# Patient Record
Sex: MALE | Race: WHITE | Employment: OTHER | URBAN - METROPOLITAN AREA
[De-identification: names, ages, dates, MRNs, and addresses within clinical notes are randomized per-mention and may not be internally consistent; named-entity substitution may affect disease eponyms.]

---

## 2017-01-04 ENCOUNTER — TELEPHONE (OUTPATIENT)
Dept: DERMATOLOGY | Age: 67
End: 2017-01-04

## 2017-02-01 ENCOUNTER — OFFICE VISIT (OUTPATIENT)
Dept: DERMATOLOGY | Age: 67
End: 2017-02-01

## 2017-02-01 DIAGNOSIS — L66.1 LICHEN PLANOPILARIS: Primary | ICD-10-CM

## 2017-02-01 PROCEDURE — 99212 OFFICE O/P EST SF 10 MIN: CPT | Performed by: DERMATOLOGY

## 2017-02-01 RX ORDER — FLUOCINONIDE TOPICAL SOLUTION USP, 0.05% 0.5 MG/ML
SOLUTION TOPICAL
Qty: 60 ML | Refills: 1 | Status: SHIPPED | OUTPATIENT
Start: 2017-02-01 | End: 2017-08-07 | Stop reason: SDUPTHER

## 2017-03-08 ENCOUNTER — EMPLOYEE WELLNESS (OUTPATIENT)
Dept: OTHER | Age: 67
End: 2017-03-08

## 2017-07-03 RX ORDER — TADALAFIL 5 MG
TABLET ORAL
Qty: 8 TABLET | Refills: 2 | Status: SHIPPED | OUTPATIENT
Start: 2017-07-03 | End: 2018-10-10 | Stop reason: SDUPTHER

## 2017-08-07 ENCOUNTER — OFFICE VISIT (OUTPATIENT)
Dept: DERMATOLOGY | Age: 67
End: 2017-08-07

## 2017-08-07 DIAGNOSIS — L66.1 LICHEN PLANOPILARIS: Primary | ICD-10-CM

## 2017-08-07 PROCEDURE — 99213 OFFICE O/P EST LOW 20 MIN: CPT | Performed by: DERMATOLOGY

## 2017-08-07 RX ORDER — FLUOCINONIDE TOPICAL SOLUTION USP, 0.05% 0.5 MG/ML
SOLUTION TOPICAL
Qty: 60 ML | Refills: 1 | Status: SHIPPED | OUTPATIENT
Start: 2017-08-07 | End: 2018-03-07

## 2018-02-07 ENCOUNTER — OFFICE VISIT (OUTPATIENT)
Dept: DERMATOLOGY | Age: 68
End: 2018-02-07

## 2018-02-07 DIAGNOSIS — L82.1 SK (SEBORRHEIC KERATOSIS): ICD-10-CM

## 2018-02-07 DIAGNOSIS — L66.1 LICHEN PLANOPILARIS: Primary | ICD-10-CM

## 2018-02-07 PROCEDURE — 99213 OFFICE O/P EST LOW 20 MIN: CPT | Performed by: DERMATOLOGY

## 2018-02-07 RX ORDER — BETAMETHASONE DIPROPIONATE 0.5 MG/ML
LOTION, AUGMENTED TOPICAL
Qty: 60 ML | Refills: 3 | Status: SHIPPED | OUTPATIENT
Start: 2018-02-07 | End: 2018-11-28 | Stop reason: ALTCHOICE

## 2018-03-07 ENCOUNTER — OFFICE VISIT (OUTPATIENT)
Dept: FAMILY MEDICINE CLINIC | Age: 68
End: 2018-03-07

## 2018-03-07 VITALS
HEIGHT: 68 IN | BODY MASS INDEX: 26.98 KG/M2 | SYSTOLIC BLOOD PRESSURE: 106 MMHG | WEIGHT: 178 LBS | RESPIRATION RATE: 14 BRPM | HEART RATE: 50 BPM | DIASTOLIC BLOOD PRESSURE: 70 MMHG | OXYGEN SATURATION: 96 %

## 2018-03-07 DIAGNOSIS — Z11.59 NEED FOR HEPATITIS C SCREENING TEST: ICD-10-CM

## 2018-03-07 DIAGNOSIS — I25.110 CORONARY ARTERY DISEASE INVOLVING NATIVE CORONARY ARTERY OF NATIVE HEART WITH UNSTABLE ANGINA PECTORIS (HCC): Primary | ICD-10-CM

## 2018-03-07 DIAGNOSIS — Z12.5 PROSTATE CANCER SCREENING: ICD-10-CM

## 2018-03-07 PROCEDURE — 99214 OFFICE O/P EST MOD 30 MIN: CPT | Performed by: FAMILY MEDICINE

## 2018-03-07 ASSESSMENT — PATIENT HEALTH QUESTIONNAIRE - PHQ9
1. LITTLE INTEREST OR PLEASURE IN DOING THINGS: 0
SUM OF ALL RESPONSES TO PHQ QUESTIONS 1-9: 0
SUM OF ALL RESPONSES TO PHQ9 QUESTIONS 1 & 2: 0
2. FEELING DOWN, DEPRESSED OR HOPELESS: 0

## 2018-03-07 ASSESSMENT — ENCOUNTER SYMPTOMS: SHORTNESS OF BREATH: 0

## 2018-03-07 NOTE — PROGRESS NOTES
No current facility-administered medications for this visit. Facility-Administered Medications Ordered in Other Visits   Medication Dose Route Frequency Provider Last Rate Last Dose    lidocaine 1 % (PF) injection 0.1-0.5 mL  0.1-0.5 mL Intradermal Once Fady Schaeffer MD           There is no immunization history for the selected administration types on file for this patient. No Known Allergies    Orders Only on 03/08/2017   Component Date Value Ref Range Status    Glucose 03/08/2017 97  70 - 99 mg/dL Final    Cholesterol, Total 03/08/2017 155  0 - 199 mg/dL Final    Triglycerides 03/08/2017 94  0 - 150 mg/dL Final    HDL 03/08/2017 42  40 - 60 mg/dL Final    LDL Calculated 03/08/2017 94  <100 mg/dL Final       Review of Systems   Constitutional: Negative for fatigue. Respiratory: Negative for shortness of breath. Cardiovascular: Negative for chest pain and palpitations. /70 (Site: Left Arm, Position: Sitting, Cuff Size: Medium Adult)   Pulse 50   Resp 14   Ht 5' 8\" (1.727 m)   Wt 178 lb (80.7 kg)   SpO2 96%   BMI 27.06 kg/m²     Physical Exam   Constitutional: He is oriented to person, place, and time. He appears well-developed and well-nourished. HENT:   Head: Normocephalic and atraumatic. Eyes: EOM are normal. Pupils are equal, round, and reactive to light. Neck: Normal range of motion. Cardiovascular: Normal rate, regular rhythm and normal heart sounds. Pulmonary/Chest: Effort normal and breath sounds normal.   Abdominal: Bowel sounds are normal. There is no tenderness. Neurological: He is alert and oriented to person, place, and time. Psychiatric: He has a normal mood and affect. His behavior is normal. Judgment and thought content normal.       Plan  1.  Coronary artery disease involving native coronary artery of native heart with unstable angina pectoris (HCC)  TSH without Reflex    T4, Free    T3, Free    Lipid Panel    CBC Auto Differential Comprehensive Metabolic Panel    Vitamin D 25 Hydroxy    Magnesium   2. Prostate cancer screening  Psa screening   3. Need for hepatitis C screening test  Hepatitis Panel, Acute       Return in about 1 month (around 4/7/2018) for Physical Exam.    Prior to Visit Medications    Medication Sig Taking? Authorizing Provider   betamethasone, augmented, (DIPROLENE) 0.05 % lotion Apply to the the sides and back of the scalp daily for 2 weeks and then 2 times weekly thereafter. Yes Lizzette Becerra MD   CIALIS 5 MG tablet TAKE 1 TABLET BY MOUTH AS NEEDED FOR ERECTILE DYSFUNCTION Yes Shakila Iqbal MD   carvedilol (COREG) 3.125 MG tablet Take 1 tablet by mouth 2 times daily Yes Venkata Stroud MD   aspirin 325 MG tablet Take 325 mg by mouth daily as needed.  Yes Historical Provider, MD   atorvastatin (LIPITOR) 40 MG tablet Take 1 tablet by mouth nightly  Alexys Casas MD

## 2018-03-08 ENCOUNTER — EMPLOYEE WELLNESS (OUTPATIENT)
Dept: OTHER | Age: 68
End: 2018-03-08

## 2018-03-08 LAB
CHOLESTEROL, TOTAL: 188 MG/DL (ref 0–199)
GLUCOSE BLD-MCNC: 89 MG/DL (ref 70–99)
HDLC SERPL-MCNC: 48 MG/DL (ref 40–60)
LDL CHOLESTEROL CALCULATED: 124 MG/DL
TRIGL SERPL-MCNC: 78 MG/DL (ref 0–150)

## 2018-03-19 ENCOUNTER — TELEPHONE (OUTPATIENT)
Dept: FAMILY MEDICINE CLINIC | Age: 68
End: 2018-03-19

## 2018-03-20 VITALS — BODY MASS INDEX: 27.52 KG/M2 | WEIGHT: 181 LBS

## 2018-04-02 VITALS — WEIGHT: 179 LBS | BODY MASS INDEX: 27.22 KG/M2

## 2018-04-09 DIAGNOSIS — I25.110 CORONARY ARTERY DISEASE INVOLVING NATIVE CORONARY ARTERY OF NATIVE HEART WITH UNSTABLE ANGINA PECTORIS (HCC): ICD-10-CM

## 2018-04-09 DIAGNOSIS — Z11.59 NEED FOR HEPATITIS C SCREENING TEST: ICD-10-CM

## 2018-04-09 DIAGNOSIS — Z12.5 PROSTATE CANCER SCREENING: ICD-10-CM

## 2018-04-09 LAB
A/G RATIO: 1.7 (ref 1.1–2.2)
ALBUMIN SERPL-MCNC: 4.2 G/DL (ref 3.4–5)
ALP BLD-CCNC: 62 U/L (ref 40–129)
ALT SERPL-CCNC: 17 U/L (ref 10–40)
ANION GAP SERPL CALCULATED.3IONS-SCNC: 14 MMOL/L (ref 3–16)
AST SERPL-CCNC: 18 U/L (ref 15–37)
BASOPHILS ABSOLUTE: 0.1 K/UL (ref 0–0.2)
BASOPHILS RELATIVE PERCENT: 1.2 %
BILIRUB SERPL-MCNC: 0.6 MG/DL (ref 0–1)
BUN BLDV-MCNC: 15 MG/DL (ref 7–20)
CALCIUM SERPL-MCNC: 9 MG/DL (ref 8.3–10.6)
CHLORIDE BLD-SCNC: 104 MMOL/L (ref 99–110)
CO2: 25 MMOL/L (ref 21–32)
CREAT SERPL-MCNC: 0.8 MG/DL (ref 0.8–1.3)
EOSINOPHILS ABSOLUTE: 0.1 K/UL (ref 0–0.6)
EOSINOPHILS RELATIVE PERCENT: 1.6 %
GFR AFRICAN AMERICAN: >60
GFR NON-AFRICAN AMERICAN: >60
GLOBULIN: 2.5 G/DL
GLUCOSE BLD-MCNC: 89 MG/DL (ref 70–99)
HAV IGM SER IA-ACNC: NORMAL
HCT VFR BLD CALC: 43.9 % (ref 40.5–52.5)
HEMOGLOBIN: 14.6 G/DL (ref 13.5–17.5)
HEPATITIS B CORE IGM ANTIBODY: NORMAL
HEPATITIS B SURFACE ANTIGEN INTERPRETATION: NORMAL
HEPATITIS C ANTIBODY INTERPRETATION: NORMAL
LYMPHOCYTES ABSOLUTE: 1.3 K/UL (ref 1–5.1)
LYMPHOCYTES RELATIVE PERCENT: 16.9 %
MAGNESIUM: 2 MG/DL (ref 1.8–2.4)
MCH RBC QN AUTO: 29.2 PG (ref 26–34)
MCHC RBC AUTO-ENTMCNC: 33.3 G/DL (ref 31–36)
MCV RBC AUTO: 87.7 FL (ref 80–100)
MONOCYTES ABSOLUTE: 0.7 K/UL (ref 0–1.3)
MONOCYTES RELATIVE PERCENT: 8.9 %
NEUTROPHILS ABSOLUTE: 5.6 K/UL (ref 1.7–7.7)
NEUTROPHILS RELATIVE PERCENT: 71.4 %
PDW BLD-RTO: 13.8 % (ref 12.4–15.4)
PLATELET # BLD: 211 K/UL (ref 135–450)
PMV BLD AUTO: 9.5 FL (ref 5–10.5)
POTASSIUM SERPL-SCNC: 4.4 MMOL/L (ref 3.5–5.1)
PROSTATE SPECIFIC ANTIGEN: 5.18 NG/ML (ref 0–4)
RBC # BLD: 5.01 M/UL (ref 4.2–5.9)
SODIUM BLD-SCNC: 143 MMOL/L (ref 136–145)
T3 FREE: 3.2 PG/ML (ref 2.3–4.2)
T4 FREE: 1.1 NG/DL (ref 0.9–1.8)
TOTAL PROTEIN: 6.7 G/DL (ref 6.4–8.2)
TSH SERPL DL<=0.05 MIU/L-ACNC: 1.58 UIU/ML (ref 0.27–4.2)
VITAMIN D 25-HYDROXY: 31.6 NG/ML
WBC # BLD: 7.8 K/UL (ref 4–11)

## 2018-04-11 ENCOUNTER — OFFICE VISIT (OUTPATIENT)
Dept: FAMILY MEDICINE CLINIC | Age: 68
End: 2018-04-11

## 2018-04-11 VITALS
HEART RATE: 65 BPM | DIASTOLIC BLOOD PRESSURE: 70 MMHG | HEIGHT: 68 IN | BODY MASS INDEX: 26.67 KG/M2 | SYSTOLIC BLOOD PRESSURE: 126 MMHG | OXYGEN SATURATION: 94 % | WEIGHT: 176 LBS | RESPIRATION RATE: 14 BRPM

## 2018-04-11 DIAGNOSIS — Z00.00 ANNUAL PHYSICAL EXAM: Primary | ICD-10-CM

## 2018-04-11 DIAGNOSIS — R97.20 ELEVATED PSA: ICD-10-CM

## 2018-04-11 PROCEDURE — 99397 PER PM REEVAL EST PAT 65+ YR: CPT | Performed by: FAMILY MEDICINE

## 2018-04-11 ASSESSMENT — ENCOUNTER SYMPTOMS
RHINORRHEA: 1
EYE PAIN: 0
COUGH: 0
SHORTNESS OF BREATH: 0
ABDOMINAL PAIN: 0
DIARRHEA: 0
BACK PAIN: 0
VOMITING: 0
EYE DISCHARGE: 0
APNEA: 0
SORE THROAT: 0
EYE ITCHING: 0
CONSTIPATION: 0
COLOR CHANGE: 0

## 2018-05-16 ENCOUNTER — OFFICE VISIT (OUTPATIENT)
Dept: DERMATOLOGY | Age: 68
End: 2018-05-16

## 2018-05-16 DIAGNOSIS — L57.0 AK (ACTINIC KERATOSIS): ICD-10-CM

## 2018-05-16 DIAGNOSIS — L66.1 LICHEN PLANOPILARIS: Primary | ICD-10-CM

## 2018-05-16 DIAGNOSIS — L82.1 SK (SEBORRHEIC KERATOSIS): ICD-10-CM

## 2018-05-16 PROCEDURE — 99213 OFFICE O/P EST LOW 20 MIN: CPT | Performed by: DERMATOLOGY

## 2018-05-16 PROCEDURE — 17003 DESTRUCT PREMALG LES 2-14: CPT | Performed by: DERMATOLOGY

## 2018-05-16 PROCEDURE — 17000 DESTRUCT PREMALG LESION: CPT | Performed by: DERMATOLOGY

## 2018-07-02 ENCOUNTER — OFFICE VISIT (OUTPATIENT)
Dept: FAMILY MEDICINE CLINIC | Age: 68
End: 2018-07-02

## 2018-07-02 VITALS
HEART RATE: 58 BPM | SYSTOLIC BLOOD PRESSURE: 114 MMHG | BODY MASS INDEX: 27.67 KG/M2 | DIASTOLIC BLOOD PRESSURE: 69 MMHG | TEMPERATURE: 98.6 F | WEIGHT: 182 LBS | RESPIRATION RATE: 16 BRPM

## 2018-07-02 DIAGNOSIS — H61.22 LEFT EAR IMPACTED CERUMEN: Primary | ICD-10-CM

## 2018-07-02 PROCEDURE — 69209 REMOVE IMPACTED EAR WAX UNI: CPT | Performed by: NURSE PRACTITIONER

## 2018-07-02 PROCEDURE — 99214 OFFICE O/P EST MOD 30 MIN: CPT | Performed by: NURSE PRACTITIONER

## 2018-07-02 ASSESSMENT — ENCOUNTER SYMPTOMS
STRIDOR: 0
BLURRED VISION: 0
SINUS PAIN: 0
RHINORRHEA: 1
VOMITING: 0
RESPIRATORY NEGATIVE: 1
DOUBLE VISION: 0
SORE THROAT: 0

## 2018-07-02 NOTE — PROGRESS NOTES
Subjective:      Chief Complaint   Patient presents with   Yogesh Bills - trouble hearing x 1 yr - on & off - tried to clean it out       Patient ID: Cheng May is a 74 y.o. male who presents for Earwax removal. He is a patient Dr. Deb Pinon. And has been having trouble hearing out of his left ear on and off for over a year. Recently it has become worse. States his wife is a nurse and she and patient have been trying to clean it with various solutions without benefit. Otalgia    There is pain in the left ear. This is a chronic problem. The current episode started more than 1 month ago. The problem occurs constantly. The problem has been gradually worsening. There has been no fever. The pain is at a severity of 5/10. The pain is moderate. Associated symptoms include headaches, hearing loss and rhinorrhea. Pertinent negatives include no ear discharge, neck pain, sore throat or vomiting. Treatments tried: over-the-counter ear wax removal. The treatment provided mild relief. Family History   Problem Relation Age of Onset    Heart Disease Father     Stroke Mother        Social History     Social History    Marital status:      Spouse name: N/A    Number of children: N/A    Years of education: N/A     Occupational History    Not on file. Social History Main Topics    Smoking status: Never Smoker    Smokeless tobacco: Never Used    Alcohol use 0.6 oz/week     1 Cans of beer per week      Comment: occassionally    Drug use: No    Sexual activity: Not Currently     Other Topics Concern    Not on file     Social History Narrative    No narrative on file       Current Outpatient Prescriptions on File Prior to Visit   Medication Sig Dispense Refill    betamethasone, augmented, (DIPROLENE) 0.05 % lotion Apply to the the sides and back of the scalp daily for 2 weeks and then 2 times weekly thereafter.  60 mL 3    CIALIS 5 MG tablet TAKE 1 TABLET BY MOUTH AS NEEDED FOR ERECTILE DYSFUNCTION 8

## 2018-10-10 NOTE — TELEPHONE ENCOUNTER
VSP - you have not seen patient since 12/30/16 (see note below). He was supposed to follow up in 1 year. Do you want to refill this medication? I have pended it if you decide to do so. Assessment:  61 y.o. patient with:  1. Coronary artery disease              ~S/P CABG x 5 Dec 2015              ~normal LVEF after bypass              ~Discusssed stopping Coreg 3 years post CABG   2. Hypotension              BP: (120)/(60)               ~unable to tolerate ACE inhinbitor  3.  Erectile dysfunction

## 2018-10-11 RX ORDER — TADALAFIL 5 MG/1
TABLET ORAL
Qty: 8 TABLET | Refills: 0 | Status: SHIPPED | OUTPATIENT
Start: 2018-10-11 | End: 2018-10-12 | Stop reason: SDUPTHER

## 2018-10-12 ENCOUNTER — TELEPHONE (OUTPATIENT)
Dept: CARDIOLOGY CLINIC | Age: 68
End: 2018-10-12

## 2018-10-12 DIAGNOSIS — N52.9 ERECTILE DYSFUNCTION, UNSPECIFIED ERECTILE DYSFUNCTION TYPE: Primary | ICD-10-CM

## 2018-10-12 RX ORDER — TADALAFIL 5 MG/1
TABLET ORAL
Qty: 8 TABLET | Refills: 0 | Status: ON HOLD | OUTPATIENT
Start: 2018-10-12 | End: 2019-03-30 | Stop reason: ALTCHOICE

## 2018-10-12 RX ORDER — TADALAFIL 5 MG/1
TABLET ORAL
Qty: 8 TABLET | Refills: 0 | Status: SHIPPED | OUTPATIENT
Start: 2018-10-12 | End: 2018-10-12 | Stop reason: SDUPTHER

## 2018-10-12 NOTE — TELEPHONE ENCOUNTER
Pt would like the Cialis Rx to go to [facility] outpatient pharmacy instead of [place]. Please resend.

## 2018-10-22 ENCOUNTER — HOSPITAL ENCOUNTER (OUTPATIENT)
Age: 68
Discharge: HOME | End: 2018-10-22
Payer: COMMERCIAL

## 2018-10-22 PROCEDURE — 84153 ASSAY OF PSA TOTAL: CPT

## 2018-10-22 PROCEDURE — 36415 COLL VENOUS BLD VENIPUNCTURE: CPT

## 2018-10-23 LAB — PROSTATE SPECIFIC ANTIGEN: 5.95 NG/ML (ref 0–4)

## 2018-11-04 ENCOUNTER — TELEPHONE (OUTPATIENT)
Dept: CARDIOLOGY CLINIC | Age: 68
End: 2018-11-04

## 2018-11-26 ENCOUNTER — OFFICE VISIT (OUTPATIENT)
Dept: CARDIOLOGY CLINIC | Age: 68
End: 2018-11-26
Payer: COMMERCIAL

## 2018-11-26 VITALS
OXYGEN SATURATION: 99 % | WEIGHT: 190.8 LBS | SYSTOLIC BLOOD PRESSURE: 120 MMHG | BODY MASS INDEX: 28.92 KG/M2 | HEART RATE: 45 BPM | HEIGHT: 68 IN | DIASTOLIC BLOOD PRESSURE: 82 MMHG

## 2018-11-26 DIAGNOSIS — I25.10 CORONARY ARTERY DISEASE INVOLVING NATIVE CORONARY ARTERY OF NATIVE HEART WITHOUT ANGINA PECTORIS: Primary | ICD-10-CM

## 2018-11-26 DIAGNOSIS — E78.2 MIXED HYPERLIPIDEMIA: ICD-10-CM

## 2018-11-26 DIAGNOSIS — I25.110 CORONARY ARTERY DISEASE INVOLVING NATIVE CORONARY ARTERY OF NATIVE HEART WITH UNSTABLE ANGINA PECTORIS (HCC): ICD-10-CM

## 2018-11-26 PROCEDURE — 99214 OFFICE O/P EST MOD 30 MIN: CPT | Performed by: INTERNAL MEDICINE

## 2018-11-26 RX ORDER — ATORVASTATIN CALCIUM 10 MG/1
10 TABLET, FILM COATED ORAL NIGHTLY
Qty: 90 TABLET | Refills: 3 | Status: SHIPPED | OUTPATIENT
Start: 2018-11-26 | End: 2019-02-12 | Stop reason: SDUPTHER

## 2018-11-26 NOTE — LETTER
[address] [place] [address] Cardiology - [address] [place] [address] Rebecca Villegas [place] [address]  Phone: [phone]  Fax: [phone]    Drew Cedillo MD        December 2, 2018     Maynor Solis DO  [address] [address]    Patient: Sharifa Oviedo  MR Number: [id number]  YOB: 1950  Date of Visit: 11/26/2018    Dear Dr. Maynor Solis: Thank you for allowing me to participate in the care of Sharifa Oviedo. Below are the relevant portions of my assessment and plan of care. History of present illness on initial date of evaluation:              Sharifa Oviedo is a 79 y.o. patient who presents who presents for follow-up. He reports he has been feeling well since his last visit. He decreased his Coreg to once daily roughly 1 year ago. He stopped his Lipitor due to the possible side effects. He has not experienced any of these side effects, though is fearful of them. He has been taking CoQ10. He reports that he recently found that his PSA is elevated. He has been following up appropriately for this. Claudia Sanchez remains active, exercising regularly. He has been attempting to lose weight by monitoring his diet. Assessment:  79 y.o. patient with:  1. Coronary artery disease              ~S/P CABG x 5 Dec 2015              ~normal LVEF after bypass              ~Discusssed stopping Coreg 3 years post CABG   2. Hypotension              BP: (120)/(82)               ~unable to tolerate ACE inhibitor  3. Hyperlipidemia              ~Stopped Lipitor due to fear of side effects. 4. Erectile dysfunction     The 10-year ASCVD risk score (Jim Calixto, et al., 2013) is: 12.1%    Values used to calculate the score:      Age: 79 years      Sex: Male      Is Non- : No      Diabetic: No      Tobacco smoker: No      Systolic Blood Pressure: 302 mmHg      Is BP treated: No      HDL Cholesterol: 48 mg/dL      Total Cholesterol: 188 mg/dL      Plan:  1. Decrease Aspirin 81 mg daily.

## 2018-11-28 ENCOUNTER — OFFICE VISIT (OUTPATIENT)
Dept: FAMILY MEDICINE CLINIC | Age: 68
End: 2018-11-28
Payer: COMMERCIAL

## 2018-11-28 VITALS
OXYGEN SATURATION: 99 % | RESPIRATION RATE: 16 BRPM | SYSTOLIC BLOOD PRESSURE: 118 MMHG | DIASTOLIC BLOOD PRESSURE: 70 MMHG | WEIGHT: 187 LBS | BODY MASS INDEX: 28.43 KG/M2 | TEMPERATURE: 98 F | HEART RATE: 80 BPM

## 2018-11-28 DIAGNOSIS — M54.31 SCIATICA, RIGHT SIDE: ICD-10-CM

## 2018-11-28 DIAGNOSIS — Z01.818 PRE-OP EXAM: Primary | ICD-10-CM

## 2018-11-28 PROCEDURE — 93000 ELECTROCARDIOGRAM COMPLETE: CPT | Performed by: FAMILY MEDICINE

## 2018-11-28 PROCEDURE — 99244 OFF/OP CNSLTJ NEW/EST MOD 40: CPT | Performed by: FAMILY MEDICINE

## 2018-11-28 RX ORDER — TIZANIDINE 4 MG/1
4 TABLET ORAL EVERY 8 HOURS PRN
Qty: 30 TABLET | Refills: 0 | Status: SHIPPED | OUTPATIENT
Start: 2018-11-28 | End: 2018-12-08

## 2018-11-28 ASSESSMENT — PATIENT HEALTH QUESTIONNAIRE - PHQ9
2. FEELING DOWN, DEPRESSED OR HOPELESS: 0
SUM OF ALL RESPONSES TO PHQ QUESTIONS 1-9: 0
SUM OF ALL RESPONSES TO PHQ QUESTIONS 1-9: 0
1. LITTLE INTEREST OR PLEASURE IN DOING THINGS: 0
SUM OF ALL RESPONSES TO PHQ9 QUESTIONS 1 & 2: 0

## 2018-11-28 NOTE — PROGRESS NOTES
Preoperative Consultation      Michelle Rosales  YOB: 1950    Date of Service:  11/28/2018    Vitals:    11/28/18 0936   BP: 118/70   Site: Right Upper Arm   Position: Sitting   Pulse: 80   Resp: 16   Temp: 98 °F (36.7 °C)   TempSrc: Oral   SpO2: 99%   Weight: 187 lb (84.8 kg)      Wt Readings from Last 2 Encounters:   11/28/18 187 lb (84.8 kg)   11/26/18 190 lb 12.8 oz (86.5 kg)     BP Readings from Last 3 Encounters:   11/28/18 118/70   11/26/18 120/82   07/02/18 114/69        Chief Complaint   Patient presents with    Pre-op Exam     biopsy of prostate, 12/5/18, Dr. Joseline Valle[facility]     No Known Allergies  Outpatient Prescriptions Marked as Taking for the 11/28/18 encounter (Office Visit) with Sky Ulloa, DO   Medication Sig Dispense Refill    aspirin 81 MG tablet Take 1 tablet by mouth daily 90 tablet 3    atorvastatin (LIPITOR) 10 MG tablet Take 1 tablet by mouth nightly 90 tablet 3    tadalafil (CIALIS) 5 MG tablet TAKE 1 TABLET BY MOUTH AS NEEDED FOR ERECTILE DYSFUNCTION 8 tablet 0       This patient presents to the office today for a preoperative consultation at the request of surgeon, Dr. Joseline Valle, who plans on performing [facility]. The current problem began 7 years ago, and symptoms have been worsening with time. Conservative therapy: Flomax but didn't like SE's. Pt has also been having sciatica in right left since yesterday, has periodic episodes which resolve in a few days for the last 40-50 years.     Planned anesthesia: General   Known anesthesia problems: None   Bleeding risk: No recent or remote history of abnormal bleeding  Personal or FH of DVT/PE: No    Patient objection to receiving blood products: No    Patient Active Problem List   Diagnosis    Chest pain on exertion    Exertional chest pain    ACS (acute coronary syndrome) (HCC)    Abnormal stress test    Coronary artery disease involving native coronary artery of

## 2019-01-10 NOTE — PROGRESS NOTES
Name_______________________________________Printed:____________________  Date and time of surgery__1/28/19  0730______________________Arrival Time:_0600  main_______________   1. Do not eat or drink anything after 12 midnight (or____hours) prior to surgery. This includes no water, chewing gum or mints. Endoscopy patients follow your doctors bowel prep instructions,which may include taking part of prep after midnight. 2. Take the following pills with a small sip of water on the morning of surgery___none__________________________________________________   3. Aspirin, Ibuprofen, Advil, Naproxen, Vitamin E and other Anti-inflammatory products should be stopped for 5 days before surgery or as directed by your physician. 4. Check with your Doctor regarding stopping Plavix, Coumadin,Eliquis, Lovenox,Effient,Pradaxa,Xarelto, Fragmin or other blood thinners and follow their instructions. 5. Do not smoke, and do not drink any alcoholic beverages 24 hours prior to surgery. This includes NA Beer. Refrain from the usage of any recreational drugs. 6. You may brush your teeth and gargle the morning of surgery. DO NOT SWALLOW WATER   7. You MUST make arrangements for a responsible adult to stay on site while you are here and take you home after your surgery. You will not be allowed to leave alone or drive yourself home. It is strongly suggested someone stay with you the first 24 hrs. Your surgery will be cancelled if you do not have a ride home. 8. A parent/legal guardian must accompany a child scheduled for surgery and plan to stay at the hospital until the child is discharged. Please do not bring other children with you. 9. Please wear simple, loose fitting clothing to the hospital.  Rayshawn Carton not bring valuables (money, credit cards, checkbooks, etc.) Do not wear any makeup (including no eye makeup) or nail polish on your fingers or toes. 10. DO NOT wear any jewelry or piercings on day of surgery.   All body piercing jewelry must be removed. 11. If you have ___dentures, they will be removed before going to the OR; we will provide you a container. If you wear ___contact lenses or ___glasses, they will be removed; please bring a case for them. 12. Please see your family doctor/pediatrician for a history & physical and/or concerning medications. Bring any test results/reports from your physician's office. PCP__________________Phone___________H&P Appt. Date_to make appt_______             13 If you  have a Living Will and Durable Power of  for Healthcare, please bring in a copy. 15. Notify your Surgeon if you develop any illness between now and surgery  time, cough, cold, fever, sore throat, nausea, vomiting, etc.  Please notify your surgeon if you experience dizziness, shortness of breath or blurred vision between now & the time of your surgery             15. DO NOT shave your operative site 96 hours prior to surgery. For face & neck surgery, men may use an electric razor 48 hours prior to surgery. 16. Shower the night before surgery with _x__Antibacterial soap ___Hibiclens             17. To provide excellent care visitors will be limited to one in the room at any given time. 18.  Please bring picture ID and insurance card. 19.  Visit our web site for additional information:  [email or web address]              20.During flu season no children under the age of 15 are permitted in the hospital for the safety of all patients. 21. If you take a long acting insulin in the evening only  take half of your usual  dose the night  before your procedure              22. If you use a c-pap please bring DOS if staying overnight,             23.For your convenience [facility] has a pharmacy on site to fill your prescriptions.              24. If you use oxygen and have a portable tank please bring it  with you the DOS

## 2019-01-15 ENCOUNTER — HOSPITAL ENCOUNTER (OUTPATIENT)
Dept: GENERAL RADIOLOGY | Age: 69
Discharge: HOME | End: 2019-01-15
Payer: COMMERCIAL

## 2019-01-15 ENCOUNTER — HOSPITAL ENCOUNTER (OUTPATIENT)
Age: 69
Discharge: HOME | End: 2019-01-15
Payer: COMMERCIAL

## 2019-01-15 DIAGNOSIS — Z01.818 PREOP TESTING: ICD-10-CM

## 2019-01-15 LAB
A/G RATIO: 1.4 (ref 1.1–2.2)
ABO/RH: NORMAL
ALBUMIN SERPL-MCNC: 4.4 G/DL (ref 3.4–5)
ALP BLD-CCNC: 66 U/L (ref 40–129)
ALT SERPL-CCNC: 19 U/L (ref 10–40)
ANION GAP SERPL CALCULATED.3IONS-SCNC: 14 MMOL/L (ref 3–16)
ANTIBODY SCREEN: NORMAL
APTT: 30 SEC (ref 26–36)
AST SERPL-CCNC: 25 U/L (ref 15–37)
BASOPHILS ABSOLUTE: 0.1 K/UL (ref 0–0.2)
BASOPHILS RELATIVE PERCENT: 1.3 %
BILIRUB SERPL-MCNC: 0.8 MG/DL (ref 0–1)
BUN BLDV-MCNC: 13 MG/DL (ref 7–20)
CALCIUM SERPL-MCNC: 10.1 MG/DL (ref 8.3–10.6)
CHLORIDE BLD-SCNC: 102 MMOL/L (ref 99–110)
CO2: 25 MMOL/L (ref 21–32)
CREAT SERPL-MCNC: 1 MG/DL (ref 0.8–1.3)
EOSINOPHILS ABSOLUTE: 0.1 K/UL (ref 0–0.6)
EOSINOPHILS RELATIVE PERCENT: 2 %
GFR AFRICAN AMERICAN: >60
GFR NON-AFRICAN AMERICAN: >60
GLOBULIN: 3.1 G/DL
GLUCOSE BLD-MCNC: 92 MG/DL (ref 70–99)
HCT VFR BLD CALC: 43.6 % (ref 40.5–52.5)
HEMOGLOBIN: 14.6 G/DL (ref 13.5–17.5)
INR BLD: 1.11 (ref 0.86–1.14)
LYMPHOCYTES ABSOLUTE: 1.5 K/UL (ref 1–5.1)
LYMPHOCYTES RELATIVE PERCENT: 23.1 %
MCH RBC QN AUTO: 29.1 PG (ref 26–34)
MCHC RBC AUTO-ENTMCNC: 33.6 G/DL (ref 31–36)
MCV RBC AUTO: 86.6 FL (ref 80–100)
MONOCYTES ABSOLUTE: 0.6 K/UL (ref 0–1.3)
MONOCYTES RELATIVE PERCENT: 8.9 %
NEUTROPHILS ABSOLUTE: 4.2 K/UL (ref 1.7–7.7)
NEUTROPHILS RELATIVE PERCENT: 64.7 %
PDW BLD-RTO: 13.3 % (ref 12.4–15.4)
PLATELET # BLD: 232 K/UL (ref 135–450)
PMV BLD AUTO: 9 FL (ref 5–10.5)
POTASSIUM SERPL-SCNC: 5.1 MMOL/L (ref 3.5–5.1)
PROTHROMBIN TIME: 12.6 SEC (ref 9.8–13)
RBC # BLD: 5.04 M/UL (ref 4.2–5.9)
SODIUM BLD-SCNC: 141 MMOL/L (ref 136–145)
TOTAL PROTEIN: 7.5 G/DL (ref 6.4–8.2)
WBC # BLD: 6.4 K/UL (ref 4–11)

## 2019-01-15 PROCEDURE — 85730 THROMBOPLASTIN TIME PARTIAL: CPT

## 2019-01-15 PROCEDURE — 86900 BLOOD TYPING SEROLOGIC ABO: CPT

## 2019-01-15 PROCEDURE — 85610 PROTHROMBIN TIME: CPT

## 2019-01-15 PROCEDURE — 71046 X-RAY EXAM CHEST 2 VIEWS: CPT

## 2019-01-15 PROCEDURE — 36415 COLL VENOUS BLD VENIPUNCTURE: CPT

## 2019-01-15 PROCEDURE — 80053 COMPREHEN METABOLIC PANEL: CPT

## 2019-01-15 PROCEDURE — 86850 RBC ANTIBODY SCREEN: CPT

## 2019-01-15 PROCEDURE — 85025 COMPLETE CBC W/AUTO DIFF WBC: CPT

## 2019-01-15 PROCEDURE — 86901 BLOOD TYPING SEROLOGIC RH(D): CPT

## 2019-01-21 ENCOUNTER — OFFICE VISIT (OUTPATIENT)
Dept: FAMILY MEDICINE CLINIC | Age: 69
End: 2019-01-21
Payer: COMMERCIAL

## 2019-01-21 VITALS
HEART RATE: 61 BPM | SYSTOLIC BLOOD PRESSURE: 130 MMHG | RESPIRATION RATE: 16 BRPM | BODY MASS INDEX: 28.46 KG/M2 | TEMPERATURE: 99.1 F | DIASTOLIC BLOOD PRESSURE: 80 MMHG | OXYGEN SATURATION: 98 % | WEIGHT: 187.2 LBS

## 2019-01-21 DIAGNOSIS — C61 PROSTATE CANCER (HCC): Primary | ICD-10-CM

## 2019-01-21 DIAGNOSIS — Z01.818 PREOP EXAMINATION: ICD-10-CM

## 2019-01-21 PROCEDURE — 99243 OFF/OP CNSLTJ NEW/EST LOW 30: CPT | Performed by: NURSE PRACTITIONER

## 2019-01-21 ASSESSMENT — PATIENT HEALTH QUESTIONNAIRE - PHQ9
SUM OF ALL RESPONSES TO PHQ9 QUESTIONS 1 & 2: 0
1. LITTLE INTEREST OR PLEASURE IN DOING THINGS: 0
SUM OF ALL RESPONSES TO PHQ QUESTIONS 1-9: 0
SUM OF ALL RESPONSES TO PHQ QUESTIONS 1-9: 0
2. FEELING DOWN, DEPRESSED OR HOPELESS: 0

## 2019-01-21 ASSESSMENT — ENCOUNTER SYMPTOMS
CONSTIPATION: 0
EYE ITCHING: 0
EYE REDNESS: 0
CHEST TIGHTNESS: 0
WHEEZING: 0
ABDOMINAL PAIN: 0
TROUBLE SWALLOWING: 0
SINUS PRESSURE: 0
DIARRHEA: 0
SHORTNESS OF BREATH: 0
SORE THROAT: 0
COUGH: 1
NAUSEA: 0
COLOR CHANGE: 0

## 2019-01-27 ENCOUNTER — ANESTHESIA EVENT (OUTPATIENT)
Dept: OPERATING ROOM | Age: 69
DRG: 707 | End: 2019-01-27
Payer: COMMERCIAL

## 2019-01-28 ENCOUNTER — ANESTHESIA (OUTPATIENT)
Dept: OPERATING ROOM | Age: 69
DRG: 707 | End: 2019-01-28
Payer: COMMERCIAL

## 2019-01-28 ENCOUNTER — HOSPITAL ENCOUNTER (INPATIENT)
Age: 69
LOS: 1 days | Discharge: HOME | DRG: 707 | End: 2019-01-29
Attending: UROLOGY | Admitting: UROLOGY
Payer: COMMERCIAL

## 2019-01-28 VITALS
SYSTOLIC BLOOD PRESSURE: 156 MMHG | OXYGEN SATURATION: 96 % | TEMPERATURE: 97.2 F | DIASTOLIC BLOOD PRESSURE: 79 MMHG | RESPIRATION RATE: 2 BRPM

## 2019-01-28 DIAGNOSIS — C61 PROSTATE CANCER (HCC): ICD-10-CM

## 2019-01-28 DIAGNOSIS — Z01.818 PREOP TESTING: Primary | ICD-10-CM

## 2019-01-28 LAB
ABO/RH: NORMAL
ANTIBODY SCREEN: NORMAL

## 2019-01-28 PROCEDURE — G0378 HOSPITAL OBSERVATION PER HR: HCPCS

## 2019-01-28 PROCEDURE — 1200000000 HC SEMI PRIVATE

## 2019-01-28 PROCEDURE — 6360000002 HC RX W HCPCS: Performed by: NURSE ANESTHETIST, CERTIFIED REGISTERED

## 2019-01-28 PROCEDURE — 2580000003 HC RX 258: Performed by: NURSE ANESTHETIST, CERTIFIED REGISTERED

## 2019-01-28 PROCEDURE — 6360000002 HC RX W HCPCS: Performed by: UROLOGY

## 2019-01-28 PROCEDURE — 2580000003 HC RX 258: Performed by: UROLOGY

## 2019-01-28 PROCEDURE — 3700000000 HC ANESTHESIA ATTENDED CARE: Performed by: UROLOGY

## 2019-01-28 PROCEDURE — 6370000000 HC RX 637 (ALT 250 FOR IP): Performed by: UROLOGY

## 2019-01-28 PROCEDURE — 94760 N-INVAS EAR/PLS OXIMETRY 1: CPT

## 2019-01-28 PROCEDURE — S2900 ROBOTIC SURGICAL SYSTEM: HCPCS | Performed by: UROLOGY

## 2019-01-28 PROCEDURE — 8E0W4CZ ROBOTIC ASSISTED PROCEDURE OF TRUNK REGION, PERCUTANEOUS ENDOSCOPIC APPROACH: ICD-10-PCS | Performed by: UROLOGY

## 2019-01-28 PROCEDURE — 0VT04ZZ RESECTION OF PROSTATE, PERCUTANEOUS ENDOSCOPIC APPROACH: ICD-10-PCS | Performed by: UROLOGY

## 2019-01-28 PROCEDURE — 3600000019 HC SURGERY ROBOT ADDTL 15MIN: Performed by: UROLOGY

## 2019-01-28 PROCEDURE — 2500000003 HC RX 250 WO HCPCS: Performed by: UROLOGY

## 2019-01-28 PROCEDURE — 6360000002 HC RX W HCPCS: Performed by: ANESTHESIOLOGY

## 2019-01-28 PROCEDURE — 7100000001 HC PACU RECOVERY - ADDTL 15 MIN: Performed by: UROLOGY

## 2019-01-28 PROCEDURE — 7100000000 HC PACU RECOVERY - FIRST 15 MIN: Performed by: UROLOGY

## 2019-01-28 PROCEDURE — 88309 TISSUE EXAM BY PATHOLOGIST: CPT

## 2019-01-28 PROCEDURE — 2500000003 HC RX 250 WO HCPCS: Performed by: NURSE ANESTHETIST, CERTIFIED REGISTERED

## 2019-01-28 PROCEDURE — 86901 BLOOD TYPING SEROLOGIC RH(D): CPT

## 2019-01-28 PROCEDURE — 86900 BLOOD TYPING SEROLOGIC ABO: CPT

## 2019-01-28 PROCEDURE — 3700000001 HC ADD 15 MINUTES (ANESTHESIA): Performed by: UROLOGY

## 2019-01-28 PROCEDURE — 3600000009 HC SURGERY ROBOT BASE: Performed by: UROLOGY

## 2019-01-28 PROCEDURE — 07BC4ZZ EXCISION OF PELVIS LYMPHATIC, PERCUTANEOUS ENDOSCOPIC APPROACH: ICD-10-PCS | Performed by: UROLOGY

## 2019-01-28 PROCEDURE — 2709999900 HC NON-CHARGEABLE SUPPLY: Performed by: UROLOGY

## 2019-01-28 PROCEDURE — 94664 DEMO&/EVAL PT USE INHALER: CPT

## 2019-01-28 PROCEDURE — 6370000000 HC RX 637 (ALT 250 FOR IP)

## 2019-01-28 PROCEDURE — 88305 TISSUE EXAM BY PATHOLOGIST: CPT

## 2019-01-28 PROCEDURE — 86850 RBC ANTIBODY SCREEN: CPT

## 2019-01-28 PROCEDURE — 2700000000 HC OXYGEN THERAPY PER DAY

## 2019-01-28 RX ORDER — MAGNESIUM HYDROXIDE 1200 MG/15ML
LIQUID ORAL CONTINUOUS PRN
Status: COMPLETED | OUTPATIENT
Start: 2019-01-28 | End: 2019-01-28

## 2019-01-28 RX ORDER — ATROPA BELLADONNA AND OPIUM 16.2; 3 MG/1; MG/1
30 SUPPOSITORY RECTAL EVERY 8 HOURS PRN
Status: DISCONTINUED | OUTPATIENT
Start: 2019-01-28 | End: 2019-01-29 | Stop reason: HOSPADM

## 2019-01-28 RX ORDER — NEOSTIGMINE METHYLSULFATE 5 MG/5 ML
SYRINGE (ML) INTRAVENOUS PRN
Status: DISCONTINUED | OUTPATIENT
Start: 2019-01-28 | End: 2019-01-28 | Stop reason: SDUPTHER

## 2019-01-28 RX ORDER — HYDROMORPHONE HCL 110MG/55ML
0.5 PATIENT CONTROLLED ANALGESIA SYRINGE INTRAVENOUS EVERY 5 MIN PRN
Status: DISCONTINUED | OUTPATIENT
Start: 2019-01-28 | End: 2019-01-28 | Stop reason: HOSPADM

## 2019-01-28 RX ORDER — BISACODYL 10 MG
10 SUPPOSITORY, RECTAL RECTAL DAILY PRN
Status: DISCONTINUED | OUTPATIENT
Start: 2019-01-28 | End: 2019-01-29 | Stop reason: HOSPADM

## 2019-01-28 RX ORDER — FENTANYL CITRATE 50 UG/ML
25 INJECTION, SOLUTION INTRAMUSCULAR; INTRAVENOUS EVERY 5 MIN PRN
Status: DISCONTINUED | OUTPATIENT
Start: 2019-01-28 | End: 2019-01-28 | Stop reason: HOSPADM

## 2019-01-28 RX ORDER — CEFAZOLIN SODIUM 2 G/100ML
2 INJECTION, SOLUTION INTRAVENOUS EVERY 8 HOURS
Status: COMPLETED | OUTPATIENT
Start: 2019-01-28 | End: 2019-01-28

## 2019-01-28 RX ORDER — SODIUM CHLORIDE 0.9 % (FLUSH) 0.9 %
10 SYRINGE (ML) INJECTION PRN
Status: DISCONTINUED | OUTPATIENT
Start: 2019-01-28 | End: 2019-01-29 | Stop reason: HOSPADM

## 2019-01-28 RX ORDER — ACETAMINOPHEN 325 MG/1
650 TABLET ORAL EVERY 4 HOURS PRN
Status: DISCONTINUED | OUTPATIENT
Start: 2019-01-28 | End: 2019-01-29 | Stop reason: HOSPADM

## 2019-01-28 RX ORDER — MORPHINE SULFATE 2 MG/ML
2 INJECTION, SOLUTION INTRAMUSCULAR; INTRAVENOUS
Status: DISCONTINUED | OUTPATIENT
Start: 2019-01-28 | End: 2019-01-29 | Stop reason: HOSPADM

## 2019-01-28 RX ORDER — SODIUM CHLORIDE, SODIUM LACTATE, POTASSIUM CHLORIDE, CALCIUM CHLORIDE 600; 310; 30; 20 MG/100ML; MG/100ML; MG/100ML; MG/100ML
INJECTION, SOLUTION INTRAVENOUS CONTINUOUS
Status: DISCONTINUED | OUTPATIENT
Start: 2019-01-28 | End: 2019-01-28

## 2019-01-28 RX ORDER — ONDANSETRON 2 MG/ML
4 INJECTION INTRAMUSCULAR; INTRAVENOUS
Status: DISCONTINUED | OUTPATIENT
Start: 2019-01-28 | End: 2019-01-28 | Stop reason: HOSPADM

## 2019-01-28 RX ORDER — LIDOCAINE HYDROCHLORIDE 10 MG/ML
1 INJECTION, SOLUTION EPIDURAL; INFILTRATION; INTRACAUDAL; PERINEURAL
Status: ACTIVE | OUTPATIENT
Start: 2019-01-28 | End: 2019-01-28

## 2019-01-28 RX ORDER — GINSENG 100 MG
CAPSULE ORAL 2 TIMES DAILY
Status: DISCONTINUED | OUTPATIENT
Start: 2019-01-28 | End: 2019-01-29 | Stop reason: HOSPADM

## 2019-01-28 RX ORDER — SENNA AND DOCUSATE SODIUM 50; 8.6 MG/1; MG/1
1 TABLET, FILM COATED ORAL 2 TIMES DAILY
Status: DISCONTINUED | OUTPATIENT
Start: 2019-01-28 | End: 2019-01-29 | Stop reason: HOSPADM

## 2019-01-28 RX ORDER — ONDANSETRON 2 MG/ML
INJECTION INTRAMUSCULAR; INTRAVENOUS PRN
Status: DISCONTINUED | OUTPATIENT
Start: 2019-01-28 | End: 2019-01-28 | Stop reason: SDUPTHER

## 2019-01-28 RX ORDER — DEXAMETHASONE SODIUM PHOSPHATE 4 MG/ML
INJECTION, SOLUTION INTRA-ARTICULAR; INTRALESIONAL; INTRAMUSCULAR; INTRAVENOUS; SOFT TISSUE PRN
Status: DISCONTINUED | OUTPATIENT
Start: 2019-01-28 | End: 2019-01-28 | Stop reason: SDUPTHER

## 2019-01-28 RX ORDER — AMOXICILLIN 250 MG
1 CAPSULE ORAL 2 TIMES DAILY
Qty: 50 TABLET | Refills: 0 | Status: SHIPPED | OUTPATIENT
Start: 2019-01-28 | End: 2019-06-13

## 2019-01-28 RX ORDER — SODIUM CHLORIDE 9 MG/ML
INJECTION, SOLUTION INTRAVENOUS CONTINUOUS
Status: DISCONTINUED | OUTPATIENT
Start: 2019-01-28 | End: 2019-01-29 | Stop reason: HOSPADM

## 2019-01-28 RX ORDER — LIDOCAINE HYDROCHLORIDE 20 MG/ML
INJECTION, SOLUTION INFILTRATION; PERINEURAL PRN
Status: DISCONTINUED | OUTPATIENT
Start: 2019-01-28 | End: 2019-01-28 | Stop reason: SDUPTHER

## 2019-01-28 RX ORDER — ONDANSETRON 2 MG/ML
4 INJECTION INTRAMUSCULAR; INTRAVENOUS EVERY 6 HOURS PRN
Status: DISCONTINUED | OUTPATIENT
Start: 2019-01-28 | End: 2019-01-29 | Stop reason: HOSPADM

## 2019-01-28 RX ORDER — ROCURONIUM BROMIDE 10 MG/ML
INJECTION, SOLUTION INTRAVENOUS PRN
Status: DISCONTINUED | OUTPATIENT
Start: 2019-01-28 | End: 2019-01-28 | Stop reason: SDUPTHER

## 2019-01-28 RX ORDER — LABETALOL HYDROCHLORIDE 5 MG/ML
5 INJECTION, SOLUTION INTRAVENOUS EVERY 10 MIN PRN
Status: DISCONTINUED | OUTPATIENT
Start: 2019-01-28 | End: 2019-01-28 | Stop reason: HOSPADM

## 2019-01-28 RX ORDER — HYDROCODONE BITARTRATE AND ACETAMINOPHEN 5; 325 MG/1; MG/1
1 TABLET ORAL EVERY 6 HOURS PRN
Qty: 15 TABLET | Refills: 0 | Status: SHIPPED | OUTPATIENT
Start: 2019-01-28 | End: 2019-02-02

## 2019-01-28 RX ORDER — SODIUM CHLORIDE 0.9 % (FLUSH) 0.9 %
10 SYRINGE (ML) INJECTION EVERY 12 HOURS SCHEDULED
Status: DISCONTINUED | OUTPATIENT
Start: 2019-01-28 | End: 2019-01-29 | Stop reason: HOSPADM

## 2019-01-28 RX ORDER — HYDROCODONE BITARTRATE AND ACETAMINOPHEN 5; 325 MG/1; MG/1
2 TABLET ORAL EVERY 4 HOURS PRN
Status: DISCONTINUED | OUTPATIENT
Start: 2019-01-28 | End: 2019-01-29 | Stop reason: HOSPADM

## 2019-01-28 RX ORDER — OXYCODONE HYDROCHLORIDE AND ACETAMINOPHEN 5; 325 MG/1; MG/1
1 TABLET ORAL
Status: DISCONTINUED | OUTPATIENT
Start: 2019-01-28 | End: 2019-01-28 | Stop reason: HOSPADM

## 2019-01-28 RX ORDER — MORPHINE SULFATE 4 MG/ML
4 INJECTION, SOLUTION INTRAMUSCULAR; INTRAVENOUS
Status: DISCONTINUED | OUTPATIENT
Start: 2019-01-28 | End: 2019-01-29 | Stop reason: HOSPADM

## 2019-01-28 RX ORDER — HYDRALAZINE HYDROCHLORIDE 20 MG/ML
5 INJECTION INTRAMUSCULAR; INTRAVENOUS EVERY 10 MIN PRN
Status: DISCONTINUED | OUTPATIENT
Start: 2019-01-28 | End: 2019-01-28 | Stop reason: HOSPADM

## 2019-01-28 RX ORDER — EPHEDRINE SULFATE 50 MG/ML
INJECTION INTRAVENOUS PRN
Status: DISCONTINUED | OUTPATIENT
Start: 2019-01-28 | End: 2019-01-28 | Stop reason: SDUPTHER

## 2019-01-28 RX ORDER — SODIUM CHLORIDE, SODIUM LACTATE, POTASSIUM CHLORIDE, CALCIUM CHLORIDE 600; 310; 30; 20 MG/100ML; MG/100ML; MG/100ML; MG/100ML
INJECTION, SOLUTION INTRAVENOUS CONTINUOUS
Status: DISCONTINUED | OUTPATIENT
Start: 2019-01-28 | End: 2019-01-29 | Stop reason: HOSPADM

## 2019-01-28 RX ORDER — HYDROCODONE BITARTRATE AND ACETAMINOPHEN 5; 325 MG/1; MG/1
1 TABLET ORAL EVERY 4 HOURS PRN
Status: DISCONTINUED | OUTPATIENT
Start: 2019-01-28 | End: 2019-01-29 | Stop reason: HOSPADM

## 2019-01-28 RX ORDER — LIDOCAINE HYDROCHLORIDE 10 MG/ML
0.5 INJECTION, SOLUTION EPIDURAL; INFILTRATION; INTRACAUDAL; PERINEURAL ONCE
Status: DISCONTINUED | OUTPATIENT
Start: 2019-01-28 | End: 2019-01-28 | Stop reason: HOSPADM

## 2019-01-28 RX ORDER — SODIUM CHLORIDE, SODIUM LACTATE, POTASSIUM CHLORIDE, CALCIUM CHLORIDE 600; 310; 30; 20 MG/100ML; MG/100ML; MG/100ML; MG/100ML
INJECTION, SOLUTION INTRAVENOUS CONTINUOUS PRN
Status: DISCONTINUED | OUTPATIENT
Start: 2019-01-28 | End: 2019-01-28 | Stop reason: SDUPTHER

## 2019-01-28 RX ORDER — GLYCOPYRROLATE 0.2 MG/ML
INJECTION INTRAMUSCULAR; INTRAVENOUS PRN
Status: DISCONTINUED | OUTPATIENT
Start: 2019-01-28 | End: 2019-01-28 | Stop reason: SDUPTHER

## 2019-01-28 RX ORDER — BUPIVACAINE HYDROCHLORIDE AND EPINEPHRINE 5; 5 MG/ML; UG/ML
INJECTION, SOLUTION EPIDURAL; INTRACAUDAL; PERINEURAL
Status: COMPLETED | OUTPATIENT
Start: 2019-01-28 | End: 2019-01-28

## 2019-01-28 RX ORDER — PROPOFOL 10 MG/ML
INJECTION, EMULSION INTRAVENOUS PRN
Status: DISCONTINUED | OUTPATIENT
Start: 2019-01-28 | End: 2019-01-28 | Stop reason: SDUPTHER

## 2019-01-28 RX ORDER — FENTANYL CITRATE 50 UG/ML
INJECTION, SOLUTION INTRAMUSCULAR; INTRAVENOUS PRN
Status: DISCONTINUED | OUTPATIENT
Start: 2019-01-28 | End: 2019-01-28 | Stop reason: SDUPTHER

## 2019-01-28 RX ORDER — CEFAZOLIN SODIUM 2 G/100ML
2 INJECTION, SOLUTION INTRAVENOUS
Status: COMPLETED | OUTPATIENT
Start: 2019-01-28 | End: 2019-01-28

## 2019-01-28 RX ORDER — MAGNESIUM HYDROXIDE 1200 MG/15ML
LIQUID ORAL
Status: COMPLETED | OUTPATIENT
Start: 2019-01-28 | End: 2019-01-28

## 2019-01-28 RX ADMIN — FENTANYL CITRATE 50 MCG: 50 INJECTION, SOLUTION INTRAMUSCULAR; INTRAVENOUS at 10:50

## 2019-01-28 RX ADMIN — Medication 10 ML: at 20:29

## 2019-01-28 RX ADMIN — BACITRACIN: 500 OINTMENT TOPICAL at 20:27

## 2019-01-28 RX ADMIN — FENTANYL CITRATE 50 MCG: 50 INJECTION, SOLUTION INTRAMUSCULAR; INTRAVENOUS at 07:59

## 2019-01-28 RX ADMIN — ROCURONIUM BROMIDE 25 MG: 10 INJECTION, SOLUTION INTRAVENOUS at 08:10

## 2019-01-28 RX ADMIN — GLYCOPYRROLATE 0.5 MG: 0.2 INJECTION INTRAMUSCULAR; INTRAVENOUS at 10:44

## 2019-01-28 RX ADMIN — EPHEDRINE SULFATE 10 MG: 50 INJECTION INTRAVENOUS at 10:36

## 2019-01-28 RX ADMIN — LIDOCAINE HYDROCHLORIDE 80 MG: 20 INJECTION, SOLUTION INFILTRATION; PERINEURAL at 07:36

## 2019-01-28 RX ADMIN — SODIUM CHLORIDE, POTASSIUM CHLORIDE, SODIUM LACTATE AND CALCIUM CHLORIDE: 600; 310; 30; 20 INJECTION, SOLUTION INTRAVENOUS at 09:45

## 2019-01-28 RX ADMIN — FENTANYL CITRATE 50 MCG: 50 INJECTION INTRAMUSCULAR; INTRAVENOUS at 12:26

## 2019-01-28 RX ADMIN — HYDROCODONE BITARTRATE AND ACETAMINOPHEN 1 TABLET: 5; 325 TABLET ORAL at 15:55

## 2019-01-28 RX ADMIN — SENNOSIDES AND DOCUSATE SODIUM 1 TABLET: 8.6; 5 TABLET ORAL at 15:52

## 2019-01-28 RX ADMIN — SODIUM CHLORIDE: 9 INJECTION, SOLUTION INTRAVENOUS at 22:38

## 2019-01-28 RX ADMIN — SODIUM CHLORIDE: 9 INJECTION, SOLUTION INTRAVENOUS at 15:49

## 2019-01-28 RX ADMIN — FENTANYL CITRATE 50 MCG: 50 INJECTION INTRAMUSCULAR; INTRAVENOUS at 11:55

## 2019-01-28 RX ADMIN — ONDANSETRON 4 MG: 2 INJECTION INTRAMUSCULAR; INTRAVENOUS at 07:36

## 2019-01-28 RX ADMIN — DEXAMETHASONE SODIUM PHOSPHATE 8 MG: 4 INJECTION, SOLUTION INTRAMUSCULAR; INTRAVENOUS at 07:36

## 2019-01-28 RX ADMIN — CEFAZOLIN SODIUM 2 G: 2 INJECTION, SOLUTION INTRAVENOUS at 07:27

## 2019-01-28 RX ADMIN — ROCURONIUM BROMIDE 20 MG: 10 INJECTION, SOLUTION INTRAVENOUS at 09:42

## 2019-01-28 RX ADMIN — PROPOFOL 180 MG: 10 INJECTION, EMULSION INTRAVENOUS at 07:36

## 2019-01-28 RX ADMIN — SENNOSIDES AND DOCUSATE SODIUM 1 TABLET: 8.6; 5 TABLET ORAL at 20:26

## 2019-01-28 RX ADMIN — SODIUM CHLORIDE, POTASSIUM CHLORIDE, SODIUM LACTATE AND CALCIUM CHLORIDE: 600; 310; 30; 20 INJECTION, SOLUTION INTRAVENOUS at 07:31

## 2019-01-28 RX ADMIN — Medication 3 MG: at 10:44

## 2019-01-28 RX ADMIN — FENTANYL CITRATE 50 MCG: 50 INJECTION, SOLUTION INTRAMUSCULAR; INTRAVENOUS at 07:36

## 2019-01-28 RX ADMIN — ROCURONIUM BROMIDE 10 MG: 10 INJECTION, SOLUTION INTRAVENOUS at 10:22

## 2019-01-28 RX ADMIN — CEFAZOLIN SODIUM 2 G: 2 INJECTION, SOLUTION INTRAVENOUS at 15:49

## 2019-01-28 RX ADMIN — ROCURONIUM BROMIDE 50 MG: 10 INJECTION, SOLUTION INTRAVENOUS at 07:36

## 2019-01-28 RX ADMIN — CEFAZOLIN SODIUM 2 G: 2 INJECTION, SOLUTION INTRAVENOUS at 22:36

## 2019-01-28 ASSESSMENT — PULMONARY FUNCTION TESTS
PIF_VALUE: 16
PIF_VALUE: 34
PIF_VALUE: 15
PIF_VALUE: 31
PIF_VALUE: 32
PIF_VALUE: 33
PIF_VALUE: 26
PIF_VALUE: 1
PIF_VALUE: 31
PIF_VALUE: 31
PIF_VALUE: 22
PIF_VALUE: 1
PIF_VALUE: 31
PIF_VALUE: 6
PIF_VALUE: 32
PIF_VALUE: 22
PIF_VALUE: 4
PIF_VALUE: 32
PIF_VALUE: 20
PIF_VALUE: 24
PIF_VALUE: 31
PIF_VALUE: 4
PIF_VALUE: 35
PIF_VALUE: 32
PIF_VALUE: 21
PIF_VALUE: 31
PIF_VALUE: 30
PIF_VALUE: 4
PIF_VALUE: 14
PIF_VALUE: 31
PIF_VALUE: 0
PIF_VALUE: 32
PIF_VALUE: 34
PIF_VALUE: 17
PIF_VALUE: 30
PIF_VALUE: 31
PIF_VALUE: 31
PIF_VALUE: 30
PIF_VALUE: 32
PIF_VALUE: 22
PIF_VALUE: 31
PIF_VALUE: 32
PIF_VALUE: 31
PIF_VALUE: 11
PIF_VALUE: 15
PIF_VALUE: 31
PIF_VALUE: 33
PIF_VALUE: 32
PIF_VALUE: 30
PIF_VALUE: 32
PIF_VALUE: 33
PIF_VALUE: 2
PIF_VALUE: 33
PIF_VALUE: 15
PIF_VALUE: 30
PIF_VALUE: 33
PIF_VALUE: 30
PIF_VALUE: 21
PIF_VALUE: 36
PIF_VALUE: 32
PIF_VALUE: 31
PIF_VALUE: 17
PIF_VALUE: 32
PIF_VALUE: 17
PIF_VALUE: 27
PIF_VALUE: 37
PIF_VALUE: 32
PIF_VALUE: 32
PIF_VALUE: 31
PIF_VALUE: 32
PIF_VALUE: 16
PIF_VALUE: 32
PIF_VALUE: 32
PIF_VALUE: 31
PIF_VALUE: 11
PIF_VALUE: 16
PIF_VALUE: 33
PIF_VALUE: 31
PIF_VALUE: 16
PIF_VALUE: 22
PIF_VALUE: 32
PIF_VALUE: 28
PIF_VALUE: 31
PIF_VALUE: 35
PIF_VALUE: 32
PIF_VALUE: 14
PIF_VALUE: 32
PIF_VALUE: 15
PIF_VALUE: 17
PIF_VALUE: 1
PIF_VALUE: 31
PIF_VALUE: 32
PIF_VALUE: 29
PIF_VALUE: 4
PIF_VALUE: 1
PIF_VALUE: 1
PIF_VALUE: 32
PIF_VALUE: 1
PIF_VALUE: 32
PIF_VALUE: 21
PIF_VALUE: 17
PIF_VALUE: 32
PIF_VALUE: 34
PIF_VALUE: 28
PIF_VALUE: 31
PIF_VALUE: 31
PIF_VALUE: 34
PIF_VALUE: 32
PIF_VALUE: 17
PIF_VALUE: 31
PIF_VALUE: 16
PIF_VALUE: 26
PIF_VALUE: 31
PIF_VALUE: 17
PIF_VALUE: 24
PIF_VALUE: 31
PIF_VALUE: 31
PIF_VALUE: 29
PIF_VALUE: 17
PIF_VALUE: 34
PIF_VALUE: 31
PIF_VALUE: 17
PIF_VALUE: 32
PIF_VALUE: 31
PIF_VALUE: 16
PIF_VALUE: 23
PIF_VALUE: 18
PIF_VALUE: 17
PIF_VALUE: 31
PIF_VALUE: 15
PIF_VALUE: 32
PIF_VALUE: 32
PIF_VALUE: 31
PIF_VALUE: 31
PIF_VALUE: 33
PIF_VALUE: 31
PIF_VALUE: 31
PIF_VALUE: 32
PIF_VALUE: 22
PIF_VALUE: 1
PIF_VALUE: 8
PIF_VALUE: 24
PIF_VALUE: 3
PIF_VALUE: 13
PIF_VALUE: 4
PIF_VALUE: 28
PIF_VALUE: 33
PIF_VALUE: 32
PIF_VALUE: 32
PIF_VALUE: 14
PIF_VALUE: 6
PIF_VALUE: 4
PIF_VALUE: 32
PIF_VALUE: 17
PIF_VALUE: 31
PIF_VALUE: 34
PIF_VALUE: 32
PIF_VALUE: 34
PIF_VALUE: 31
PIF_VALUE: 32
PIF_VALUE: 33
PIF_VALUE: 31
PIF_VALUE: 24
PIF_VALUE: 21
PIF_VALUE: 33
PIF_VALUE: 31
PIF_VALUE: 34
PIF_VALUE: 32
PIF_VALUE: 31
PIF_VALUE: 32
PIF_VALUE: 36
PIF_VALUE: 32
PIF_VALUE: 31
PIF_VALUE: 15
PIF_VALUE: 31
PIF_VALUE: 14
PIF_VALUE: 17
PIF_VALUE: 32
PIF_VALUE: 32
PIF_VALUE: 22
PIF_VALUE: 32
PIF_VALUE: 31
PIF_VALUE: 32
PIF_VALUE: 32
PIF_VALUE: 33
PIF_VALUE: 32
PIF_VALUE: 22
PIF_VALUE: 31
PIF_VALUE: 31
PIF_VALUE: 9
PIF_VALUE: 7
PIF_VALUE: 24
PIF_VALUE: 34
PIF_VALUE: 31
PIF_VALUE: 21
PIF_VALUE: 31
PIF_VALUE: 22
PIF_VALUE: 31
PIF_VALUE: 32
PIF_VALUE: 35

## 2019-01-28 ASSESSMENT — PAIN DESCRIPTION - FREQUENCY: FREQUENCY: INTERMITTENT

## 2019-01-28 ASSESSMENT — ENCOUNTER SYMPTOMS: SHORTNESS OF BREATH: 0

## 2019-01-28 ASSESSMENT — PAIN DESCRIPTION - PAIN TYPE
TYPE: SURGICAL PAIN

## 2019-01-28 ASSESSMENT — PAIN DESCRIPTION - LOCATION
LOCATION: ABDOMEN

## 2019-01-28 ASSESSMENT — PAIN DESCRIPTION - ORIENTATION
ORIENTATION: RIGHT
ORIENTATION: RIGHT

## 2019-01-28 ASSESSMENT — PAIN DESCRIPTION - ONSET: ONSET: ON-GOING

## 2019-01-28 ASSESSMENT — PAIN - FUNCTIONAL ASSESSMENT: PAIN_FUNCTIONAL_ASSESSMENT: 0-10

## 2019-01-28 ASSESSMENT — PAIN SCALES - GENERAL
PAINLEVEL_OUTOF10: 8
PAINLEVEL_OUTOF10: 4
PAINLEVEL_OUTOF10: 7

## 2019-01-28 ASSESSMENT — PAIN DESCRIPTION - DESCRIPTORS: DESCRIPTORS: SORE

## 2019-01-28 NOTE — DISCHARGE SUMMARY
Urology Discharge Summary  [facility]    Provider: Nick Hernandez MD Patient ID:  Admission Date: 2019 Name: Suhail Otto Date: 2019 MRN: [id number]   Patient Location: [facility]-[id number] : 1950  Attending: No att. providers found Date of Service: 2019  PCP: Erna Buenrostro,      Discharge date: 2019    Discharge Diagnoses:   Prostate Cancer pt2N0    Reason for Hospitalization: Dread Robles is a 79 y.o. male who was admitted post surgery. Operations/Procedures Performed:   1. Robotic assisted laparoscopic prostatectomy with bilateral lymph node dissection    Discharge Condition:  good    Hospital Course: The patient was admitted on 2019 and underwent the above mentioned procedure(s). He tolerated the procedure well with no apparent complications. Please see full operative report for further details regarding the operation. Postoperatively the patient remained in stable condition. Pain was controlled post-operatively with oral and IV medication. Diet was advanced and this was tolerated well. At the time of discharge, the patient was tolerating oral food and hydration, was ambulating without difficulty, and pain was controlled on oral medications. The patient was determined to be suitable for discharge and the patient felt comfortable with that decision. Patient was discharged in good condition. Consults: none     Significant Diagnostic findings: labs, pathology    Discharge Exam:  Blood pressure (!) 145/56, pulse 66, temperature 98.2 °F (36.8 °C), temperature source Oral, resp. rate 16, height 5' 8\" (1.727 m), weight 184 lb 8 oz (83.7 kg), SpO2 97 %.   Gen - NAD, AOx3  CV - RRR  Resp - CTAB  Abd - soft, NT/ND, inc c/d/i  Ext - warm, well-perfused    Disposition: Discharged home in good condition    Discharge Medications:  Discharge Medication List as of 2019  5:46 PM      START taking these medications    Details   HYDROcodone-acetaminophen ([address]) 5-325 MG per tablet Take 1 tablet by mouth every 6 hours as needed for Pain for up to 5 days. Intended supply: 3 days. Take lowest dose possible to manage pain., Disp-15 tablet, R-0Print      senna-docusate (PERICOLACE) 8.6-50 MG per tablet Take 1 tablet by mouth 2 times daily, Disp-50 tablet, R-0Print         CONTINUE these medications which have NOT CHANGED    Details   aspirin 81 MG tablet Take 1 tablet by mouth daily, Disp-90 tablet, R-3Adjust Sig      atorvastatin (LIPITOR) 10 MG tablet Take 1 tablet by mouth nightly, Disp-90 tablet, R-3Normal      tadalafil (CIALIS) 5 MG tablet TAKE 1 TABLET BY MOUTH AS NEEDED FOR ERECTILE DYSFUNCTION, Disp-8 tablet, R-0Normal         STOP taking these medications       Tamsulosin HCl (FLOMAX PO) Comments:   Reason for Stopping: Follow up Appointment:  Please call [phone] to schedule a follow up appointment with Dr. Yfn Ramos in 1 weeks.     Tacho Serrato MD  2/5/2019

## 2019-01-28 NOTE — OP NOTE
Urology Operative Report  [facility]    Provider: Yovani Navarro MD Patient ID:  Admission Date: 2019 Name: Noman Stroud Date: 2019  MRN: [id number]   Patient Location: OR/NONE : 1950  Attending: Yovani Navarro MD Date of Service: 2019  PCP: Jacquelyn Mckeon DO     Date of Operation: 2019     Preoperative Diagnosis: Prostate Cancer    Postoperative Diagnosis: same    Procedure:    1. Robotic assisted laparoscopic radical prostatectomy  2. Right side pelvic lymphadenectomy  3. Left side pelvic lymphadenectomy  4. Layered closure of the anterior abdominal wall    Surgeon:   Yovani Navarro MD    Anesthesia: General endotracheal anesthesia    Indications: Luna Ramirez is a 79 y.o. male who presents for the above named surgery. Informed consent was obtained and the risks, benefits, and details of the procedure were explained to the patient who elected to proceed. Details of Procedure:  After informed consent was obtained, making the patient aware of the risks, benefits and alternatives to the procedure, he was taken back to the surgical suite and positioned in a supine position on the surgical table. SCDs were placed on the lower extremities. General anesthesia was induced, and he was transferred to a dorsal lithotomy position. All pressure points were carefully padded. His genitalia and abdomen were prepped and draped in the usual sterile fashion. A timeout procedure was performed, properly identifying the patient, procedure, and operative site. A álvarez catheter was placed and the bladder was drained. A supraumbilical incision was made, and a Veress needle was used to introduce pneumoperitoneum requiring one attempt(s). A camera port was then introduced into the abdomen, and the intra-abdominal contents were inspected for any sign of injury. There was none.  The laparoscopic ports were placed in the usual fashion, two 8 mm ports in the left lower prostate and removed by the bedside assistant. A dorsal vein stitch of 2-0 vicryl was used to tie off the vascular complex. At this point, attention was turned to the bladder neck. The prostato-vesicle junction was identified and the Gibson balloon was deflated. The bladder neck was opened using the monopolar scissors. Care was taken to preserve the bladder neck. The urethral catheter was pulled back and the prostate elevated under tension using the fourth arm. The posterior bladder neck was then divided until the bilateral seminal vesicles and bilateral vas deferens were identified in the midline. They were delivered into the anterior surgical field. A nerve-sparing procedure was attempted on the RIGHT side. The nervous tissues were carefully dissected off the lateral aspect of the prostate. No electrical energy was used, and minimal traction was applied. The lateral pelvic vascular pedicles were taken down using a combination of bipolar electrocautery with hem-o-lock clips, completing the posterior-lateral dissections. The dorsal vein complex was transected followed by transection of the urethral-prostatic junction using sharp dissection with the monopolar scissors. An adequate stump of urethra was left in place. The prostate was completely free and was placed into a specimen bag. Hemostasis was ensured. A Silas stitch was then placed using a 3-0 stratafix stitch to reapproximate the posterior bladder fascia with the posterior periurethral fascia. The urethrovesical anastomosis was accomplished using two interlocked 3-0 stratafix stitches in a running fashion starting at the 6 o'clock position and running circumferentially to the 12 o'clock position. These were tied in the midline. After completion of the anastomosis, a new 18-[place] Gibson catheter was placed into the bladder and the balloon was inflated with 10mL of sterile water. The bladder was irrigated with 150 mL of normal saline.  There was no evidence of any leakage. The catheter was allowed to remain in the bladder. Hemostasis was again ensured. The midline incision was extended, allowing delivery of the specimen bags through the midline. The midline incision was closed with 0-ethibond interrupted stitches for the fascial level followed by 3-0 Vicryl deep dermal stitches. All incisions were closed with 4-0 monofilament and dermabond. The new 18-[place] Gibson catheter had a statLock positioned. At the end of the procedure all needle, lap, instrument and sponge counts were correct. The patient tolerated the procedure well, was extubated without issue in the operating room, and was transported to the PACU in stable condition. Findings: There did appear to be an adequate surgical margin. There was a watertight urethral vesicle anastomosis. Estimated Blood Loss: Approximately 50mL                  Drains:   18 Fr urethral Gibson catheter to gravity drainage          Specimens:   1. Prostate, bilateral seminal vesicles, and anterior prostate fat. 2. Right side pelvic lymph nodes. 3. Left side pelvic lymph nodes. Complications: none apparent           Disposition:  PACU - hemodynamically stable.             Lili Morales MD  1/28/2019

## 2019-01-28 NOTE — H&P
The history and physical was reviewed. The patient was seen and examined in pre-op and his right side was marked with his participation. He had a chance to ask questions which were answered. There has been no interval change. Plan to continue to the OR for RARLP.     Jorge Brown  1/28/2019

## 2019-01-28 NOTE — PROGRESS NOTES
Pt awake and oriented, abd incisions CD&Ix6, - ice pack applied, álvarez in place red in color- lightning up, medicated with fentanyl for pain, vss, NSR on tele.

## 2019-01-28 NOTE — PROGRESS NOTES
Pt awakening, abd incisions CD&I- ice pack applied, vss, O2 saturations stable on 3 LNC, álvarez in place, NSR on tele- will monitor

## 2019-01-29 VITALS
TEMPERATURE: 98.2 F | BODY MASS INDEX: 27.96 KG/M2 | WEIGHT: 184.5 LBS | OXYGEN SATURATION: 97 % | DIASTOLIC BLOOD PRESSURE: 56 MMHG | SYSTOLIC BLOOD PRESSURE: 145 MMHG | RESPIRATION RATE: 16 BRPM | HEART RATE: 66 BPM | HEIGHT: 68 IN

## 2019-01-29 LAB
ANION GAP SERPL CALCULATED.3IONS-SCNC: 9 MMOL/L (ref 3–16)
BUN BLDV-MCNC: 12 MG/DL (ref 7–20)
CALCIUM SERPL-MCNC: 9 MG/DL (ref 8.3–10.6)
CHLORIDE BLD-SCNC: 107 MMOL/L (ref 99–110)
CO2: 26 MMOL/L (ref 21–32)
CREAT SERPL-MCNC: 0.9 MG/DL (ref 0.8–1.3)
GFR AFRICAN AMERICAN: >60
GFR NON-AFRICAN AMERICAN: >60
GLUCOSE BLD-MCNC: 110 MG/DL (ref 70–99)
HCT VFR BLD CALC: 37.6 % (ref 40.5–52.5)
HEMOGLOBIN: 12.4 G/DL (ref 13.5–17.5)
POTASSIUM SERPL-SCNC: 4.8 MMOL/L (ref 3.5–5.1)
SODIUM BLD-SCNC: 142 MMOL/L (ref 136–145)

## 2019-01-29 PROCEDURE — 36415 COLL VENOUS BLD VENIPUNCTURE: CPT

## 2019-01-29 PROCEDURE — 80048 BASIC METABOLIC PNL TOTAL CA: CPT

## 2019-01-29 PROCEDURE — 85018 HEMOGLOBIN: CPT

## 2019-01-29 PROCEDURE — 6370000000 HC RX 637 (ALT 250 FOR IP): Performed by: UROLOGY

## 2019-01-29 PROCEDURE — 85014 HEMATOCRIT: CPT

## 2019-01-29 PROCEDURE — G0378 HOSPITAL OBSERVATION PER HR: HCPCS

## 2019-01-29 PROCEDURE — 2580000003 HC RX 258: Performed by: UROLOGY

## 2019-01-29 RX ADMIN — BACITRACIN: 500 OINTMENT TOPICAL at 08:39

## 2019-01-29 RX ADMIN — SENNOSIDES AND DOCUSATE SODIUM 1 TABLET: 8.6; 5 TABLET ORAL at 08:39

## 2019-01-29 RX ADMIN — SODIUM CHLORIDE: 9 INJECTION, SOLUTION INTRAVENOUS at 07:28

## 2019-01-29 RX ADMIN — BISACODYL 10 MG: 10 SUPPOSITORY RECTAL at 11:49

## 2019-01-29 NOTE — PLAN OF CARE
Problem: Pain:  Goal: Control of acute pain  Control of acute pain   Outcome: Ongoing  Patient able to communicate pain level by using pain scale 0-10 to nurse and understands to use call light when needing pain medication      Problem: Skin Integrity:  Goal: Demonstration of wound healing without infection will improve  Demonstration of wound healing without infection will improve   Outcome: Ongoing  No s/s of infection at surgical sites. Edges well approximated. Will continue to monitor. Problem: Falls - Risk of:  Goal: Will remain free from falls  Will remain free from falls   Outcome: Ongoing  Free of falls. Call light in reach. Bed in lowest position. Bed alarm on. Personal items in reach.

## 2019-01-29 NOTE — PROGRESS NOTES
Morning assessment complete; pt rates pain 0/10    POC discussed - pt encouraged to ambulate as often as tolerated; The care plan and education has been reviewed and mutually agreed upon with the patient.      Call light and all needs in reach; pt denies needs at this time

## 2019-01-29 NOTE — PROGRESS NOTES
gu pod 1    vss afeb  abd distended  Labs nl  Good output    Path localized and nodes neg    impr  Ileus    recc    Ambulate  ivf  Cont post op care

## 2019-01-29 NOTE — PLAN OF CARE
Problem: Pain:  Goal: Control of acute pain  Control of acute pain   Outcome: Ongoing  Pt using norco for pain    Problem: Skin Integrity:  Goal: Demonstration of wound healing without infection will improve  Demonstration of wound healing without infection will improve   Outcome: Ongoing      Problem: Falls - Risk of:  Goal: Will remain free from falls  Will remain free from falls   Outcome: Ongoing  Pt remains free from falls. Fall precautions in place--bed in lowest position, call light within reach. Pt aware to call for assistance before getting up.

## 2019-01-30 ENCOUNTER — CARE COORDINATION (OUTPATIENT)
Dept: CASE MANAGEMENT | Age: 69
End: 2019-01-30

## 2019-01-30 DIAGNOSIS — C61 PROSTATE CANCER (HCC): Primary | ICD-10-CM

## 2019-02-12 DIAGNOSIS — I25.110 CORONARY ARTERY DISEASE INVOLVING NATIVE CORONARY ARTERY OF NATIVE HEART WITH UNSTABLE ANGINA PECTORIS (HCC): ICD-10-CM

## 2019-02-12 RX ORDER — ATORVASTATIN CALCIUM 10 MG/1
10 TABLET, FILM COATED ORAL NIGHTLY
Qty: 90 TABLET | Refills: 3 | Status: SHIPPED | OUTPATIENT
Start: 2019-02-12 | End: 2021-02-03 | Stop reason: ALTCHOICE

## 2019-02-13 ENCOUNTER — CARE COORDINATION (OUTPATIENT)
Dept: CASE MANAGEMENT | Age: 69
End: 2019-02-13

## 2019-03-28 ENCOUNTER — OFFICE VISIT (OUTPATIENT)
Dept: FAMILY MEDICINE CLINIC | Age: 69
End: 2019-03-28
Payer: COMMERCIAL

## 2019-03-28 ENCOUNTER — HOSPITAL ENCOUNTER (OUTPATIENT)
Dept: GENERAL RADIOLOGY | Age: 69
Discharge: HOME | End: 2019-03-28
Payer: COMMERCIAL

## 2019-03-28 VITALS
HEART RATE: 61 BPM | OXYGEN SATURATION: 98 % | HEIGHT: 68 IN | DIASTOLIC BLOOD PRESSURE: 60 MMHG | WEIGHT: 180 LBS | BODY MASS INDEX: 27.28 KG/M2 | SYSTOLIC BLOOD PRESSURE: 104 MMHG | RESPIRATION RATE: 14 BRPM

## 2019-03-28 DIAGNOSIS — M25.552 LEFT HIP PAIN: ICD-10-CM

## 2019-03-28 DIAGNOSIS — M79.652 ACUTE PAIN OF LEFT THIGH: ICD-10-CM

## 2019-03-28 DIAGNOSIS — M25.552 LEFT HIP PAIN: Primary | ICD-10-CM

## 2019-03-28 PROCEDURE — 96372 THER/PROPH/DIAG INJ SC/IM: CPT | Performed by: FAMILY MEDICINE

## 2019-03-28 PROCEDURE — 99213 OFFICE O/P EST LOW 20 MIN: CPT | Performed by: FAMILY MEDICINE

## 2019-03-28 PROCEDURE — 73502 X-RAY EXAM HIP UNI 2-3 VIEWS: CPT

## 2019-03-28 RX ORDER — KETOROLAC TROMETHAMINE 30 MG/ML
60 INJECTION, SOLUTION INTRAMUSCULAR; INTRAVENOUS ONCE
Status: COMPLETED | OUTPATIENT
Start: 2019-03-28 | End: 2019-03-28

## 2019-03-28 RX ORDER — MELOXICAM 15 MG/1
15 TABLET ORAL DAILY
Qty: 30 TABLET | Refills: 0 | Status: ON HOLD | OUTPATIENT
Start: 2019-03-28 | End: 2019-04-06 | Stop reason: HOSPADM

## 2019-03-28 RX ORDER — TIZANIDINE 4 MG/1
4 TABLET ORAL 3 TIMES DAILY
Qty: 30 TABLET | Refills: 1 | Status: SHIPPED | OUTPATIENT
Start: 2019-03-28 | End: 2019-04-07

## 2019-03-28 RX ADMIN — KETOROLAC TROMETHAMINE 60 MG: 30 INJECTION, SOLUTION INTRAMUSCULAR; INTRAVENOUS at 11:58

## 2019-03-28 ASSESSMENT — PATIENT HEALTH QUESTIONNAIRE - PHQ9
2. FEELING DOWN, DEPRESSED OR HOPELESS: 0
SUM OF ALL RESPONSES TO PHQ QUESTIONS 1-9: 0
SUM OF ALL RESPONSES TO PHQ9 QUESTIONS 1 & 2: 0
SUM OF ALL RESPONSES TO PHQ QUESTIONS 1-9: 0
1. LITTLE INTEREST OR PLEASURE IN DOING THINGS: 0

## 2019-03-29 ENCOUNTER — TELEPHONE (OUTPATIENT)
Dept: FAMILY MEDICINE CLINIC | Age: 69
End: 2019-03-29

## 2019-03-29 ENCOUNTER — ANCILLARY ORDERS (OUTPATIENT)
Dept: FAMILY MEDICINE CLINIC | Age: 69
End: 2019-03-29

## 2019-03-29 ENCOUNTER — HOSPITAL ENCOUNTER (OUTPATIENT)
Dept: ULTRASOUND IMAGING | Age: 69
Discharge: HOME | End: 2019-03-29
Payer: COMMERCIAL

## 2019-03-29 DIAGNOSIS — N30.01 ACUTE CYSTITIS WITH HEMATURIA: Primary | ICD-10-CM

## 2019-03-29 DIAGNOSIS — M25.552 LEFT HIP PAIN: ICD-10-CM

## 2019-03-29 DIAGNOSIS — M79.652 ACUTE PAIN OF LEFT THIGH: ICD-10-CM

## 2019-03-29 DIAGNOSIS — N30.01 ACUTE CYSTITIS WITH HEMATURIA: ICD-10-CM

## 2019-03-29 DIAGNOSIS — M79.605 LEG PAIN, ANTERIOR, LEFT: ICD-10-CM

## 2019-03-29 DIAGNOSIS — M79.605 LEG PAIN, ANTERIOR, LEFT: Primary | ICD-10-CM

## 2019-03-29 LAB
BILIRUBIN, POC: NORMAL
BLOOD URINE, POC: NORMAL
CLARITY, POC: NORMAL
COLOR, POC: NORMAL
GLUCOSE URINE, POC: NORMAL
KETONES, POC: NORMAL
LEUKOCYTE EST, POC: NORMAL
NITRITE, POC: NORMAL
PH, POC: 5.5
PROTEIN, POC: NORMAL
SPECIFIC GRAVITY, POC: 1.02
UROBILINOGEN, POC: NORMAL

## 2019-03-29 PROCEDURE — 81002 URINALYSIS NONAUTO W/O SCOPE: CPT | Performed by: FAMILY MEDICINE

## 2019-03-29 PROCEDURE — 93971 EXTREMITY STUDY: CPT

## 2019-03-29 RX ORDER — NITROFURANTOIN 25; 75 MG/1; MG/1
100 CAPSULE ORAL 2 TIMES DAILY
Qty: 14 CAPSULE | Refills: 0 | Status: ON HOLD | OUTPATIENT
Start: 2019-03-29 | End: 2019-03-30

## 2019-03-29 NOTE — TELEPHONE ENCOUNTER
Pt wife called regarding labs that were supposed to be drawn (CBC) pt also has dark urine and wanted to know if we could check his urine. Pt has US today and was wanting to stop by and do these after the [address]. Pt is also wanting to know if he can get something to help him sleep. Pt uses [facility].

## 2019-03-30 ENCOUNTER — NURSE TRIAGE (OUTPATIENT)
Dept: OTHER | Facility: CLINIC | Age: 69
End: 2019-03-30

## 2019-03-30 ENCOUNTER — APPOINTMENT (OUTPATIENT)
Dept: CT IMAGING | Age: 69
DRG: 871 | End: 2019-03-30
Payer: COMMERCIAL

## 2019-03-30 ENCOUNTER — HOSPITAL ENCOUNTER (INPATIENT)
Age: 69
LOS: 8 days | Discharge: HOME HEALTH | DRG: 871 | End: 2019-04-07
Attending: EMERGENCY MEDICINE | Admitting: INTERNAL MEDICINE
Payer: COMMERCIAL

## 2019-03-30 DIAGNOSIS — K68.12 PSOAS ABSCESS (HCC): Primary | ICD-10-CM

## 2019-03-30 LAB
A/G RATIO: 0.5 (ref 1.1–2.2)
ALBUMIN SERPL-MCNC: 2.3 G/DL (ref 3.4–5)
ALP BLD-CCNC: 249 U/L (ref 40–129)
ALT SERPL-CCNC: 63 U/L (ref 10–40)
ANION GAP SERPL CALCULATED.3IONS-SCNC: 13 MMOL/L (ref 3–16)
AST SERPL-CCNC: 83 U/L (ref 15–37)
BACTERIA: ABNORMAL /HPF
BANDED NEUTROPHILS RELATIVE PERCENT: 14 % (ref 0–7)
BANDED NEUTROPHILS RELATIVE PERCENT: 18 % (ref 0–7)
BASOPHILS ABSOLUTE: 0 K/UL (ref 0–0.2)
BASOPHILS ABSOLUTE: 0 K/UL (ref 0–0.2)
BASOPHILS RELATIVE PERCENT: 0 %
BASOPHILS RELATIVE PERCENT: 0 %
BILIRUB SERPL-MCNC: 2.2 MG/DL (ref 0–1)
BILIRUBIN URINE: ABNORMAL
BLOOD, URINE: ABNORMAL
BUN BLDV-MCNC: 27 MG/DL (ref 7–20)
CALCIUM SERPL-MCNC: 8.8 MG/DL (ref 8.3–10.6)
CHLORIDE BLD-SCNC: 94 MMOL/L (ref 99–110)
CLARITY: ABNORMAL
CO2: 21 MMOL/L (ref 21–32)
COLOR: ABNORMAL
CREAT SERPL-MCNC: 1.2 MG/DL (ref 0.8–1.3)
EOSINOPHILS ABSOLUTE: 0 K/UL (ref 0–0.6)
EOSINOPHILS ABSOLUTE: 0.3 K/UL (ref 0–0.6)
EOSINOPHILS RELATIVE PERCENT: 0 %
EOSINOPHILS RELATIVE PERCENT: 1 %
GFR AFRICAN AMERICAN: >60
GFR NON-AFRICAN AMERICAN: >60
GLOBULIN: 4.2 G/DL
GLUCOSE BLD-MCNC: 126 MG/DL (ref 70–99)
GLUCOSE URINE: NEGATIVE MG/DL
HCT VFR BLD CALC: 38.6 % (ref 40.5–52.5)
HCT VFR BLD CALC: 40.9 % (ref 40.5–52.5)
HEMOGLOBIN: 12.6 G/DL (ref 13.5–17.5)
HEMOGLOBIN: 13.4 G/DL (ref 13.5–17.5)
KETONES, URINE: ABNORMAL MG/DL
LACTIC ACID: 1 MMOL/L (ref 0.4–2)
LEUKOCYTE ESTERASE, URINE: ABNORMAL
LYMPHOCYTES ABSOLUTE: 0.9 K/UL (ref 1–5.1)
LYMPHOCYTES ABSOLUTE: 1.5 K/UL (ref 1–5.1)
LYMPHOCYTES RELATIVE PERCENT: 3 %
LYMPHOCYTES RELATIVE PERCENT: 5 %
MCH RBC QN AUTO: 27.9 PG (ref 26–34)
MCH RBC QN AUTO: 28.1 PG (ref 26–34)
MCHC RBC AUTO-ENTMCNC: 32.7 G/DL (ref 31–36)
MCHC RBC AUTO-ENTMCNC: 32.7 G/DL (ref 31–36)
MCV RBC AUTO: 85.3 FL (ref 80–100)
MCV RBC AUTO: 85.8 FL (ref 80–100)
MICROSCOPIC EXAMINATION: YES
MONOCYTES ABSOLUTE: 1.2 K/UL (ref 0–1.3)
MONOCYTES ABSOLUTE: 1.5 K/UL (ref 0–1.3)
MONOCYTES RELATIVE PERCENT: 4 %
MONOCYTES RELATIVE PERCENT: 5 %
MYELOCYTE PERCENT: 2 %
NEUTROPHILS ABSOLUTE: 26.4 K/UL (ref 1.7–7.7)
NEUTROPHILS ABSOLUTE: 27.8 K/UL (ref 1.7–7.7)
NEUTROPHILS RELATIVE PERCENT: 72 %
NEUTROPHILS RELATIVE PERCENT: 76 %
NITRITE, URINE: NEGATIVE
NUCLEATED RED BLOOD CELLS: 1 /100 WBC
PDW BLD-RTO: 13.9 % (ref 12.4–15.4)
PDW BLD-RTO: 14.3 % (ref 12.4–15.4)
PH UA: 6 (ref 5–8)
PLATELET # BLD: 268 K/UL (ref 135–450)
PLATELET # BLD: 302 K/UL (ref 135–450)
PLATELET SLIDE REVIEW: ADEQUATE
PMV BLD AUTO: 8.8 FL (ref 5–10.5)
PMV BLD AUTO: 9.7 FL (ref 5–10.5)
POLYCHROMASIA: ABNORMAL
POTASSIUM REFLEX MAGNESIUM: 3.7 MMOL/L (ref 3.5–5.1)
PROTEIN UA: 100 MG/DL
RBC # BLD: 4.53 M/UL (ref 4.2–5.9)
RBC # BLD: 4.77 M/UL (ref 4.2–5.9)
RBC UA: ABNORMAL /HPF (ref 0–2)
SEDIMENTATION RATE, ERYTHROCYTE: 75 MM/HR (ref 0–20)
SLIDE REVIEW: ABNORMAL
SLIDE REVIEW: ABNORMAL
SODIUM BLD-SCNC: 128 MMOL/L (ref 136–145)
SPECIFIC GRAVITY UA: 1.03 (ref 1–1.03)
TOTAL PROTEIN: 6.5 G/DL (ref 6.4–8.2)
TOXIC GRANULATION: PRESENT
TOXIC GRANULATION: PRESENT
URINE REFLEX TO CULTURE: YES
URINE TYPE: ABNORMAL
UROBILINOGEN, URINE: 4 E.U./DL
WBC # BLD: 29.3 K/UL (ref 4–11)
WBC # BLD: 30.2 K/UL (ref 4–11)
WBC UA: ABNORMAL /HPF (ref 0–5)

## 2019-03-30 PROCEDURE — 6370000000 HC RX 637 (ALT 250 FOR IP): Performed by: EMERGENCY MEDICINE

## 2019-03-30 PROCEDURE — 80053 COMPREHEN METABOLIC PANEL: CPT

## 2019-03-30 PROCEDURE — 2580000003 HC RX 258: Performed by: NURSE PRACTITIONER

## 2019-03-30 PROCEDURE — 85652 RBC SED RATE AUTOMATED: CPT

## 2019-03-30 PROCEDURE — 87040 BLOOD CULTURE FOR BACTERIA: CPT

## 2019-03-30 PROCEDURE — 6370000000 HC RX 637 (ALT 250 FOR IP): Performed by: NURSE PRACTITIONER

## 2019-03-30 PROCEDURE — 83605 ASSAY OF LACTIC ACID: CPT

## 2019-03-30 PROCEDURE — 2500000003 HC RX 250 WO HCPCS: Performed by: SURGERY

## 2019-03-30 PROCEDURE — 85025 COMPLETE CBC W/AUTO DIFF WBC: CPT

## 2019-03-30 PROCEDURE — 2060000000 HC ICU INTERMEDIATE R&B

## 2019-03-30 PROCEDURE — 74177 CT ABD & PELVIS W/CONTRAST: CPT

## 2019-03-30 PROCEDURE — 93005 ELECTROCARDIOGRAM TRACING: CPT | Performed by: NURSE PRACTITIONER

## 2019-03-30 PROCEDURE — 99284 EMERGENCY DEPT VISIT MOD MDM: CPT

## 2019-03-30 PROCEDURE — 87086 URINE CULTURE/COLONY COUNT: CPT

## 2019-03-30 PROCEDURE — 6360000004 HC RX CONTRAST MEDICATION: Performed by: EMERGENCY MEDICINE

## 2019-03-30 PROCEDURE — 81001 URINALYSIS AUTO W/SCOPE: CPT

## 2019-03-30 RX ORDER — MORPHINE SULFATE 2 MG/ML
2 INJECTION, SOLUTION INTRAMUSCULAR; INTRAVENOUS EVERY 4 HOURS PRN
Status: DISCONTINUED | OUTPATIENT
Start: 2019-03-30 | End: 2019-04-07 | Stop reason: HOSPADM

## 2019-03-30 RX ORDER — SODIUM CHLORIDE 0.9 % (FLUSH) 0.9 %
10 SYRINGE (ML) INJECTION PRN
Status: DISCONTINUED | OUTPATIENT
Start: 2019-03-30 | End: 2019-04-07 | Stop reason: HOSPADM

## 2019-03-30 RX ORDER — MAGNESIUM SULFATE 1 G/100ML
1 INJECTION INTRAVENOUS PRN
Status: DISCONTINUED | OUTPATIENT
Start: 2019-03-30 | End: 2019-04-07 | Stop reason: HOSPADM

## 2019-03-30 RX ORDER — SENNA AND DOCUSATE SODIUM 50; 8.6 MG/1; MG/1
1 TABLET, FILM COATED ORAL 2 TIMES DAILY
Status: DISCONTINUED | OUTPATIENT
Start: 2019-03-30 | End: 2019-04-07 | Stop reason: HOSPADM

## 2019-03-30 RX ORDER — SODIUM CHLORIDE 0.9 % (FLUSH) 0.9 %
10 SYRINGE (ML) INJECTION EVERY 12 HOURS SCHEDULED
Status: DISCONTINUED | OUTPATIENT
Start: 2019-03-30 | End: 2019-04-07 | Stop reason: HOSPADM

## 2019-03-30 RX ORDER — ACETAMINOPHEN 500 MG
1000 TABLET ORAL ONCE
Status: COMPLETED | OUTPATIENT
Start: 2019-03-30 | End: 2019-03-30

## 2019-03-30 RX ORDER — SODIUM CHLORIDE 9 MG/ML
INJECTION, SOLUTION INTRAVENOUS CONTINUOUS
Status: DISCONTINUED | OUTPATIENT
Start: 2019-03-30 | End: 2019-04-05

## 2019-03-30 RX ORDER — ONDANSETRON 2 MG/ML
4 INJECTION INTRAMUSCULAR; INTRAVENOUS EVERY 6 HOURS PRN
Status: DISCONTINUED | OUTPATIENT
Start: 2019-03-30 | End: 2019-04-07 | Stop reason: HOSPADM

## 2019-03-30 RX ORDER — POTASSIUM CHLORIDE 7.45 MG/ML
10 INJECTION INTRAVENOUS PRN
Status: DISCONTINUED | OUTPATIENT
Start: 2019-03-30 | End: 2019-04-07 | Stop reason: HOSPADM

## 2019-03-30 RX ORDER — POTASSIUM CHLORIDE 20 MEQ/1
40 TABLET, EXTENDED RELEASE ORAL PRN
Status: DISCONTINUED | OUTPATIENT
Start: 2019-03-30 | End: 2019-04-07 | Stop reason: HOSPADM

## 2019-03-30 RX ORDER — 0.9 % SODIUM CHLORIDE 0.9 %
30 INTRAVENOUS SOLUTION INTRAVENOUS ONCE
Status: COMPLETED | OUTPATIENT
Start: 2019-03-30 | End: 2019-03-30

## 2019-03-30 RX ORDER — OXYCODONE HYDROCHLORIDE AND ACETAMINOPHEN 5; 325 MG/1; MG/1
1 TABLET ORAL EVERY 4 HOURS PRN
Status: DISCONTINUED | OUTPATIENT
Start: 2019-03-30 | End: 2019-04-07 | Stop reason: HOSPADM

## 2019-03-30 RX ORDER — MELOXICAM 7.5 MG/1
15 TABLET ORAL DAILY
Status: CANCELLED | OUTPATIENT
Start: 2019-03-30

## 2019-03-30 RX ORDER — ACETAMINOPHEN 325 MG/1
650 TABLET ORAL EVERY 4 HOURS PRN
Status: DISCONTINUED | OUTPATIENT
Start: 2019-03-30 | End: 2019-04-07 | Stop reason: HOSPADM

## 2019-03-30 RX ORDER — ATORVASTATIN CALCIUM 10 MG/1
10 TABLET, FILM COATED ORAL NIGHTLY
Status: DISCONTINUED | OUTPATIENT
Start: 2019-03-30 | End: 2019-04-07 | Stop reason: HOSPADM

## 2019-03-30 RX ORDER — ASPIRIN 81 MG/1
81 TABLET, CHEWABLE ORAL DAILY
Status: DISCONTINUED | OUTPATIENT
Start: 2019-03-31 | End: 2019-04-07 | Stop reason: HOSPADM

## 2019-03-30 RX ADMIN — SODIUM CHLORIDE 2448 ML: 9 INJECTION, SOLUTION INTRAVENOUS at 20:14

## 2019-03-30 RX ADMIN — ATORVASTATIN CALCIUM 10 MG: 10 TABLET, FILM COATED ORAL at 23:11

## 2019-03-30 RX ADMIN — TAZOBACTAM SODIUM AND PIPERACILLIN SODIUM 3.38 G: 375; 3 INJECTION, SOLUTION INTRAVENOUS at 20:14

## 2019-03-30 RX ADMIN — SODIUM CHLORIDE: 9 INJECTION, SOLUTION INTRAVENOUS at 22:24

## 2019-03-30 RX ADMIN — ACETAMINOPHEN 1000 MG: 500 TABLET ORAL at 20:09

## 2019-03-30 RX ADMIN — IOPAMIDOL 75 ML: 755 INJECTION, SOLUTION INTRAVENOUS at 18:09

## 2019-03-30 ASSESSMENT — PAIN SCALES - GENERAL
PAINLEVEL_OUTOF10: 7
PAINLEVEL_OUTOF10: 7
PAINLEVEL_OUTOF10: 2

## 2019-03-30 ASSESSMENT — PAIN DESCRIPTION - LOCATION: LOCATION: LEG

## 2019-03-30 ASSESSMENT — PAIN DESCRIPTION - ORIENTATION: ORIENTATION: LEFT;ANTERIOR

## 2019-03-30 ASSESSMENT — PAIN DESCRIPTION - ONSET: ONSET: ON-GOING

## 2019-03-30 ASSESSMENT — PAIN DESCRIPTION - FREQUENCY: FREQUENCY: INTERMITTENT

## 2019-03-30 ASSESSMENT — PAIN DESCRIPTION - PAIN TYPE: TYPE: ACUTE PAIN

## 2019-03-30 ASSESSMENT — PAIN DESCRIPTION - DESCRIPTORS: DESCRIPTORS: ACHING;CONSTANT

## 2019-03-30 NOTE — H&P
[facility]    3/30/2019 7:48 PM    PCP:  Gilford Nao, DO (Tel: [phone] )    Chief complaint:  Left hip pain     History of Present Illness:  Simon Crespo is a 79 y.o. male with a history of CAD, HLD and prostate cancer s/p prostatectomy in January 2019 by Dr. Benito Payne; who presented with several days of increasing severe sharp stabbing left hip pain with radiation down to left thigh. He has also been experiencing fevers with a Tmax of 102 F at home. Denies any chills, lightheadedness, dizziness, chest pain, dyspnea, N/V/D, abdominal pain, dysuria, extremity numbness or tingling. He saw his PCP two days ago for his left hip pain and at that time was sent for a XR and lower extremity venous doppler which was negative any acute findings. He was started on Zanaflex and Mobic with no real improvement in his pain and thus came to ED for further evaluation. History obtained from patient and EMR. REVIEW OF SYSTEMS:   Pertinent positive and negatives per HPI, otherwise all systems reviewed and are negative. Past Medical History:   has a past medical history of CAD (coronary artery disease), Mixed hyperlipidemia, and Prostate cancer ([facility] [address] Past Surgical History:   has a past surgical history that includes Dental surgery; Colonoscopy (1-10-11); Cardiac catheterization (12/28/2015); Coronary artery bypass graft (12.30.2015); and Prostatectomy (N/A, 1/28/2019). Medications Prior to Admission:  Prior to Admission medications    Medication Sig Start Date End Date Taking?  Authorizing Provider   nitrofurantoin, macrocrystal-monohydrate, (MACROBID) 100 MG capsule Take 1 capsule by mouth 2 times daily for 7 days 3/29/19 4/5/19 Yes Gilford Nao, DO   meloxicam [facility]) 15 MG tablet Take 1 tablet by mouth daily 3/28/19  Yes Gilford Nao, DO   tiZANidine (ZANAFLEX) 4 MG tablet Take 1 tablet by mouth 3 times daily for 10 days 3/28/19 4/7/19 Yes Allison John Royce Dickey DO   atorvastatin (LIPITOR) 10 MG tablet Take 1 tablet by mouth nightly 2/12/19  Yes Dank Lim MD   senna-docusate (PERICOLACE) 8.6-50 MG per tablet Take 1 tablet by mouth 2 times daily 1/28/19  Yes Rebecca Diamond MD   aspirin 81 MG tablet Take 1 tablet by mouth daily 11/26/18  Yes Dank Lim MD   tadalafil (CIALIS) 5 MG tablet TAKE 1 TABLET BY MOUTH AS NEEDED FOR ERECTILE DYSFUNCTION 10/12/18  Yes Dank Lim MD       Allergies:  No Known Allergies     Social History:  The patient currently lives at home   Tobacco:  reports that he has never smoked. He has never used smokeless tobacco.  ETOH:  reports that he drinks about 0.6 - 1.2 oz of alcohol per week. Family History: Reviewed in detail and negative for DM, Cancer. Positive as follows:  family history includes Heart Disease in his father; Stroke in his mother. PHYSICAL EXAM:  BP (!) 117/52   Pulse 88   Temp 100.4 °F (38 °C) (Oral)   Resp 15   SpO2 98%   General appearance: No apparent distress appears stated age and cooperative. HEENT Normal cephalic, atraumatic without obvious deformity. Pupils equal, round, and reactive to light. Extra ocular muscles intact. Conjunctivae/corneas clear. Neck: Supple, No jugular venous distention/bruits. Trachea midline without thyromegaly or adenopathy with full range of motion. Lungs: Clear to auscultation, bilaterally without Rales/Wheezes/Rhonchi with good respiratory effort. Heart: Regular rate and rhythm with Normal S1/S2 without murmurs, rubs or gallops, point of maximum impulse non-displaced  Abdomen: Soft, non-tender or non-distended without rigidity or guarding and positive bowel sounds all four quadrants. Extremities: No clubbing, cyanosis, or edema bilaterally. Full range of motion without deformity. Skin: Skin color, texture, turgor normal.  No rashes or lesions.   Neurologic: Alert and oriented X 3, neurovascularly intact with sensory/motor intact upper extremities/lower extremities, bilaterally. Cranial nerves: II-XII intact, grossly non-focal.  Mental status: Alert, oriented, thought content appropriate. Capillary Refill: Acceptable  < 3 seconds  Peripheral Pulses: +3 Easily felt, not easily obliterated with pressure    CT abdomen/pelvis:  I have reviewed the CT with the following interpretation:   1. Large lobular fluid collection along the left pelvic sidewall involving  the distal psoas and iliacus muscles measuring approximately 16.2 x 8.1 x 4.1  cm with mild adjacent stranding.  This may represent an abscess or liquefying  hematoma. 2. Status post prostatectomy. EKG: Note done to date. CBC   Recent Labs     03/29/19  1604 03/30/19  1708   WBC 29.3* 30.2*   HGB 12.6* 13.4*   HCT 38.6* 40.9    302      RENAL  Recent Labs     03/30/19  1708   *   K 3.7   CL 94*   CO2 21   BUN 27*   CREATININE 1.2     LFT'S  Recent Labs     03/30/19  1708   AST 83*   ALT 63*   BILITOT 2.2*   ALKPHOS 249*       Assessment/Plan:   · Left psoas abscess seen on CT. General surgery consulted by ED. Will admit and start on IV abx therapy with ID consulted, IR has been consulted for drainage, additional blood culture to be drawn. General surgery will see patient in am. Pain control as ordered. · Sepsis as evident by fever and leukocytosis in setting of above. Will order STAT lactic acid. Ordered weight based IVF, antibiotic therapy and additional blood culture. ID to see patient. Repeat CBC in am.   · History of prostate cancer, s/p prostatectomy 1/28/19 by Dr. Adrian Coates. Will consult urology. · Hyponatremia. IVF hydration and repeat level in am.   · CAD s/p CABG 2015. Denies chest pain. Given sepsis and overall presentation will check EKG, otherwise continue outpatient management. DVT Prophylaxis: Lovenox  Diet: Diet NPO Effective Now Exceptions are: Ice Chips, Sips with Meds  Code Status: Full code  PT/OT Eval Status: Pending    Admit as Inpatient.  I anticipate hospitalization for greater than two midnights based on the above assessment and plan.      NIHARIKA Lal CNP    3/30/2019 7:48 PM

## 2019-03-30 NOTE — ED PROVIDER NOTES
[address] [facility]  [facility]      Pt Name: Conor Hurtado  MRN: [id number]  [place] 1950  Date ofevaluation: 3/30/2019  Provider: Devora Langston MD    CHIEF COMPLAINT       Chief Complaint   Patient presents with    Hip Pain     prostate removed 1/28th, patient reporting everything was great after surgery, then this week he thinks he must have caught the flu where hes had high fevers, and his left hip has been hurting so severely he hasnt been able to walk. HISTORY OF PRESENT ILLNESS   (Location/Symptom, Timing/Onset,Context/Setting, Quality, Duration, Modifying Factors, Severity)  Note limiting factors. Conor Hurtado is a 79 y.o. male who presents to the emergency department with left hip pain. The patient presents with left hip pain and a fever at home. The patient states his fever was 102° at home. He has had pain over his left hip for the last 3 days. He denies any injury to the area. The patient also states that he had the flu around a week ago. She also had a prostatectomy about a month ago. He describes the pain as severe. He denies any numbness or paresthesias. HPI    NursingNotes were reviewed. REVIEW OF SYSTEMS    (2-9 systems for level 4, 10 or more for level 5)     Review of Systems  Negative for GI  musculoskeletal neurological pulmonary and cardiac complaints and all others reviewed and negative  General Appearance:  Alert, cooperative, no distress, appears stated age. Head:  Normocephalic, without obvious abnormality, atraumatic. Eyes:  conjunctiva/corneas clear, EOM's intact. Sclera anicteric. ENT: Mucous membranes moist.   Neck: Supple, symmetrical, trachea midline, no adenopathy. No jugular venous distention. Lungs:   No Respiratory Distress. Chest Wall:     Heart:  Regular rate rhythm with no murmurs rubs or gallops    Abdomen:   Soft and benign    Extremities:  Full range of motion.  except for his left lower hip. He holds his hip in a flexed position for comfort. He had good pulses. Pulses: Good throughout. Skin:  No rashes or lesions to exposed skin. Neurologic: Alert and oriented X 3. Motor grossly normal.  Speech clear. Except as noted above the remainder of the review of systems was reviewed and negative. PAST MEDICAL HISTORY     Past Medical History:   Diagnosis Date    CAD (coronary artery disease)     Mixed hyperlipidemia 11/26/2018    Prostate cancer ([facility] [address] 1/21/2019         SURGICALHISTORY       Past Surgical History:   Procedure Laterality Date    CARDIAC CATHETERIZATION  12/28/2015    Dr Vicente Riddle    COLONOSCOPY  1-10-11    CORONARY ARTERY BYPASS GRAFT  12.30.2015    [facility]    DENTAL SURGERY      PROSTATECTOMY N/A 1/28/2019    ROBOTIC LAPAROSCOPIC RADICAL PROSTATECTOMY WITH BILATERAL LYMPH NODE DISSECTION AND RIGHT SIDE NERVE SPARING performed by Ben Fuentes MD at [address] [facility]       Previous Medications    ASPIRIN 81 MG TABLET    Take 1 tablet by mouth daily    ATORVASTATIN (LIPITOR) 10 MG TABLET    Take 1 tablet by mouth nightly    MELOXICAM (MOBIC) 15 MG TABLET    Take 1 tablet by mouth daily    NITROFURANTOIN, MACROCRYSTAL-MONOHYDRATE, (MACROBID) 100 MG CAPSULE    Take 1 capsule by mouth 2 times daily for 7 days    SENNA-DOCUSATE (PERICOLACE) 8.6-50 MG PER TABLET    Take 1 tablet by mouth 2 times daily    TADALAFIL (CIALIS) 5 MG TABLET    TAKE 1 TABLET BY MOUTH AS NEEDED FOR ERECTILE DYSFUNCTION    TIZANIDINE (ZANAFLEX) 4 MG TABLET    Take 1 tablet by mouth 3 times daily for 10 days       ALLERGIES     Patient has no known allergies.     FAMILY HISTORY       Family History   Problem Relation Age of Onset    Heart Disease Father     Stroke Mother           SOCIAL HISTORY       Social History     Socioeconomic History    Marital status:      Spouse name: None    Number of children: None    Years of education: None    Highest adjacent stranding. This may represent an abscess or liquefying   hematoma. 2. Status post prostatectomy.          IR ABSCESS DRAINAGE PERC    (Results Pending)         ED BEDSIDE ULTRASOUND:   Performed by ED Physician - none    LABS:  Labs Reviewed   CBC WITH AUTO DIFFERENTIAL - Abnormal; Notable for the following components:       Result Value    WBC 30.2 (*)     Hemoglobin 13.4 (*)     Neutrophils # 27.8 (*)     Lymphocytes # 0.9 (*)     Bands Relative 14 (*)     Myelocytes Relative 2 (*)     Toxic Granulation Present (*)     Polychromasia Occasional (*)     All other components within normal limits    Narrative:     Performed at:  [facility] [address], [place], [email or web address]   Phone [phone]   COMPREHENSIVE METABOLIC PANEL W/ REFLEX TO MG FOR LOW K - Abnormal; Notable for the following components:    Sodium 128 (*)     Chloride 94 (*)     Glucose 126 (*)     BUN 27 (*)     Alb 2.3 (*)     Albumin/Globulin Ratio 0.5 (*)     Total Bilirubin 2.2 (*)     Alkaline Phosphatase 249 (*)     ALT 63 (*)     AST 83 (*)     All other components within normal limits    Narrative:     Performed at:  [facility] [address], [place], [facility] [email or web address]   Phone [phone]   SEDIMENTATION RATE - Abnormal; Notable for the following components:    Sed Rate 75 (*)     All other components within normal limits    Narrative:     Performed at:  [facility] [address], [place], [email or web address]   Phone [phone]   URINE RT REFLEX TO CULTURE - Abnormal; Notable for the following components:    Clarity, UA CLOUDY (*)     Bilirubin Urine MODERATE (*)     Ketones, Urine TRACE (*)     Blood, Urine MODERATE (*)     Protein,  (*)     Urobilinogen, Urine 4.0 (*)     Leukocyte Esterase, Urine SMALL (*)     All other components within normal limits    Narrative:     Performed at:  [facility]  [address], [address]   Phone [phone]   MICROSCOPIC URINALYSIS - Abnormal; Notable for the following components:    Bacteria, UA 1+ (*)     All other components within normal limits    Narrative:     Performed at:  [facility]  [address], [address]   Phone [phone]   URINE CULTURE   CULTURE BLOOD #1   BASIC METABOLIC PANEL   CBC WITH AUTO DIFFERENTIAL   HEPATIC FUNCTION PANEL   PROTIME-INR   LACTIC ACID, PLASMA   MAGNESIUM   PHOSPHORUS   PREALBUMIN   APTT   TRANSFERRIN   LACTIC ACID, PLASMA       All other labs were within normal range or not returned as of this dictation. EMERGENCY DEPARTMENT COURSE and DIFFERENTIAL DIAGNOSIS/MDM:   Vitals:    Vitals:    03/30/19 1752 03/30/19 1800 03/30/19 1842 03/30/19 1938   BP: 118/69 120/65 130/68 (!) 117/52   Pulse:   83 88   Resp:   15    Temp:   99.9 °F (37.7 °C) 100.4 °F (38 °C)   TempSrc:    Oral   SpO2: 95% 93% 98% 98%       The patient has remained stable throughout his hospital course. His workup reveals a significant leukocytosis. A CT of his abdomen and pelvis reveals what appears to be a large so as abscess on the left. I consult to general surgery who is very helpful and is currently consulting on the case and ordering antibiotics as well as obtaining the appropriate consultation. I have contacted our hospitalist who will be admitting the patient surgery consulting. He was admitted in stable condition. MDM      REASSESSMENT              CONSULTS:  IP CONSULT TO HOSPITALIST  IP CONSULT TO GENERAL SURGERY  IP CONSULT TO INFECTIOUS DISEASES    PROCEDURES:  Unless otherwise noted below, none     Procedures    FINAL IMPRESSION      1. Psoas abscess [facility])          DISPOSITION/PLAN   DISPOSITION Decision To Admit 03/30/2019 07:40:26 PM      PATIENT REFERREDTO:  No follow-up provider specified.     DISCHARGEMEDICATIONS:  New Prescriptions    No medications on file

## 2019-03-31 ENCOUNTER — APPOINTMENT (OUTPATIENT)
Dept: CT IMAGING | Age: 69
DRG: 871 | End: 2019-03-31
Payer: COMMERCIAL

## 2019-03-31 PROBLEM — R50.9 FEVER: Status: ACTIVE | Noted: 2019-03-31

## 2019-03-31 PROBLEM — D72.829 LEUKOCYTOSIS: Status: ACTIVE | Noted: 2019-03-31

## 2019-03-31 LAB
ALBUMIN SERPL-MCNC: 2.2 G/DL (ref 3.4–5)
ALP BLD-CCNC: 230 U/L (ref 40–129)
ALT SERPL-CCNC: 48 U/L (ref 10–40)
ANION GAP SERPL CALCULATED.3IONS-SCNC: 12 MMOL/L (ref 3–16)
APTT: 25.6 SEC (ref 26–36)
AST SERPL-CCNC: 50 U/L (ref 15–37)
BANDED NEUTROPHILS RELATIVE PERCENT: 10 % (ref 0–7)
BASOPHILS ABSOLUTE: 0 K/UL (ref 0–0.2)
BASOPHILS RELATIVE PERCENT: 0 %
BILIRUB SERPL-MCNC: 1.9 MG/DL (ref 0–1)
BILIRUBIN DIRECT: 1.4 MG/DL (ref 0–0.3)
BILIRUBIN, INDIRECT: 0.5 MG/DL (ref 0–1)
BUN BLDV-MCNC: 26 MG/DL (ref 7–20)
CALCIUM SERPL-MCNC: 8.1 MG/DL (ref 8.3–10.6)
CHLORIDE BLD-SCNC: 103 MMOL/L (ref 99–110)
CO2: 19 MMOL/L (ref 21–32)
CREAT SERPL-MCNC: 1 MG/DL (ref 0.8–1.3)
DOHLE BODIES: PRESENT
EKG ATRIAL RATE: 86 BPM
EKG DIAGNOSIS: NORMAL
EKG P AXIS: 44 DEGREES
EKG P-R INTERVAL: 150 MS
EKG Q-T INTERVAL: 366 MS
EKG QRS DURATION: 96 MS
EKG QTC CALCULATION (BAZETT): 437 MS
EKG R AXIS: -15 DEGREES
EKG T AXIS: 68 DEGREES
EKG VENTRICULAR RATE: 86 BPM
EOSINOPHILS ABSOLUTE: 0 K/UL (ref 0–0.6)
EOSINOPHILS RELATIVE PERCENT: 0 %
GFR AFRICAN AMERICAN: >60
GFR NON-AFRICAN AMERICAN: >60
GLUCOSE BLD-MCNC: 104 MG/DL (ref 70–99)
HCT VFR BLD CALC: 35.4 % (ref 40.5–52.5)
HEMOGLOBIN: 11.8 G/DL (ref 13.5–17.5)
INR BLD: 1.25 (ref 0.86–1.14)
LACTIC ACID: 1.1 MMOL/L (ref 0.4–2)
LYMPHOCYTES ABSOLUTE: 1.1 K/UL (ref 1–5.1)
LYMPHOCYTES RELATIVE PERCENT: 4 %
MAGNESIUM: 2 MG/DL (ref 1.8–2.4)
MCH RBC QN AUTO: 28.1 PG (ref 26–34)
MCHC RBC AUTO-ENTMCNC: 33.4 G/DL (ref 31–36)
MCV RBC AUTO: 84.3 FL (ref 80–100)
METAMYELOCYTES RELATIVE PERCENT: 2 %
MONOCYTES ABSOLUTE: 1.7 K/UL (ref 0–1.3)
MONOCYTES RELATIVE PERCENT: 6 %
NEUTROPHILS ABSOLUTE: 25.6 K/UL (ref 1.7–7.7)
NEUTROPHILS RELATIVE PERCENT: 78 %
PDW BLD-RTO: 14.1 % (ref 12.4–15.4)
PHOSPHORUS: 2.4 MG/DL (ref 2.5–4.9)
PLATELET # BLD: 285 K/UL (ref 135–450)
PLATELET SLIDE REVIEW: ADEQUATE
PMV BLD AUTO: 8.2 FL (ref 5–10.5)
POTASSIUM SERPL-SCNC: 3.8 MMOL/L (ref 3.5–5.1)
PREALBUMIN: <3 MG/DL (ref 20–40)
PROTHROMBIN TIME: 14.3 SEC (ref 9.8–13)
RBC # BLD: 4.2 M/UL (ref 4.2–5.9)
RBC # BLD: NORMAL 10*6/UL
SLIDE REVIEW: ABNORMAL
SODIUM BLD-SCNC: 134 MMOL/L (ref 136–145)
TOTAL PROTEIN: 5.6 G/DL (ref 6.4–8.2)
TOXIC GRANULATION: PRESENT
URINE CULTURE, ROUTINE: NORMAL
WBC # BLD: 28.4 K/UL (ref 4–11)

## 2019-03-31 PROCEDURE — 99222 1ST HOSP IP/OBS MODERATE 55: CPT | Performed by: SURGERY

## 2019-03-31 PROCEDURE — 87077 CULTURE AEROBIC IDENTIFY: CPT

## 2019-03-31 PROCEDURE — 84466 ASSAY OF TRANSFERRIN: CPT

## 2019-03-31 PROCEDURE — 75989 ABSCESS DRAINAGE UNDER X-RAY: CPT

## 2019-03-31 PROCEDURE — 84100 ASSAY OF PHOSPHORUS: CPT

## 2019-03-31 PROCEDURE — 99255 IP/OBS CONSLTJ NEW/EST HI 80: CPT | Performed by: INTERNAL MEDICINE

## 2019-03-31 PROCEDURE — 2060000000 HC ICU INTERMEDIATE R&B

## 2019-03-31 PROCEDURE — 85025 COMPLETE CBC W/AUTO DIFF WBC: CPT

## 2019-03-31 PROCEDURE — 87075 CULTR BACTERIA EXCEPT BLOOD: CPT

## 2019-03-31 PROCEDURE — 6360000002 HC RX W HCPCS: Performed by: RADIOLOGY

## 2019-03-31 PROCEDURE — 83735 ASSAY OF MAGNESIUM: CPT

## 2019-03-31 PROCEDURE — 85610 PROTHROMBIN TIME: CPT

## 2019-03-31 PROCEDURE — 80076 HEPATIC FUNCTION PANEL: CPT

## 2019-03-31 PROCEDURE — 84134 ASSAY OF PREALBUMIN: CPT

## 2019-03-31 PROCEDURE — 80048 BASIC METABOLIC PNL TOTAL CA: CPT

## 2019-03-31 PROCEDURE — 87070 CULTURE OTHR SPECIMN AEROBIC: CPT

## 2019-03-31 PROCEDURE — 0W9J30Z DRAINAGE OF PELVIC CAVITY WITH DRAINAGE DEVICE, PERCUTANEOUS APPROACH: ICD-10-PCS | Performed by: RADIOLOGY

## 2019-03-31 PROCEDURE — 2500000003 HC RX 250 WO HCPCS: Performed by: NURSE PRACTITIONER

## 2019-03-31 PROCEDURE — 36415 COLL VENOUS BLD VENIPUNCTURE: CPT

## 2019-03-31 PROCEDURE — 85730 THROMBOPLASTIN TIME PARTIAL: CPT

## 2019-03-31 PROCEDURE — 51798 US URINE CAPACITY MEASURE: CPT

## 2019-03-31 PROCEDURE — 87205 SMEAR GRAM STAIN: CPT

## 2019-03-31 PROCEDURE — 6370000000 HC RX 637 (ALT 250 FOR IP): Performed by: NURSE PRACTITIONER

## 2019-03-31 PROCEDURE — 87186 SC STD MICRODIL/AGAR DIL: CPT

## 2019-03-31 PROCEDURE — 86403 PARTICLE AGGLUT ANTBDY SCRN: CPT

## 2019-03-31 PROCEDURE — 2580000003 HC RX 258: Performed by: NURSE PRACTITIONER

## 2019-03-31 PROCEDURE — 93010 ELECTROCARDIOGRAM REPORT: CPT | Performed by: INTERNAL MEDICINE

## 2019-03-31 PROCEDURE — 6370000000 HC RX 637 (ALT 250 FOR IP): Performed by: HOSPITALIST

## 2019-03-31 PROCEDURE — 83605 ASSAY OF LACTIC ACID: CPT

## 2019-03-31 RX ORDER — PANTOPRAZOLE SODIUM 40 MG/1
40 TABLET, DELAYED RELEASE ORAL
Status: DISCONTINUED | OUTPATIENT
Start: 2019-03-31 | End: 2019-04-07 | Stop reason: HOSPADM

## 2019-03-31 RX ORDER — BENZONATATE 100 MG/1
100 CAPSULE ORAL 3 TIMES DAILY PRN
Status: DISCONTINUED | OUTPATIENT
Start: 2019-03-31 | End: 2019-04-07 | Stop reason: HOSPADM

## 2019-03-31 RX ORDER — MIDAZOLAM HYDROCHLORIDE 1 MG/ML
INJECTION INTRAMUSCULAR; INTRAVENOUS
Status: COMPLETED | OUTPATIENT
Start: 2019-03-31 | End: 2019-03-31

## 2019-03-31 RX ORDER — GUAIFENESIN/DEXTROMETHORPHAN 100-10MG/5
5 SYRUP ORAL EVERY 4 HOURS PRN
Status: DISCONTINUED | OUTPATIENT
Start: 2019-03-31 | End: 2019-04-07 | Stop reason: HOSPADM

## 2019-03-31 RX ORDER — FENTANYL CITRATE 50 UG/ML
INJECTION, SOLUTION INTRAMUSCULAR; INTRAVENOUS
Status: COMPLETED | OUTPATIENT
Start: 2019-03-31 | End: 2019-03-31

## 2019-03-31 RX ADMIN — ATORVASTATIN CALCIUM 10 MG: 10 TABLET, FILM COATED ORAL at 20:29

## 2019-03-31 RX ADMIN — GUAIFENESIN AND DEXTROMETHORPHAN 5 ML: 100; 10 SYRUP ORAL at 19:08

## 2019-03-31 RX ADMIN — ACETAMINOPHEN 650 MG: 325 TABLET, FILM COATED ORAL at 04:32

## 2019-03-31 RX ADMIN — TAZOBACTAM SODIUM AND PIPERACILLIN SODIUM 3.38 G: 375; 3 INJECTION, SOLUTION INTRAVENOUS at 12:21

## 2019-03-31 RX ADMIN — SODIUM CHLORIDE: 9 INJECTION, SOLUTION INTRAVENOUS at 12:21

## 2019-03-31 RX ADMIN — MIDAZOLAM HYDROCHLORIDE 1 MG: 1 INJECTION INTRAMUSCULAR; INTRAVENOUS at 09:47

## 2019-03-31 RX ADMIN — FENTANYL CITRATE 50 MCG: 50 INJECTION, SOLUTION INTRAMUSCULAR; INTRAVENOUS at 09:47

## 2019-03-31 RX ADMIN — FENTANYL CITRATE 50 MCG: 50 INJECTION, SOLUTION INTRAMUSCULAR; INTRAVENOUS at 09:55

## 2019-03-31 RX ADMIN — Medication 10 ML: at 20:30

## 2019-03-31 RX ADMIN — SENNOSIDES AND DOCUSATE SODIUM 1 TABLET: 8.6; 5 TABLET ORAL at 10:49

## 2019-03-31 RX ADMIN — SODIUM CHLORIDE: 9 INJECTION, SOLUTION INTRAVENOUS at 23:36

## 2019-03-31 RX ADMIN — Medication 10 ML: at 10:49

## 2019-03-31 RX ADMIN — SENNOSIDES AND DOCUSATE SODIUM 1 TABLET: 8.6; 5 TABLET ORAL at 20:30

## 2019-03-31 RX ADMIN — GUAIFENESIN AND DEXTROMETHORPHAN 5 ML: 100; 10 SYRUP ORAL at 13:45

## 2019-03-31 RX ADMIN — ASPIRIN 81 MG 81 MG: 81 TABLET ORAL at 10:49

## 2019-03-31 RX ADMIN — TAZOBACTAM SODIUM AND PIPERACILLIN SODIUM 3.38 G: 375; 3 INJECTION, SOLUTION INTRAVENOUS at 20:30

## 2019-03-31 RX ADMIN — MIDAZOLAM HYDROCHLORIDE 1 MG: 1 INJECTION INTRAMUSCULAR; INTRAVENOUS at 09:55

## 2019-03-31 RX ADMIN — BENZONATATE 100 MG: 100 CAPSULE ORAL at 20:43

## 2019-03-31 RX ADMIN — SODIUM CHLORIDE: 9 INJECTION, SOLUTION INTRAVENOUS at 02:07

## 2019-03-31 RX ADMIN — TAZOBACTAM SODIUM AND PIPERACILLIN SODIUM 3.38 G: 375; 3 INJECTION, SOLUTION INTRAVENOUS at 04:20

## 2019-03-31 RX ADMIN — PANTOPRAZOLE SODIUM 40 MG: 40 TABLET, DELAYED RELEASE ORAL at 13:45

## 2019-03-31 ASSESSMENT — PAIN SCALES - GENERAL
PAINLEVEL_OUTOF10: 0
PAINLEVEL_OUTOF10: 3
PAINLEVEL_OUTOF10: 0
PAINLEVEL_OUTOF10: 0

## 2019-03-31 NOTE — PLAN OF CARE
2044 Patient admitted from emergency department via stretcher on telemetry with wife Terry Ngo at bedside. Transferred to bed per ED tech. 2102 Vital signs obtained. Temp down to 99.6 temporal. See all flowsheets. Orders reviewed and acknowledged. Oriented to room and environment. Questions answered. Pt is a medium fall risk. Pt remains free from falls. Pt encouraged to use call light for needs throughout night; call light is within reach. Bed lock is in lowest position. Will continue to monitor throughout night.

## 2019-03-31 NOTE — CONSULTS
[facility] Consult Note  [facility]    Provider: Kassidy Rahman MD Patient ID:  Admission Date: 3/30/2019 Name: Araseli Solares Date: n/a MRN: [id number]   Patient Location: [id number] : 1950  Attending: Jory Vyas MD Date of Service: 3/31/2019  PCP: Anitra Villegas DO     Diagnoses:  1. Psoas abscess (HCC)      Assessment/Plan:  Psoas abscess vs. Hematoma vs. lymphocele  - CT reveals large fluid collection 16cm along psoas/iliacus muscles with adjacent strading  Leukocytosis  Significant abdominal/hip pain prompting presentation to ED  Patient empties well, clear urine  History of robotic prostatectomy with bilateral pelvic lymph node dissection 2019  - path pT2, [place] 6, neg margins, N0      Recommendations:  - Perc drain per IR (ordered)  - Cx from abscess, cont abx  - Bladder scan after next void to ensure emptying, hold on álvarez catheter  - Urology will follow please call with any questions    All the patients questions were answered in detail. He understands the plan as listed above.     · Review/order of labs  · Review/order of radiology studies  · discussion with referring MD  · Independent review and interpretation of test or study   Total time spent face-to-face with the patient 20min, including greater than 50% of this time in discussion with the patient/family concerning the following:  · Recommended tests  · management options  · risks/benefits of management options  · importance of compliance  · Prognosis  · Risk factor reduction  · Patient/family education                                                                                                                                                      CC:   Chief Complaint   Patient presents with    Hip Pain     prostate removed , patient reporting everything was great after surgery, then this week he thinks he must have caught the flu where hes had high fevers, and his left hip has been hurting Negative for back pain   Neurological: Negative for weakness  Psychiatric: Negative for anxiety  Integumentary: Negative for rashes or adenopathy     Physical Exam:  Vitals:    03/31/19 0758   BP: 117/60   Pulse: 73   Resp: 13   Temp:    SpO2: 95%     Constitutional: ill appearing, otherwise NAD, well-developed, well-nourished. HEENT: MMM. Hearing intact. Neck: no thyroid masses appreciated. Trachea is midline. Neck appears unremarkable   Lymph: no palpable adenopathy in supraclavicular, or axillary lymph nodes  Cardiovascular: Regular rate. No peripheral edema  Respiratory: Respirations are even and non-labored. No audible breath sounds. Genitourinary: inc c/d/i, mild tenderness LLQ, no CVAT  Abdomen: Soft. No distension, tenderness, hernias, masses or guarding. No CVA tenderness. No hernias appreciated. Liver and spleen appear normal  Psychiatric: A + O x 3, normal affect. Insight appears intact. Muskuloskeletal: MOLINA x 4   Skin: Pink, warm and dry. No rashes on face and arms.     Labs:  Lab Results   Component Value Date    WBC 28.4 (H) 03/31/2019    HGB 11.8 (L) 03/31/2019    HCT 35.4 (L) 03/31/2019    MCV 84.3 03/31/2019     03/31/2019     Lab Results   Component Value Date    CREATININE 1.0 03/31/2019    BUN 26 (H) 03/31/2019     (L) 03/31/2019    K 3.8 03/31/2019     03/31/2019    CO2 19 (L) 03/31/2019     Lab Results   Component Value Date    PSA 5.95 (H) 10/22/2018    PSA 5.18 (H) 04/09/2018    PSA 4.02 (H) 03/09/2016        Imaging:   reviewed    Nazario Aguero MD  3/31/2019

## 2019-03-31 NOTE — CONSULTS
Infectious Diseases Inpatient Consult Note    Reason for Consult:   L pelvic / psoas abscess  Requesting Physician:   Dr Jessica nAna  Primary Care Physician:  Halle Etienne DO  History Obtained From:   Pt, EPIC    Admit Date: 3/30/2019  Hospital Day: 2    CHIEF COMPLAINT:       Chief Complaint   Patient presents with    Hip Pain     prostate removed 1/28th, patient reporting everything was great after surgery, then this week he thinks he must have caught the flu where hes had high fevers, and his left hip has been hurting so severely he hasnt been able to walk. HISTORY OF PRESENT ILLNESS:      78 yo man with hx prostate ca (s/p prostatectomy 1/28/2019), CAD, elevated lipids    Pt reports onset fatigue, fever on 3/25. This has persisted and worsened since then. Felt he had 'the flu'. He developed L hip pain on 3/27. L hip pain with lifting his leg. Able to ambulate. No trauma / precipitant. Seen by PCP 3/28, rx flexoril and NSAI with no relief    Presented to [facility] on 3/30 with worsening pain and L hip pain. Admit 3/30 - T 99.2, WBC 30.2. UA small LE, micro 3-5 WBC. BC sent  Abd / pelvic CT with L psoas collection (see report below, images)  Started on zosyn    Today 3/31, Tm 101.4, WBC 28.4.     IR CT guided abscess drain placed, '90 ml cloudy fluid'    Past Medical History:    Past Medical History:   Diagnosis Date    CAD (coronary artery disease)     Mixed hyperlipidemia 11/26/2018    Prostate cancer ([facility] [address] 1/21/2019       Past Surgical History:    Past Surgical History:   Procedure Laterality Date    CARDIAC CATHETERIZATION  12/28/2015    Dr Dannie Finley    COLONOSCOPY  1-10-11    CORONARY [place]ERY [place]PASS [place]RAFT  12.30.2015    [place]    DENTAL SURGERY      PROSTATECTOMY N/A 1/28/2019    ROBOTIC LAPAROSCOPIC RADICAL PROSTATECTOMY WITH BILATERAL LYMPH NODE DISSECTION AND RIGHT SIDE NERVE SPARING performed by Erna Mora MD [address]       Current Medications:     pantoprazole  40 mg Oral Value Date    CREATININE 1.0 03/31/2019    BUN 26 (H) 03/31/2019     (L) 03/31/2019    K 3.8 03/31/2019     03/31/2019    CO2 19 (L) 03/31/2019       Hepatic Function Panel:   Lab Results   Component Value Date    ALKPHOS 230 03/31/2019    ALT 48 03/31/2019    AST 50 03/31/2019    PROT 5.6 03/31/2019    BILITOT 1.9 03/31/2019    BILIDIR 1.4 03/31/2019    IBILI 0.5 03/31/2019    LABALBU 2.2 03/31/2019       Micro:  3/31 L psoas aspirate: GS 3+WBC, 3+GPC  3/31 Urine cult - no growth to date  3/30 [facility] sent    Imaging:   3/30 Abd / pelvic CT:  1. Large lobular fluid collection along the left pelvic sidewall involving  the distal psoas and iliacus muscles measuring approximately 16.2 x 8.1 x 4.1  cm with mild adjacent stranding.  This may represent an abscess or liquefying hematoma. 2. Status post prostatectomy.       IMPRESSION:      Patient Active Problem List   Diagnosis    Chest pain on exertion    Exertional chest pain    ACS (acute coronary syndrome) (HCC)    Abnormal stress test    Coronary artery disease involving native coronary artery of native heart without angina pectoris    S/P CABG x 5    Elevated PSA    Mixed hyperlipidemia    Prostate cancer ([address]    Psoas abscess, left (HCC)       CAD, elevated lipids  Hx prostate ca (s/p prostatectomy 1/28/2019)    L hip pain  L pelvic / iliacus / psoas abscess - s/p perc drainage, with 12Fr catheter placed, purulent fluid in TREVIN  Leukocytosis     RECOMMENDATIONS:    Cont zosyn  Add linezolid  Will f/u on BC, fluid cult (GS with GPC)    Discussed with pt  Nazario Chacon MD

## 2019-03-31 NOTE — PROGRESS NOTES
Pt c/o persistent dry cough. MD notified.  Electronically signed by Gerber Farnsworth RN on 3/31/2019 at 12:56 PM

## 2019-03-31 NOTE — PROGRESS NOTES
[facility] PROGRESS NOTE    3/31/2019 9:28 AM        Name: Aaliyah Arevalo . Admitted: 3/30/2019  Primary Care Provider: Funmi Rahman DO (Tel: [phone])                        Subjective:  . No acute events overnight. Resting well. Pain control. Diet ok. Labs reviewed  Denies any chest pain sob.      Reviewed interval ancillary notes    Current Medications    aspirin chewable tablet 81 mg Daily   atorvastatin (LIPITOR) tablet 10 mg Nightly   sennosides-docusate sodium (SENOKOT-S) 8.6-50 MG tablet 1 tablet BID   morphine (PF) injection 2 mg Q4H PRN   piperacillin-tazobactam (ZOSYN) 3.375 g in dextrose 50 mL IVPB extended infusion (premix) Q8H   0.9 % sodium chloride infusion Continuous   sodium chloride flush 0.9 % injection 10 mL 2 times per day   sodium chloride flush 0.9 % injection 10 mL PRN   ondansetron (ZOFRAN) injection 4 mg Q6H PRN   enoxaparin (LOVENOX) injection 40 mg Daily   potassium chloride (KLOR-CON M) extended release tablet 40 mEq PRN   Or    potassium bicarb-citric acid (EFFER-K) effervescent tablet 40 mEq PRN   Or    potassium chloride 10 mEq/100 mL IVPB (Peripheral Line) PRN   magnesium sulfate 1 g in dextrose 5% 100 mL IVPB PRN   acetaminophen (TYLENOL) tablet 650 mg Q4H PRN   oxyCODONE-acetaminophen (PERCOCET) 5-325 MG per tablet 1 tablet Q4H PRN     lidocaine 1 % (PF) injection 0.1-0.5 mL Once       Objective:  /60   Pulse 73   Temp 99.2 °F (37.3 °C) (Temporal)   Resp 13   Ht 5' 8\" (1.727 m)   Wt 181 lb 4.8 oz (82.2 kg)   SpO2 95%   BMI 27.57 kg/m²     Intake/Output Summary (Last 24 hours) at 3/31/2019 0928  Last data filed at 3/31/2019 0802  Gross per 24 hour   Intake 3410 ml   Output 275 ml   Net 3135 ml    Wt Readings from Last 3 Encounters:   03/30/19 181 lb 4.8 oz (82.2 kg)   03/28/19 180 lb (81.6 kg)   01/28/19 184 lb 8 oz

## 2019-03-31 NOTE — PRE SEDATION
Sedation Pre-Procedure Note    Patient Name: Lujean Habermann   YOB: 1950  Room/Bed: [facility]-3367/3367-01  Medical Record Number: [id number]  Date: 3/31/2019   Time: 9:50 AM       Indication:  Left psoas abscess    Consent: I have discussed with the patient and/or the patient representative the indication, alternatives, and the possible risks and/or complications of the planned procedure and the anesthesia methods. The patient and/or patient representative appear to understand and agree to proceed. Vital Signs:   Vitals:    03/31/19 0946   BP: (!) 142/89   Pulse: 76   Resp:    Temp:    SpO2: 98%       Past Medical History:   has a past medical history of CAD (coronary artery disease), Mixed hyperlipidemia, and Prostate cancer ([facility] [address] Past Surgical History:   has a past surgical history that includes Dental surgery; Colonoscopy (1-10-11); Cardiac catheterization (12/28/2015); Coronary artery bypass graft (12.30.2015); and Prostatectomy (N/A, 1/28/2019). Medications:   Scheduled Meds:    aspirin  81 mg Oral Daily    atorvastatin  10 mg Oral Nightly    sennosides-docusate sodium  1 tablet Oral BID    piperacillin-tazobactam  3.375 g Intravenous Q8H    sodium chloride flush  10 mL Intravenous 2 times per day    enoxaparin  40 mg Subcutaneous Daily     Continuous Infusions:    sodium chloride 100 mL/hr at 03/31/19 0207     PRN Meds: morphine, sodium chloride flush, ondansetron, potassium chloride **OR** potassium alternative oral replacement **OR** potassium chloride, magnesium sulfate, acetaminophen, oxyCODONE-acetaminophen  Home Meds:   Prior to Admission medications    Medication Sig Start Date End Date Taking?  Authorizing Provider   NONFORMULARY Coenzyme Q10 capsules, 1 capsule daily   Yes Historical Provider, MD   Sildenafil Citrate (VIAGRA PO) Take 2 tablets by mouth daily   Yes Historical Provider, MD   meloxicam (MOBIC) 15 MG tablet Take 1 tablet by mouth daily 3/28/19  Yes Parry Holstein Hamilton Ríos DO   tiZANidine (ZANAFLEX) 4 MG tablet Take 1 tablet by mouth 3 times daily for 10 days 3/28/19 4/7/19 Yes Kamron Bowie DO   atorvastatin (LIPITOR) 10 MG tablet Take 1 tablet by mouth nightly 2/12/19  Yes Richi Amanda MD   aspirin 81 MG tablet Take 1 tablet by mouth daily 11/26/18  Yes Richi Amanda MD   senna-docusate (Kathlyne Ege) 8.6-50 MG per tablet Take 1 tablet by mouth 2 times daily 1/28/19   Susan Ramsey MD     Coumadin Use Last 7 Days:  no  Antiplatelet drug therapy use last 7 days: no  Other anticoagulant use last 7 days: no  Additional Medication Information:        Pre-Sedation Documentation and Exam:   I have reviewed the patient's history and review of systems.     Mallampati Airway Assessment:  normal neck range of motion    Prior History of Anesthesia Complications:   none    ASA Classification:  Class 2 - A normal healthy patient with mild systemic disease    Sedation/ Anesthesia Plan:   intravenous sedation    Medications Planned:   midazolam (Versed) intravenously and fentanyl intravenously    Patient is an appropriate candidate for plan of sedation: yes    Electronically signed by Ta Damico MD on 3/31/2019 at 9:50 AM

## 2019-03-31 NOTE — CONSULTS
Relationships    Social connections:     Talks on phone: None     Gets together: None     Attends Gnosticism service: None     Active member of club or organization: None     Attends meetings of clubs or organizations: None     Relationship status: None    Intimate partner violence:     Fear of current or ex partner: None     Emotionally abused: None     Physically abused: None     Forced sexual activity: None   Other Topics Concern    None   Social History Narrative    None       Family History:    Family History   Problem Relation Age of Onset    Heart Disease Father     Stroke Mother     Other Sister        Review of Systems:  CONSTITUTIONAL:  Negative except as above  HEENT:  negative  RESPIRATORY:  negative  CARDIOVASCULAR:  negative  GASTROINTESTINAL:  negative except as above   GENITOURINARY:  negative  HEMATOLOGIC/LYMPHATIC:  negative  NEUROLOGICAL:  Negative      Vital Signs:  Patient Vitals for the past 24 hrs:   BP Temp Temp src Pulse Resp SpO2 Height Weight   19 0758 117/60 -- -- 73 13 95 % -- --   19 0600 -- 99.2 °F (37.3 °C) Temporal -- -- -- -- --   19 0425 (!) 146/74 101.4 °F (38.6 °C) Temporal 85 12 96 % -- --   19 0403 -- -- -- 83 -- -- -- --   19 0047 126/70 99.4 °F (37.4 °C) Temporal 69 -- 96 % -- --   19 2102 124/66 99.6 °F (37.6 °C) Temporal 78 18 96 % 5' 8\" (1.727 m) 181 lb 4.8 oz (82.2 kg)   19 128/73 -- -- 82 18 95 % -- --   19 -- -- -- -- -- -- -- 179 lb 14.3 oz (81.6 kg)   19 193 (!) 117/52 101.4 °F (38.6 °C) Oral 88 -- 93 % -- --   19 1842 130/68 99.9 °F (37.7 °C) -- 83 15 98 % -- --   19 1800 120/65 -- -- -- -- 93 % -- --   19 1752 118/69 -- -- -- -- 95 % -- --   19 1734 -- -- -- -- -- 96 % -- --   19 1630 129/85 99.2 °F (37.3 °C) Infrared 88 18 91 % -- --      TEMPERATURE HISTORY 24H: Temp (24hrs), Av °F (37.8 °C), Min:99.2 °F (37.3 °C), Max:101.4 °F (38.6 °C)    BLOOD PRESSURE HISTORY: Systolic (25OHI), NSP:961 , Min:117 , XOL:633    Diastolic (31YRQ), VGP:52, Min:52, Max:85    Admission Weight: 179 lb 14.3 oz (81.6 kg)       Intake/Output Summary (Last 24 hours) at 3/31/2019 0846  Last data filed at 3/31/2019 0802  Gross per 24 hour   Intake 3410 ml   Output 275 ml   Net 3135 ml     Drain/tube Output:         Physical Exam:  CONSTITUTIONAL:  alert, no apparent distress   NEUROLOGIC:  Mental Status Exam:  Level of Alertness:   awake  Orientation:  Oriented to person, place, time  EYES:  sclera clear  HENT:  normocepalic, without obvious abnormality  NECK:  supple, symmetrical, trachea midline  LUNGS:  clear to auscultation  CARDIOVASCULAR:  regular rate and rhythm   ABDOMEN: soft, non-distended, moderate left lower quadrant tenderness without peritonitis  SKIN:  no bruising or bleeding, normal skin color, texture, turgor and no redness, warmth, or swelling      Labs:    CBC:    Recent Labs     03/29/19  1604 03/30/19  1708 03/31/19 0524   WBC 29.3* 30.2* 28.4*   HGB 12.6* 13.4* 11.8*   HCT 38.6* 40.9 35.4*    302 285     BMP:    Recent Labs     03/30/19  1708 03/31/19 0524   * 134*   K 3.7 3.8   CL 94* 103   CO2 21 19*   BUN 27* 26*   CREATININE 1.2 1.0   GLUCOSE 126* 104*     Hepatic:   Recent Labs     03/30/19  1708 03/31/19 0524   AST 83* 50*   ALT 63* 48*   BILITOT 2.2* 1.9*   ALKPHOS 249* 230*     Amylase: No results for input(s): AMYLASE in the last 72 hours. Lipase: No results for input(s): LIPASE in the last 72 hours. Mag:      Recent Labs     03/31/19 0524   MG 2.00      Phos:     Recent Labs     03/31/19 0524   PHOS 2.4*      Coags:   Recent Labs     03/31/19 0524   INR 1.25*   APTT 25.6*       Imaging:  I have personally reviewed the following films:    Xr Hip Left (2-3 Views)    Result Date: 3/28/2019  EXAMINATION: 2 XRAY VIEWS OF THE LEFT HIP 3/28/2019 12:16 pm COMPARISON: None.  HISTORY: ORDERING SYSTEM PROVIDED HISTORY: Left hip pain TECHNOLOGIST PROVIDED HISTORY: Reason for exam:->pain Ordering Physician Provided Reason for Exam: No known injury. Pain in crease of left leg. started 2 days ago. FINDINGS: The left hip is intact. No fracture or dislocation. There is mild degenerative change in both hip joints with sclerotic change to the acetabulum and mild osteophyte formation. Pelvic bones intact. Vascular phleboliths are present throughout the pelvis. No other significant soft tissue findings. No acute finding in the left hip. Ct Abdomen Pelvis W Iv Contrast Additional Contrast? None    Result Date: 3/30/2019  EXAMINATION: CT OF THE ABDOMEN AND PELVIS WITH CONTRAST 3/30/2019 6:09 pm TECHNIQUE: CT of the abdomen and pelvis was performed with the administration of intravenous contrast. Multiplanar reformatted images are provided for review. Dose modulation, iterative reconstruction, and/or weight based adjustment of the mA/kV was utilized to reduce the radiation dose to as low as reasonably achievable. COMPARISON: None. HISTORY: ORDERING SYSTEM PROVIDED HISTORY: left hip pain. ..include bone window please TECHNOLOGIST PROVIDED HISTORY: Additional Contrast?->None Ordering Physician Provided Reason for Exam: Hip Pain (prostate removed 1/28th, patient reporting everything was great after surgery, then this week he thinks he must have caught the flu where hes had high fevers, and his left hip has been hurting so severely he hasnt been able to walk. ) Acuity: Acute Type of Exam: Initial FINDINGS: Lower Chest: Coronary artery atherosclerotic vascular calcifications. Mild bibasilar atelectasis. Trace left pleural effusion. Organs: 9 mm hypodensity in the left hepatic lobe on axial image 51 too small to definitively characterize but likely to represent a benign cyst or hemangioma. The gallbladder is normal in appearance. No biliary ductal dilatation. The pancreas, spleen, and bilateral adrenal glands are unremarkable.   Small bilateral renal hypodensities measuring less than 1 cm too small to definitively characterize but likely to represent benign cysts. No obstructive uropathy. GI/Bowel: Normal appendix. Sigmoid colon diverticulosis. No bowel obstruction or abnormal bowel wall thickening. Pelvis: The urinary bladder is unremarkable. The prostate gland is not clearly visualized possibly status post prostatectomy. There is a large lobular fluid collection along the left pelvic sidewall partially involving the distal psoas and iliacus muscles measuring approximately 16.2 x 8.1 x 4.1 cm on sagittal image 106 and axial image 161. Adjacent fat stranding is noted. No pelvic or inguinal lymphadenopathy. No free intraperitoneal fluid in the pelvis. Peritoneum/Retroperitoneum: The abdominal aorta is normal in caliber with mild atherosclerotic vascular disease. No retroperitoneal or mesenteric lymphadenopathy is identified. No free air or fluid is seen in the abdomen. Bones/Soft Tissues: Status post bilateral vasectomy. Postoperative scarring in the anterior abdominal wall. Mild subcutaneous fat stranding in the left anterolateral abdominal wall. Status post median sternotomy. No acute osseous abnormality. 1. Large lobular fluid collection along the left pelvic sidewall involving the distal psoas and iliacus muscles measuring approximately 16.2 x 8.1 x 4.1 cm with mild adjacent stranding. This may represent an abscess or liquefying hematoma. 2. Status post prostatectomy. Us Dup Lower Extremity Left Kiran    Result Date: 3/29/2019  EXAMINATION: DUPLEX VENOUS ULTRASOUND OF THE LEFT LOWER EXTREMITY 3/29/2019 2:46 pm TECHNIQUE: Duplex ultrasound and Doppler images were obtained of the left lower extremity. COMPARISON: None.  HISTORY: ORDERING SYSTEM PROVIDED HISTORY: Left hip pain TECHNOLOGIST PROVIDED HISTORY: Ordering Physician Provided Reason for Exam: left hip, groin pain, recent prostate surgery 2/2019 FINDINGS: The visualized veins of the left lower extremity are patent and free of echogenic thrombus. The veins are normally compressible and have normal phasic flow. No evidence of DVT in the left lower extremity. Cultures:  Relevant cultures documented under results section     Assessment:  Patient Active Problem List   Diagnosis    Chest pain on exertion    Exertional chest pain    ACS (acute coronary syndrome) (HCC)    Abnormal stress test    Coronary artery disease involving native coronary artery of native heart without angina pectoris    S/P CABG x 5    Elevated PSA    Mixed hyperlipidemia    Prostate cancer ([address]    Psoas abscess, left ([address]     Left psoas abscess  Recent robot prostatectomy  CAD, s/p CABG    Plan:  1. Discussed with interventional radiology, left psoas abscess is amenable to percutaneous drainage and scheduled later this morning. Will check culture, ideally leave drain, and re-image in several days pending symptom response and drain output  2. NPO for procedure, can advance from surgical standpoint after  3. IVF  4. Antibiotics, infectious disease consulted  5. Pain medication as needed  6. Urology consulted as patient has recently had prostatectomy  7. No indication for surgical exploration at this time  8. The patient is not currently smoking. Recommend maintaining a smoke-free lifestyle. 9. DVT prophylaxis with lovenox (being held this morning for IR procedure) and SCD's    This plan was discussed at length with the patient. He was understanding and in agreement with the plan  Thank you for the consult and allowing me to participate in the care of this patient. I look forward to following him this admission    Douglas B. Nellene Litten MD, FACS  3/31/2019  8:46 AM

## 2019-03-31 NOTE — PROGRESS NOTES
Bladder scan done after voided. Post void residual 0 mL.  Electronically signed by Gerber Farnsworth RN on 3/31/2019 at 12:35 PM

## 2019-03-31 NOTE — ED NOTES
Pt report given to ALECIA Kaufman, IV NS and zosyn infusing upon transport. Pt can be seen NSR HR 84 via  on 3T.       Lynn Louie RN  03/30/19 2025

## 2019-03-31 NOTE — PLAN OF CARE
Problem: Pain:  Goal: Pain level will decrease  Description  Pain level will decrease  Outcome: Met This Shift  Note:   Pt has not c/o any pain. Problem: Falls - Risk of:  Goal: Will remain free from falls  Description  Will remain free from falls  3/31/2019 1735 by Derick Tejada RN  Note:   Has remained free of falls. Calls when gets up to go to BR. Uses urinal at bedside. Problem: Respiratory  Goal: No pulmonary complications  Note:   Has persistent dry cough. MD aware. Getting PRN cough medicine. Problem: Musculor/Skeletal Functional Status  Goal: Highest potential functional level  Note:   Left leg still weak from psoas abscess.

## 2019-04-01 LAB
ANION GAP SERPL CALCULATED.3IONS-SCNC: 9 MMOL/L (ref 3–16)
ANISOCYTOSIS: ABNORMAL
BANDED NEUTROPHILS RELATIVE PERCENT: 6 % (ref 0–7)
BASOPHILS ABSOLUTE: 0.3 K/UL (ref 0–0.2)
BASOPHILS RELATIVE PERCENT: 1 %
BUN BLDV-MCNC: 18 MG/DL (ref 7–20)
C-REACTIVE PROTEIN: 206.7 MG/L (ref 0–5.1)
CALCIUM SERPL-MCNC: 8.2 MG/DL (ref 8.3–10.6)
CHLORIDE BLD-SCNC: 105 MMOL/L (ref 99–110)
CO2: 21 MMOL/L (ref 21–32)
CREAT SERPL-MCNC: 0.8 MG/DL (ref 0.8–1.3)
DOHLE BODIES: PRESENT
EOSINOPHILS ABSOLUTE: 0.3 K/UL (ref 0–0.6)
EOSINOPHILS RELATIVE PERCENT: 1 %
GFR AFRICAN AMERICAN: >60
GFR NON-AFRICAN AMERICAN: >60
GLUCOSE BLD-MCNC: 108 MG/DL (ref 70–99)
HCT VFR BLD CALC: 33.3 % (ref 40.5–52.5)
HCT VFR BLD CALC: 33.3 % (ref 40.5–52.5)
HEMOGLOBIN: 11.1 G/DL (ref 13.5–17.5)
HEMOGLOBIN: 11.2 G/DL (ref 13.5–17.5)
LYMPHOCYTES ABSOLUTE: 3.8 K/UL (ref 1–5.1)
LYMPHOCYTES RELATIVE PERCENT: 14 %
MAGNESIUM: 2.1 MG/DL (ref 1.8–2.4)
MCH RBC QN AUTO: 28.4 PG (ref 26–34)
MCHC RBC AUTO-ENTMCNC: 33.5 G/DL (ref 31–36)
MCV RBC AUTO: 84.7 FL (ref 80–100)
MONOCYTES ABSOLUTE: 1.6 K/UL (ref 0–1.3)
MONOCYTES RELATIVE PERCENT: 6 %
NEUTROPHILS ABSOLUTE: 21.1 K/UL (ref 1.7–7.7)
NEUTROPHILS RELATIVE PERCENT: 72 %
ORGANISM: ABNORMAL
PDW BLD-RTO: 14.1 % (ref 12.4–15.4)
PHOSPHORUS: 2.7 MG/DL (ref 2.5–4.9)
PLATELET # BLD: 322 K/UL (ref 135–450)
PLATELET SLIDE REVIEW: ADEQUATE
PMV BLD AUTO: 7.8 FL (ref 5–10.5)
POLYCHROMASIA: ABNORMAL
POTASSIUM SERPL-SCNC: 3.9 MMOL/L (ref 3.5–5.1)
RBC # BLD: 3.93 M/UL (ref 4.2–5.9)
SEDIMENTATION RATE, ERYTHROCYTE: 55 MM/HR (ref 0–20)
SLIDE REVIEW: ABNORMAL
SODIUM BLD-SCNC: 135 MMOL/L (ref 136–145)
TOXIC GRANULATION: PRESENT
TRANSFERRIN: 95 MG/DL (ref 200–360)
URINE CULTURE, ROUTINE: ABNORMAL
URINE CULTURE, ROUTINE: ABNORMAL
WBC # BLD: 27 K/UL (ref 4–11)

## 2019-04-01 PROCEDURE — APPNB30 APP NON BILLABLE TIME 0-30 MINS: Performed by: NURSE PRACTITIONER

## 2019-04-01 PROCEDURE — 99232 SBSQ HOSP IP/OBS MODERATE 35: CPT | Performed by: SURGERY

## 2019-04-01 PROCEDURE — 2580000003 HC RX 258: Performed by: NURSE PRACTITIONER

## 2019-04-01 PROCEDURE — 97530 THERAPEUTIC ACTIVITIES: CPT

## 2019-04-01 PROCEDURE — 97162 PT EVAL MOD COMPLEX 30 MIN: CPT

## 2019-04-01 PROCEDURE — 6360000002 HC RX W HCPCS: Performed by: INTERNAL MEDICINE

## 2019-04-01 PROCEDURE — 85018 HEMOGLOBIN: CPT

## 2019-04-01 PROCEDURE — 6370000000 HC RX 637 (ALT 250 FOR IP): Performed by: HOSPITALIST

## 2019-04-01 PROCEDURE — APPSS15 APP SPLIT SHARED TIME 0-15 MINUTES: Performed by: NURSE PRACTITIONER

## 2019-04-01 PROCEDURE — 6360000002 HC RX W HCPCS: Performed by: NURSE PRACTITIONER

## 2019-04-01 PROCEDURE — 6360000002 HC RX W HCPCS: Performed by: HOSPITALIST

## 2019-04-01 PROCEDURE — 2060000000 HC ICU INTERMEDIATE R&B

## 2019-04-01 PROCEDURE — 97165 OT EVAL LOW COMPLEX 30 MIN: CPT

## 2019-04-01 PROCEDURE — 2580000003 HC RX 258: Performed by: HOSPITALIST

## 2019-04-01 PROCEDURE — 86140 C-REACTIVE PROTEIN: CPT

## 2019-04-01 PROCEDURE — 2580000003 HC RX 258: Performed by: INTERNAL MEDICINE

## 2019-04-01 PROCEDURE — 85025 COMPLETE CBC W/AUTO DIFF WBC: CPT

## 2019-04-01 PROCEDURE — 36569 INSJ PICC 5 YR+ W/O IMAGING: CPT

## 2019-04-01 PROCEDURE — 85014 HEMATOCRIT: CPT

## 2019-04-01 PROCEDURE — 99233 SBSQ HOSP IP/OBS HIGH 50: CPT | Performed by: INTERNAL MEDICINE

## 2019-04-01 PROCEDURE — 80048 BASIC METABOLIC PNL TOTAL CA: CPT

## 2019-04-01 PROCEDURE — 85652 RBC SED RATE AUTOMATED: CPT

## 2019-04-01 PROCEDURE — 97116 GAIT TRAINING THERAPY: CPT

## 2019-04-01 PROCEDURE — C1751 CATH, INF, PER/CENT/MIDLINE: HCPCS

## 2019-04-01 PROCEDURE — 84100 ASSAY OF PHOSPHORUS: CPT

## 2019-04-01 PROCEDURE — 83735 ASSAY OF MAGNESIUM: CPT

## 2019-04-01 PROCEDURE — 36415 COLL VENOUS BLD VENIPUNCTURE: CPT

## 2019-04-01 PROCEDURE — 2500000003 HC RX 250 WO HCPCS: Performed by: NURSE PRACTITIONER

## 2019-04-01 PROCEDURE — 6370000000 HC RX 637 (ALT 250 FOR IP): Performed by: NURSE PRACTITIONER

## 2019-04-01 RX ORDER — LIDOCAINE HYDROCHLORIDE 10 MG/ML
5 INJECTION, SOLUTION EPIDURAL; INFILTRATION; INTRACAUDAL; PERINEURAL ONCE
Status: DISCONTINUED | OUTPATIENT
Start: 2019-04-01 | End: 2019-04-07 | Stop reason: HOSPADM

## 2019-04-01 RX ORDER — SODIUM CHLORIDE 0.9 % (FLUSH) 0.9 %
10 SYRINGE (ML) INJECTION PRN
Status: DISCONTINUED | OUTPATIENT
Start: 2019-04-01 | End: 2019-04-07 | Stop reason: HOSPADM

## 2019-04-01 RX ORDER — CETIRIZINE HYDROCHLORIDE 10 MG/1
10 TABLET ORAL DAILY
Status: DISCONTINUED | OUTPATIENT
Start: 2019-04-01 | End: 2019-04-07 | Stop reason: HOSPADM

## 2019-04-01 RX ORDER — FLUTICASONE PROPIONATE 50 MCG
1 SPRAY, SUSPENSION (ML) NASAL DAILY
Status: DISCONTINUED | OUTPATIENT
Start: 2019-04-01 | End: 2019-04-07 | Stop reason: HOSPADM

## 2019-04-01 RX ORDER — SODIUM CHLORIDE 0.9 % (FLUSH) 0.9 %
10 SYRINGE (ML) INJECTION EVERY 12 HOURS SCHEDULED
Status: DISCONTINUED | OUTPATIENT
Start: 2019-04-01 | End: 2019-04-07 | Stop reason: HOSPADM

## 2019-04-01 RX ADMIN — VANCOMYCIN HYDROCHLORIDE 1250 MG: 1 INJECTION, POWDER, LYOPHILIZED, FOR SOLUTION INTRAVENOUS at 10:07

## 2019-04-01 RX ADMIN — PANTOPRAZOLE SODIUM 40 MG: 40 TABLET, DELAYED RELEASE ORAL at 05:15

## 2019-04-01 RX ADMIN — CETIRIZINE HYDROCHLORIDE 10 MG: 10 TABLET, FILM COATED ORAL at 11:38

## 2019-04-01 RX ADMIN — Medication 3 G: at 20:17

## 2019-04-01 RX ADMIN — GUAIFENESIN AND DEXTROMETHORPHAN 5 ML: 100; 10 SYRUP ORAL at 20:55

## 2019-04-01 RX ADMIN — Medication 3 G: at 11:38

## 2019-04-01 RX ADMIN — ASPIRIN 81 MG 81 MG: 81 TABLET ORAL at 08:18

## 2019-04-01 RX ADMIN — ATORVASTATIN CALCIUM 10 MG: 10 TABLET, FILM COATED ORAL at 20:48

## 2019-04-01 RX ADMIN — ENOXAPARIN SODIUM 40 MG: 40 INJECTION SUBCUTANEOUS at 08:18

## 2019-04-01 RX ADMIN — TAZOBACTAM SODIUM AND PIPERACILLIN SODIUM 3.38 G: 375; 3 INJECTION, SOLUTION INTRAVENOUS at 04:29

## 2019-04-01 RX ADMIN — SODIUM CHLORIDE: 9 INJECTION, SOLUTION INTRAVENOUS at 20:48

## 2019-04-01 RX ADMIN — Medication 10 ML: at 20:49

## 2019-04-01 RX ADMIN — Medication 10 ML: at 08:19

## 2019-04-01 RX ADMIN — FLUTICASONE PROPIONATE 1 SPRAY: 50 SPRAY, METERED NASAL at 11:38

## 2019-04-01 ASSESSMENT — ENCOUNTER SYMPTOMS
TROUBLE SWALLOWING: 0
EYE REDNESS: 0
EYE DISCHARGE: 0
COUGH: 0
WHEEZING: 0
NAUSEA: 0
DIARRHEA: 0
BACK PAIN: 0
CONSTIPATION: 0
SORE THROAT: 0
ABDOMINAL PAIN: 0
RHINORRHEA: 0
SHORTNESS OF BREATH: 0

## 2019-04-01 ASSESSMENT — PAIN SCALES - GENERAL
PAINLEVEL_OUTOF10: 2
PAINLEVEL_OUTOF10: 0
PAINLEVEL_OUTOF10: 2
PAINLEVEL_OUTOF10: 2

## 2019-04-01 ASSESSMENT — PAIN DESCRIPTION - DESCRIPTORS
DESCRIPTORS: ACHING;DULL
DESCRIPTORS: ACHING;DULL

## 2019-04-01 ASSESSMENT — PAIN DESCRIPTION - ORIENTATION
ORIENTATION: ANTERIOR;LEFT
ORIENTATION: LEFT;ANTERIOR

## 2019-04-01 ASSESSMENT — PAIN DESCRIPTION - LOCATION
LOCATION: HIP
LOCATION: HIP

## 2019-04-01 ASSESSMENT — PAIN DESCRIPTION - PAIN TYPE
TYPE: ACUTE PAIN
TYPE: ACUTE PAIN

## 2019-04-01 NOTE — CARE COORDINATION
Attempted to meet with patient but he was unavailable. Plan home with spouse, pending ID. Please consider ordering PT/OT for assistance with discharge planning.    Micheline Brown, Case Management

## 2019-04-01 NOTE — CARE COORDINATION
[address] Transitions Interview     2019    Patient: Simon Crespo Patient : 1950   MRN: [id number]  Reason for Admission: hip pain  RARS: Readmission Risk Score: 15         Spoke with: Simon Crespo      Readmission Risk  Patient Active Problem List   Diagnosis    Chest pain on exertion    Exertional chest pain    ACS (acute coronary syndrome) (HCC)    Abnormal stress test    Coronary artery disease involving native coronary artery of native heart without angina pectoris    S/P CABG x 5    Elevated PSA    Mixed hyperlipidemia    Pro[facility] cancer ([facility] [address]    Psoas abscess, left ([facility] [address]    Leukocytosis    Fever    Sepsis due to methicillin susceptible Staphylococcus aureus ([facility] [address]    History of prostate cancer    Status post prostatectomy    Coronary artery disease due to lipid rich plaque    Overweight       Inpatient Assessment  Care Transitions Summary    Care Transitions Inpatient Review  Medication Review  Do you have all of your prescriptions and are they filled?:  Yes   Are you able to afford your medications?:  Yes  How often do you have difficulty taking your medications?:  I always take them as prescribed. Housing Review  Who do you live with?:  Partner/Spouse/SO  Are you an active caregiver in your home?:  No  Social Support  Do you have a ?:  No  Do you have a [address] [place] [address]?:  No  Durable Medical Equipment  Functional Review  Ability to seek help/take action for Emergent/Urgent situations i.e. fire, crime, inclement weather or health crisis. :  Independent  Ability handle personal hygiene needs (bathing/dressing/grooming): Independent  Ability to manage medications: Independent  Ability to prepare food:  Independent  Ability to maintain home (clean home, laundry): Independent  Ability to drive and/or has transportation:  Independent  Ability to do shopping:  Independent  Ability to manage finances:   Independent  Is patient able to live

## 2019-04-01 NOTE — PROGRESS NOTES
Infectious Diseases   Progress Note      Admission Date: 3/30/2019  Hospital Day: Hospital Day: 3  Attending: Kailey Brown MD  Date of service: 4/1/2019    Presenting complaint:   Chief Complaint   Patient presents with    Hip Pain     prostate removed 1/28th, patient reporting everything was great after surgery, then this week he thinks he must have caught the flu where hes had high fevers, and his left hip has been hurting so severely he hasnt been able to walk.         Chief complaint/ Reason for consult: The patient was seen today for the following:    · Sepsis with high fever and marked leukocytosis   · Complicated left pelvic/psoas abscess status postoperative ileus drainage with [address] [place] [address] drain placement - culture positive for MSSA  · Left hip pain  · Mixed hyperlipidemia    Microbiology:        I have reviewed all available micro lab data and cultures    · Blood culture (2/2) - collected on 3/30/2019: Negative  · Body fluid (left pelvic abscess fluid ) culture - collected on 3/30/2019: MSSA    · Urine culture  - collected on 3/30/2019: Negative        Problem list:       Patient Active Problem List   Diagnosis Code    Chest pain on exertion R07.9    Exertional chest pain R07.9    ACS (acute coronary syndrome) (HCC) I24.9    Abnormal stress test R94.39    Coronary artery disease involving native coronary artery of native heart without angina pectoris I25.10    S/P CABG x 5 Z95.1    Elevated PSA R97.20    Mixed hyperlipidemia E78.2    Prostate cancer ([facility] [address] C61    Psoas abscess ([facility] [address] K68.12    Leukocytosis D72.829    Fever R50.9    Sepsis due to methicillin susceptible Staphylococcus aureus ([facility] [address] A41.01    History of prostate cancer Z85.46    Status post prostatectomy Z90.79    Coronary artery disease due to lipid rich plaque I25.10, I25.83    Overweight E66.3         Assessment:     The patient is a 79 y.o. old male who  has a past medical history of CAD (coronary artery disease), Mixed hyperlipidemia (11/26/2018), and Prostate cancer ([facility] [address] (1/21/2019). with following problems:    · Sepsis with high fever and marked leukocytosis   · Complicated left pelvic/psoas abscess status postoperative ileus drainage with [address] TREVIN drain placement - culture positive for MSSA  · CA prostate status post prostatectomy on 1/28/19  · Left hip pain  · Mixed hyperlipidemia  · Coronary artery disease  · Overweight due to excess calorie intake : Body mass index is 29.13 kg/m². Discussion:      The patient had prostatectomy for CA prostate on 1/28/2019. He started having fatigue and fever a few days ago and started having left hip pain. Was found to be severely septic on admission. CT scan showed large abscess involving the left pelvic sidewall measuring 16.2 x 8.1 x 4.1 cm. CT-guided drainage was done and 90 mL of cloudy fluid aspiration yesterday    Abscess cultures are coming back positive for MSSA    Plan:     Diagnostic Workup:    · Will order Baseline sed rate and CRP   · Continue to follow  fever curve, WBC count and blood cultures  · Follow up on liver and renal function     Antimicrobials:    · Will stop IV vancomycin today   · Will stop  IV cefepime today   · Will order IV cefazolin 3 g every 8 hours   · Continue to monitor his vitals closely   · Will order a PICC line for him   · Will follow-up on the culture results and make further recommendations accordingly   · He'll likely need long-term IV antibiotics   · Pain control  · TREVIN drain site care  · DVT prophylaxis   · Discussed the above plan with patient and RN   · Discussed all above with Dr. Juvencio Simmons    Drug Monitoring:    · Continue monitoring for antibiotic toxicity as follows: CMP   · Continue to watch for following: new or worsening fever, new hypotension, hives, lip swelling and redness or purulence at vascular access sites.      I/v access Management:    · Continue to monitor i.v access sites for erythema, induration,discharge or tenderness. · As always, continue efforts to minimize tubes/ lines/ drains as clinically appropriate to reduce chances of line associated infections. Patient education and counseling:     · The patient was educated in detail about the side-effects of various antibiotics and things to watch for like new rashes, lip swelling, severe reaction, worsening diarrhea, break through fever etc.  · Discussed patient's condition and what to expect. All of the patient's questions were addressed in a satisfactory manner and patient verbalized understanding all instructions. Risk of Complications/Morbidity: High     · Illness(es)/ Infection present that pose threat to bodily function. · There is potential for severe exacerbation of infection/side effects of treatment. · Therapy requires intensive monitoring for antimicrobial agent toxicity. Thank you for involving me in the care of your patient. I will continue to follow. If you have any additional questions, please do not hesitate to contact me. Subjective: Interval history: Patient was seen and examined at bedside. Interval history was obtained. The patient feels tired. He is on IV vancomycin and IV cefepime. He is tolerating antibiotics okay. Has a TREVIN drain in place in the left pelvic abscess     REVIEW OF SYSTEMS:  *    Review of Systems   Constitutional: Positive for fatigue. Negative for chills, diaphoresis and fever. HENT: Negative for ear discharge, ear pain, rhinorrhea, sore throat and trouble swallowing. Eyes: Negative for discharge and redness. Respiratory: Negative for cough, shortness of breath and wheezing. Cardiovascular: Negative for chest pain and leg swelling. Gastrointestinal: Negative for abdominal pain, constipation, diarrhea and nausea. Endocrine: Negative for polyuria. Genitourinary: Negative for dysuria, flank pain, frequency, hematuria and urgency. Musculoskeletal: Negative for back pain and myalgias.    Skin: Negative for rash. Neurological: Negative for dizziness, seizures and headaches. Hematological: Does not bruise/bleed easily. Psychiatric/Behavioral: Negative for hallucinations and suicidal ideas. All other systems reviewed and are negative. Past Medical History: All past medical history reviewed today. Past Medical History:   Diagnosis Date    CAD (coronary artery disease)     Mixed hyperlipidemia 11/26/2018    Prostate cancer ([facility] [address] 1/21/2019       Past Surgical History: All past surgical history was reviewed today. Past Surgical History:   Procedure Laterality Date    CARDIAC CATHETERIZATION  12/28/2015    Dr Donna Cantor    COLONOSCOPY  1-10-11    CORONARY ARTERY BYPASS GRAFT  12.30.2015    Mobile    DENTAL SURGERY      PROSTATECTOMY N/A 1/28/2019    ROBOTIC LAPAROSCOPIC RADICAL PROSTATECTOMY WITH BILATERAL LYMPH NODE DISSECTION AND RIGHT SIDE NERVE SPARING performed by Milka Wasserman MD at [facility] [place]         Immunization History: All immunization history was reviewed by me today. There is no immunization history for the selected administration types on file for this patient. Family History: All family history was reviewed today. Problem Relation Age of Onset    Heart Disease Father     Stroke Mother     Other Sister        Objective:       PHYSICAL EXAM:      Vitals:   Vitals:    03/31/19 2330 04/01/19 0430 04/01/19 0705 04/01/19 0830   BP: 118/66 109/65  139/78   Pulse: 70 71  69   Resp: 16 16  18   Temp: 98.6 °F (37 °C) 97.7 °F (36.5 °C)  98 °F (36.7 °C)   TempSrc: Temporal Temporal  Temporal   SpO2: 97% 96%  96%   Weight:   191 lb 9.6 oz (86.9 kg)    Height:           Physical Exam   Constitutional: He is oriented to person, place, and time. He appears well-developed. HENT:   Head: Normocephalic and atraumatic. Mouth/Throat: Oropharynx is clear and moist. No oropharyngeal exudate. Eyes: Pupils are equal, round, and reactive to light.  Conjunctivae and EOM are normal. Right eye exhibits no discharge. Left eye exhibits no discharge. No scleral icterus. Neck: Normal range of motion. Neck supple. Cardiovascular: Normal rate and regular rhythm. Exam reveals no friction rub. No murmur heard. Pulmonary/Chest: No stridor. No respiratory distress. He has no wheezes. He has no rales. Abdominal: Soft. Bowel sounds are normal. There is no tenderness. There is no rebound and no guarding. TREVIN drain in place in the left pelvic abscess with ongoing purulence   Musculoskeletal: Normal range of motion. He exhibits no edema or tenderness. Lymphadenopathy:     He has no cervical adenopathy. He has no axillary adenopathy. Neurological: He is alert and oriented to person, place, and time. He exhibits normal muscle tone. Skin: Skin is warm and dry. No rash noted. He is not diaphoretic. No erythema. Psychiatric: He has a normal mood and affect. His behavior is normal.   Nursing note and vitals reviewed. Lines: All vascular access sites are healthy with no local erythema, discharge or tenderness. Intake and output:    I/O last 3 completed shifts: In: 2375 [I.V.:2375]  Out: 36 [Urine:725; Drains:95]    Lab Data:   All available labs and old records have been reviewed by me.     CBC:  Recent Labs     03/29/19  1604 03/30/19  1708 03/31/19  0524 04/01/19  0011 04/01/19  0434   WBC 29.3* 30.2* 28.4*  --  27.0*   RBC 4.53 4.77 4.20  --  3.93*   HGB 12.6* 13.4* 11.8* 11.1* 11.2*   HCT 38.6* 40.9 35.4* 33.3* 33.3*    302 285  --  322   MCV 85.3 85.8 84.3  --  84.7   MCH 27.9 28.1 28.1  --  28.4   MCHC 32.7 32.7 33.4  --  33.5   RDW 13.9 14.3 14.1  --  14.1   NRBC 1*  --   --   --   --    BANDSPCT 18* 14* 10*  --  6        BMP:  Recent Labs     03/30/19  1708 03/31/19  0524 04/01/19  0434   * 134* 135*   K 3.7 3.8 3.9   CL 94* 103 105   CO2 21 19* 21   BUN 27* 26* 18   CREATININE 1.2 1.0 0.8   CALCIUM 8.8 8.1* 8.2*   GLUCOSE 126* 104* 108*        Hepatic Function Panel:   Lab Results   Component Value Date    ALKPHOS 230 03/31/2019    ALT 48 03/31/2019    AST 50 03/31/2019    PROT 5.6 03/31/2019    BILITOT 1.9 03/31/2019    BILIDIR 1.4 03/31/2019    IBILI 0.5 03/31/2019    LABALBU 2.2 03/31/2019       CPK: No results found for: CKTOTAL  ESR:   Lab Results   Component Value Date    SEDRATE 75 (H) 03/30/2019     CRP: No results found for: CRP        Imaging: All pertinent images and reports for the current visit were reviewed by me during this visit. CT ABSCESS DRAINAGE W CATH PLACEMENT S&I   Final Result   Technically successful CT-guided placement of a 12 [place] locking pigtail   drainage catheter into the deep left iliopsoas fluid collection. CT ABDOMEN PELVIS W IV CONTRAST Additional Contrast? None   Final Result   1. Large lobular fluid collection along the left pelvic sidewall involving   the distal psoas and iliacus muscles measuring approximately 16.2 x 8.1 x 4.1   cm with mild adjacent stranding. This may represent an abscess or liquefying   hematoma. 2. Status post prostatectomy. Medications: All current and past medications were reviewed.      cetirizine  10 mg Oral Daily    fluticasone  1 spray Each Nare Daily    ceFAZolin  3 g Intravenous Q8H    pantoprazole  40 mg Oral QAM AC    aspirin  81 mg Oral Daily    atorvastatin  10 mg Oral Nightly    sennosides-docusate sodium  1 tablet Oral BID    sodium chloride flush  10 mL Intravenous 2 times per day    enoxaparin  40 mg Subcutaneous Daily        sodium chloride 100 mL/hr at 03/31/19 2336       guaiFENesin-dextromethorphan, benzonatate, morphine, sodium chloride flush, ondansetron, potassium chloride **OR** potassium alternative oral replacement **OR** potassium chloride, magnesium sulfate, acetaminophen, oxyCODONE-acetaminophen    Current antibiotics: All antibiotics and their doses were reviewed by me today    Recent Abx Admin                   vancomycin (VANCOCIN) 1,250 mg in dextrose 5 % 250 mL IVPB (mg) 1,250 mg New Bag 04/01/19 1007    piperacillin-tazobactam (ZOSYN) 3.375 g in dextrose 50 mL IVPB extended infusion (premix) (g) 3.375 g New Bag 04/01/19 0429     3.375 g New Bag 03/31/19 2030     3.375 g New Bag  1221                Known drug Allergies: All allergies were reviewed and updated    No Known Allergies        Please note that this chart was generated using Dragon dictation software. Although every effort was made to ensure the accuracy of this automated transcription, some errors in transcription may have occurred inadvertently. If you may need any clarification, please do not hesitate to contact me through EPIC or at the phone number provided below with my electronic signature.     Jaron Soliman MD, MPH  4/1/2019, 10:48 AM  [facility] Infectious Disease   Office: [phone]  Fax: [phone]  Tuesday AM clinic:   [address] [place] [address], [facility] [address]  Thursday AM [id number] [facility]

## 2019-04-01 NOTE — PROGRESS NOTES
Urology Progress Note  [facility]    Provider: Randolph Gowers MD Patient ID:  Admission Date: 3/30/2019 Name: Mike Abreu Date: 3/30/2019 MRN: [id number]   Patient Location: [id number] : 1950  Attending: Caprice Hall MD Date of Service: 2019  PCP: John Fournier DO     Diagnoses:  1. Psoas abscess ([address]    2. Prostate cancer     Assessment/Plan:  Psoas abscess vs. Hematoma vs. lymphocele  - CT reveals large fluid collection 16cm along psoas/iliacus muscles with adjacent strading  Leukocytosis  Significant abdominal/hip pain prompting presentation to ED  Patient empties well, clear urine  History of robotic prostatectomy with bilateral pelvic lymph node dissection 2019  - path pT2, [place] 6, neg margins, N0        Recommendations:  - Perc drain placed by IR, 90 cc aspirated, ~100 over last 24 hours, culture pending, prelim staph; cont abx  - Bladder scan 0; hold on álvarez catheter  - Urology will follow please call with any questions            Subjective:   Edy Wall is a 79 y.o. male. He was seen and examined this morning. Today we discussed continued IR drain for abscess     Objective:   Vitals:  Vitals:    19 0430   BP: 109/65   Pulse: 71   Resp: 16   Temp: 97.7 °F (36.5 °C)   SpO2: 96%       Intake/Output Summary (Last 24 hours) at 2019 0757  Last data filed at 2019 0700  Gross per 24 hour   Intake 2375 ml   Output 820 ml   Net 1555 ml     Physical Exam:  Constitutional: ill appearing, otherwise NAD, well-developed, well-nourished. HEENT: MMM. Hearing intact. Neck: no thyroid masses appreciated. Trachea is midline. Neck appears unremarkable   Lymph: no palpable adenopathy in supraclavicular, or axillary lymph nodes  Cardiovascular: Regular rate. No peripheral edema  Respiratory: Respirations are even and non-labored. No audible breath sounds.    Genitourinary: inc c/d/i, mild tenderness LLQ, no CVAT; IR drain with purulent output and less tender  Abdomen: Soft. No distension, decreased tenderness, hernias, masses or guarding. No CVA tenderness. No hernias appreciated.  Liver and spleen appear normal        Labs:  Lab Results   Component Value Date    WBC 27.0 (H) 04/01/2019    HGB 11.2 (L) 04/01/2019    HCT 33.3 (L) 04/01/2019    MCV 84.7 04/01/2019     04/01/2019     Lab Results   Component Value Date    CREATININE 0.8 04/01/2019    BUN 18 04/01/2019     (L) 04/01/2019    K 3.9 04/01/2019     04/01/2019    CO2 21 04/01/2019       Patria Mcgee MD  4/1/2019

## 2019-04-01 NOTE — PROGRESS NOTES
[facility]        Progress Note    Patient Name: Simon Crespo  MRN: [id number]  YOB: 1950  Date of Evaluation: 2019    Chief Complaint: Abdominal/groin pain    Subjective:  No acute events overnight  Pain controlled, reporting most pain in right thigh--described as a dull ache  No nausea or vomiting, tolerating general diet  No reported issues with drain  Resting in bed at this time      Vital Signs:  Patient Vitals for the past 24 hrs:   BP Temp Temp src Pulse Resp SpO2 Weight   19 0830 139/78 98 °F (36.7 °C) Temporal 69 18 96 % --   19 0705 -- -- -- -- -- -- 191 lb 9.6 oz (86.9 kg)   19 0430 109/65 97.7 °F (36.5 °C) Temporal 71 16 96 % --   19 2330 118/66 98.6 °F (37 °C) Temporal 70 16 97 % --   19 2030 132/72 98.8 °F (37.1 °C) Temporal 78 16 98 % --   19 1555 129/77 98.6 °F (37 °C) Temporal 76 14 98 % --   19 1200 138/72 97.8 °F (36.6 °C) Temporal 71 20 99 % --   19 1030 134/74 97.8 °F (36.6 °C) Temporal 74 -- -- --   19 1019 124/67 -- -- 72 22 100 % --   19 1009 125/67 -- -- 75 23 100 % --   19 1005 125/67 -- -- 74 -- 100 % --   19 1000 120/65 -- -- 75 22 94 % --   19 0956 114/66 -- -- 75 21 98 % --   19 0952 117/65 -- -- 76 22 99 % --      TEMPERATURE HISTORY 24H: Temp (24hrs), Av.2 °F (36.8 °C), Min:97.7 °F (36.5 °C), Max:98.8 °F (37.1 °C)    BLOOD PRESSURE HISTORY: Systolic (28AQY), BNC:933 , Min:109 , KSQ:530    Diastolic (32UHP), DJM:18, Min:60, Max:89      Intake/Output:  I/O last 3 completed shifts: In: 2375 [I.V.:2375]  Out: 820 [Urine:725; Drains:95]  No intake/output data recorded.   Drain/tube Output:  Closed/Suction Drain Left LLQ Bulb 12 Taiwanese-Output (ml): 50 ml    Physical Exam:  General: awake, alert, oriented to  person, place, time  Lungs: clear to auscultation  Heart: RRR  Abdomen: soft, mildly tenderness at drain site only, nondistended, no masses or organomegaly   Drain: cloudy yellow output  SKIN:  no bruising or bleeding and normal skin color, texture, turgor      Labs:  CBC:    Recent Labs     03/30/19 1708 03/31/19 0524 04/01/19  0011 04/01/19  0434   WBC 30.2* 28.4*  --  27.0*   HGB 13.4* 11.8* 11.1* 11.2*   HCT 40.9 35.4* 33.3* 33.3*    285  --  322     BMP:    Recent Labs     03/30/19 1708 03/31/19 0524 04/01/19  0434   * 134* 135*   K 3.7 3.8 3.9   CL 94* 103 105   CO2 21 19* 21   BUN 27* 26* 18   CREATININE 1.2 1.0 0.8   GLUCOSE 126* 104* 108*     Hepatic:   Recent Labs     03/30/19 1708 03/31/19 0524   AST 83* 50*   ALT 63* 48*   BILITOT 2.2* 1.9*   ALKPHOS 249* 230*     Amylase: No results for input(s): AMYLASE in the last 72 hours. Lipase: No results for input(s): LIPASE in the last 72 hours. Mag:      Recent Labs     03/31/19 0524 04/01/19 0434   MG 2.00 2.10      Phos:     Recent Labs     03/31/19 0524 04/01/19  0434   PHOS 2.4* 2.7      Coags:   Recent Labs     03/31/19 0524   INR 1.25*   APTT 25.6*       Cultures:  Anaerobic culture  No results for input(s): LABANAE in the last 72 hours. Blood culture  Recent Labs     03/30/19  1850   BC No Growth to date. Any change in status will be called. Blood culture 2  Recent Labs     03/30/19 2012   BLOODCULT2 No Growth to date. Any change in status will be called. Body fluid culture  Recent Labs     03/30/19 2012   BLOODCULT2 No Growth to date. Any change in status will be called. Surgical culture  No results for input(s): CXSURG in the last 72 hours. Fecal occult  No results for input(s): OCCULTBLDFEC in the last 72 hours. Gram stain  Recent Labs     03/31/19  1015   LABGRAM 1+ Epithelial Cells  3+ WBC's (Polymorphonuclear)  3+ Gram positive cocci         Stool culture 1  No results for input(s): CXST in the last 72 hours. Stool culture 2  No results for input(s): STOOLCULT2 in the last 72 hours.     Urine culture  Recent Labs 03/30/19  1708   LABURIN No growth at 18-36 hours       Wound abscess  No results for input(s): WNDABS in the last 72 hours. Pathology:  NA    Imaging:  I have personally reviewed the following films:    Xr Hip Left (2-3 Views)    Result Date: 3/28/2019  EXAMINATION: 2 XRAY VIEWS OF THE LEFT HIP 3/28/2019 12:16 pm COMPARISON: None. HISTORY: ORDERING SYSTEM PROVIDED HISTORY: Left hip pain TECHNOLOGIST PROVIDED HISTORY: Reason for exam:->pain Ordering Physician Provided Reason for Exam: No known injury. Pain in crease of left leg. started 2 days ago. FINDINGS: The left hip is intact. No fracture or dislocation. There is mild degenerative change in both hip joints with sclerotic change to the acetabulum and mild osteophyte formation. Pelvic bones intact. Vascular phleboliths are present throughout the pelvis. No other significant soft tissue findings. No acute finding in the left hip. Ct Abscess Drainage W Cath Placement S&i    Result Date: 4/1/2019  PROCEDURE: CT GUIDED ABSCESS FLUID DRAIN MODERATE CONSCIOUS SEDATION 3/31/2019 HISTORY: ORDERING SYSTEM PROVIDED HISTORY: abdominal abscess TECHNOLOGIST PROVIDED HISTORY: Reason for exam:->abdominal abscess Ordering Physician Provided Reason for Exam: abdominal abscess drainage Acuity: Acute Type of Exam: Initial TECHNIQUE: Dose modulation, iterative reconstruction, and/or weight based adjustment of the mA/kV was utilized to reduce the radiation dose to as low as reasonably achievable. CONTRAST: None. SEDATION: 2 mgversed and 100 mcg fentanyl were titrated intravenously for moderate sedation monitored under my direction. Total intraservice time of sedation was 15 minutes. The patient's vital signs were monitored throughout the procedure and recorded in the patient's medical record by the nurse.  FLUOROSCOPY DOSE AND TYPE OR TIME AND EXPOSURES: 123 CT images, 360.7 mGy DESCRIPTION OF PROCEDURE: Informed consent was obtained after a detailed explanation of the procedure including risks, benefits, and alternatives. Universal protocol was observed. Limited CT imaging was performed through the lower abdomen and pelvis. An appropriate skin entry site was selected. The left groin is prepped and draped in sterile fashion. 1% lidocaine local anesthesia was used. Using CT guidance, a 5 Kuwaiti needle sheath was advanced into the inferior iliopsoas/left pelvic sidewall collection. Wire was advanced into the collection. The tract was dilated and a 12 Kuwaiti locking pigtail drainage catheter was placed into the collection. The catheter was placed to bulb suction. The catheter was secured to the skin using suture. The site was dressed and bandaged in sterile fashion. The patient tolerated procedure well without immediate complication. FINDINGS: 90 mL of cloudy tan fluid was aspirated and sent for culture per request ordering clinician. Technically successful CT-guided placement of a 12 Kuwaiti locking pigtail drainage catheter into the deep left iliopsoas fluid collection. Ct Abdomen Pelvis W Iv Contrast Additional Contrast? None    Result Date: 3/30/2019  EXAMINATION: CT OF THE ABDOMEN AND PELVIS WITH CONTRAST 3/30/2019 6:09 pm TECHNIQUE: CT of the abdomen and pelvis was performed with the administration of intravenous contrast. Multiplanar reformatted images are provided for review. Dose modulation, iterative reconstruction, and/or weight based adjustment of the mA/kV was utilized to reduce the radiation dose to as low as reasonably achievable. COMPARISON: None. HISTORY: ORDERING SYSTEM PROVIDED HISTORY: left hip pain. ..include bone window please TECHNOLOGIST PROVIDED HISTORY: Additional Contrast?->None Ordering Physician Provided Reason for Exam: Hip Pain (prostate removed 1/28th, patient reporting everything was great after surgery, then this week he thinks he must have caught the flu where hes had high fevers, and his left hip has been hurting so severely he hasnt been able to walk. ) Acuity: Acute Type of Exam: Initial FINDINGS: Lower Chest: Coronary artery atherosclerotic vascular calcifications. Mild bibasilar atelectasis. Trace left pleural effusion. Organs: 9 mm hypodensity in the left hepatic lobe on axial image 51 too small to definitively characterize but likely to represent a benign cyst or hemangioma. The gallbladder is normal in appearance. No biliary ductal dilatation. The pancreas, spleen, and bilateral adrenal glands are unremarkable. Small bilateral renal hypodensities measuring less than 1 cm too small to definitively characterize but likely to represent benign cysts. No obstructive uropathy. GI/Bowel: Normal appendix. Sigmoid colon diverticulosis. No bowel obstruction or abnormal bowel wall thickening. Pelvis: The urinary bladder is unremarkable. The prostate gland is not clearly visualized possibly status post prostatectomy. There is a large lobular fluid collection along the left pelvic sidewall partially involving the distal psoas and iliacus muscles measuring approximately 16.2 x 8.1 x 4.1 cm on sagittal image 106 and axial image 161. Adjacent fat stranding is noted. No pelvic or inguinal lymphadenopathy. No free intraperitoneal fluid in the pelvis. Peritoneum/Retroperitoneum: The abdominal aorta is normal in caliber with mild atherosclerotic vascular disease. No retroperitoneal or mesenteric lymphadenopathy is identified. No free air or fluid is seen in the abdomen. Bones/Soft Tissues: Status post bilateral vasectomy. Postoperative scarring in the anterior abdominal wall. Mild subcutaneous fat stranding in the left anterolateral abdominal wall. Status post median sternotomy. No acute osseous abnormality. 1. Large lobular fluid collection along the left pelvic sidewall involving the distal psoas and iliacus muscles measuring approximately 16.2 x 8.1 x 4.1 cm with mild adjacent stranding.   This may represent an abscess or comorbid etiologies per primary team and consulting services    EDUCATION:  Educated patient on plan of care and disease process--all questions answered. Plans discussed with patient and nursing. Reviewed and discussed with Dr. Lionel Adames. Signed:  NIHARIKA Dumont - CNP  4/1/2019 9:51 AM    I have personally performed a face to face diagnostic evaluation on this patient. I have interviewed and examined the patient and I agree with the assessment above. In summary, my findings and plan are the following:   Mr. Kehinde Murcia is a 79 y.o. male who presents with   Left psoas abscess, s/p percutaneous drainage 3/31/19  Recent robot prostatectomy  CAD, s/p CABG     Plan:  1. Appreciate IR support, s/p percutaneous drainage, keep in place and anticipate repeat CT in several days  2. Antibiotics per ID, follow culture  3. Tolerating diet  4. Pain medication as needed  5. No indication for surgical exploration at this time    Rabia Adames MD, FACS  4/1/2019  12:45 PM

## 2019-04-01 NOTE — PLAN OF CARE
Problem: Musculor/Skeletal Functional Status  Goal: Highest potential functional level  Outcome: Ongoing  Note:   Patient denies needs related to ADLs at the time of this note. Seen by therapy for use of assistive devices such as crutches and walker. See therapy's note. SBA to bathroom, calls out appropriately for needs. Problem: Musculor/Skeletal Functional Status  Goal: Absence of falls  Outcome: Ongoing  Note:   Patient has remained free of falls at the time of this note. Preventative measures in place: bed in lowest position, call light within reach, bed alarm, non skid socks. Patient's ambulatory status: SBA with walker. Medium fall risk. Calls out appropriately for needs

## 2019-04-01 NOTE — PROGRESS NOTES
Physical Therapy    Facility/Department: [address]  Initial Assessment    NAME: Lujean Habermann  : 1950  MRN: [id number]    Date of Service: 2019    Discharge Recommendations: Lujean Habermann scored a 18/24 on the AM-PAC short mobility form. At this time, no further PT is recommended upon discharge due to pt has 24/7 assistance from his wife. Recommend use of RW at discharge. Follow up with physicians. PT Equipment Recommendations  Equipment Needed: Yes  Mobility Devices: [place]: Rolling  Other: Pt requires RW for safety with standing and ambulation in his home    Assessment   Body structures, Functions, Activity limitations: Decreased functional mobility ; Decreased ROM; Decreased strength;Decreased balance; Increased Pain  Assessment: Pt functional mobility is limited by the above deficits and would benefit from skilled PT services to maximize pt functional mobility and safety prior to discharge. Prognosis: Good  Decision Making: Medium Complexity  Patient Education: PT eval, POC  Barriers to Learning: none  REQUIRES PT FOLLOW UP: Yes  Activity Tolerance  Activity Tolerance: Patient Tolerated treatment well;Patient limited by pain       Patient Diagnosis(es): The encounter diagnosis was Psoas abscess ([facility] [address] has a past medical history of CAD (coronary artery disease), Mixed hyperlipidemia, and Prostate cancer ([facility] [address] has a past surgical history that includes Dental surgery; Colonoscopy (1-10-11); Cardiac catheterization (2015); Coronary artery bypass graft (2015); and Prostatectomy (N/A, 2019).     Restrictions  Restrictions/Precautions  Restrictions/Precautions: Fall Risk(high fall risk)  Required Braces or Orthoses?: No  Position Activity Restriction  Other position/activity restrictions: 80 yo man with hx prostate ca (s/p prostatectomy 2019), CAD, elevated lipids; developed severe L hip pain 3/27/19, unable to ambulate; fluid collection L lower abdomen and distal psoas; CT guided abscess drainage on 3/31; catheter in place to drain     Vision/Hearing  Vision: Impaired  Vision Exceptions: Wears glasses for reading  Hearing: Within functional limits       Subjective  General  Chart Reviewed: Yes  Response To Previous Treatment: Not applicable  Diagnosis: L Psoas Abscess  Follows Commands: Within Functional Limits  General Comment  Comments: Patient found seated in bed. Pt alert and agreeable to PT. Subjective  Subjective: Mr. Roberta Soto is a 79 y.o. male who presents with left leg pain with a dull aching, sharp left lower quadrant pain. Began 5 days ago, fevers 2 days prior, then had difficulty straightening left leg, hip. Constant, progressively worse. Nothing made worse, flexing hip makes pain more tolerable. Pain Screening  Patient Currently in Pain: Yes  Pain Assessment  Pain Assessment: 0-10  Pain Level: 2  Pain Type: Acute pain  Pain Location: Hip  Pain Orientation: Left; Anterior  Pain Descriptors: Aching;Dull  Vital Signs  Patient Currently in Pain: Yes       Orientation  Orientation  Overall Orientation Status: Within Normal Limits  Orientation Level: Oriented X4     Social/Functional History  Social/Functional History  Lives With: Daughter(27 y/o daughter)  Type of Home: House  Home Layout: One level  Home Access: Stairs to enter with rails  Entrance Stairs - Number of Steps: 3 DESIREE  Entrance Stairs - Rails: Right  Bathroom Shower/Tub: Walk-in shower, Shower chair without back(stool)  Bathroom Toilet: Handicap height  Bathroom Equipment: Shower chair, Hand-held shower  Home Equipment: [facility] Help From: Family  ADL Assistance: Independent  Homemaking Assistance: Independent  Homemaking Responsibilities: Yes(laundry. dishes; wife cooks.  Pt assists as needed around house.)  Laundry Responsibility: Primary  Ambulation Assistance: Independent  Transfer Assistance: Independent  Active : Yes  Occupation: Part time employment  Type of occupation: [employer]  Leisure & Hobbies: build and fly airplanes, running, walking, cycling  Additional Comments: No falls in the past six months     Objective     Observation/Palpation  Observation: Patient presents with about 20 degrees of hip flexion in standing due to pain in L iliacus     AROM RLE (degrees)  RLE AROM: WFL  PROM LLE (degrees)  LLE PROM: WFL  AROM LLE (degrees)  LLE AROM : Exceptions  LLE General AROM: painful and limited hip flexion, unable to achieve full hip extension due to pain, lacking about 20-25 degrees from full hip ext  Strength RLE  Strength RLE: WFL  Strength LLE  Comment: ankle and knee WNLs; hip flexion limited -2/5     Sensation  Overall Sensation Status: [facility]     Bed mobility  Supine to Sit: Modified independent(HOB elevated; assists LLE with RLE or UEs)  Scooting: Independent     Transfers  Sit to Stand: Stand by assistance  Stand to sit: Stand by assistance     Ambulation  Surface: level tile  Device: Rolling Walker; Axillary Crutches  Assistance: Stand by assistance  Quality of Gait: pt amb first with B axillary crutches, flexed posture; cues to stand as upright as tolerated; pt amb with step-to pattern with small step lengths, unable to advance LLE without scooting foot forward due to pain/weakness L hip flexors; amb back to chair with RW, improved safety and posture, continues with difficulty advancing LLE, cues to focus on L DF to assist clearing foot   Distance: 16 ft x 2  Comments: pt felt safer with use of RW  Stairs/Curb  Stairs?: No     Balance  Posture: Fair(unable to stand fully upright due to limited hip ext LLE)  Sitting - Static: Good  Sitting - Dynamic: Good;-  Standing - Static: Good  Standing - Dynamic: Good;-  Comments: requires use of AD for mobility due to pain, weakness LLE      Exercise: Pt instructed in AROM as tolerated BLEs     Plan   Plan  Times per week: 3-5x/week  Times per day: Daily  Current Treatment Recommendations: Strengthening, ROM, Balance Training, Functional Mobility Training, Transfer Training, Stair training, Gait Training, Safety Education & Training, Equipment Evaluation, Education, & procurement  Safety Devices  Type of devices:  All fall risk precautions in place, Call light within reach, Chair alarm in place, Gait belt, Patient at risk for falls, Left in chair, Nurse notified(ALECIA Nelson)      AM-PAC Score  AM-PAC Inpatient Mobility Raw Score : 18  AM-PAC Inpatient T-Scale Score : 43.63  Mobility Inpatient CMS 0-100% Score: 46.58  Mobility Inpatient CMS G-Code Modifier : CK          Goals  Short term goals  Time Frame for Short term goals: discharge  Short term goal 1: bed mobility with mod I  Short term goal 2: transfers with mod I  Short term goal 3: amb 50 ft with RW with supervision  Short term goal 4: amb up/down 3 steps with railing with SBA  Patient Goals   Patient goals : discharge home; return to PLOF       Therapy Time   Individual Concurrent Group Co-treatment   Time In 0940         Time Out 1020         Minutes 40         Timed Code Treatment Minutes: [address], [address] [facility]

## 2019-04-01 NOTE — PROGRESS NOTES
Occupational Therapy   Occupational Therapy Initial Assessment  Date: 2019   Patient Name: Nelly Davidson  MRN: [id number]     : 1950    Date of Service: 2019    Discharge Recommendations:  Nelly Davidson scored a 20/24 on the -[facility] ADL Inpatient form. At this time, no further OT is recommended upon discharge due to pt has assist at home and safe with functional mobility. Once symptoms recede, expect pt back to baseline level of occupational function. Recommend patient returns to prior setting. OT Equipment Recommendations  Equipment Needed: No    Assessment   Performance deficits / Impairments: Decreased functional mobility ; Decreased ADL status; Decreased high-level IADLs  Assessment: Pt is not at his baseline level of occupational function, based on the above deficits associated with psoas abscess. Pt would benefit from continued skilled acute OT services to address these deficits. Treatment Diagnosis: Decreased ADL/IADL status and functional mobility associated with psoas abscess  Prognosis: Good  Decision Making: Low Complexity  History: Pt lives w/wife, 76 yo, independent ADLs, IADLs, ambulation, very active, drives, retired. PMH: Prostate Ca, s/p prostatectomy 19, CABG 15, CAD  Exam: ROM, MMT, 6 clicks, 3 performance deficits, stable  Assistance / Modification: SBA for functional mobility/transfers w/RW, Mod A for LB dressing  Patient Education: OT eval, POC, d/c recommendation  Barriers to Learning: None  REQUIRES OT FOLLOW UP: Yes  Activity Tolerance  Activity Tolerance: Patient Tolerated treatment well  Safety Devices  Safety Devices in place: Yes  Type of devices: Chair alarm in place; Left in chair; All fall risk precautions in place;Gait belt;Nurse notified           Patient Diagnosis(es): The encounter diagnosis was Psoas abscess ([facility] [address] has a past medical history of CAD (coronary artery disease), Mixed hyperlipidemia, and Prostate cancer ([facility] [address]    has a past surgical history shoulder)  L Hand Grasp: 5/5  RUE Strength  Gross RUE Strength: WNL(5/5 elbow, shoulder)  R Hand Grasp: 5/5                   Plan   Plan  Times per week: 3-5  Times per day: Daily  Current Treatment Recommendations: Functional Mobility Training, Safety Education & Training, Home Management Training, Self-Care / ADL      AM-PAC Score        AM-[facility] Inpatient Daily Activity Raw Score: 20  AM-PAC Inpatient ADL T-Scale Score : 42.03  ADL Inpatient CMS 0-100% Score: 38.32  ADL Inpatient CMS G-Code Modifier : CJ    Goals  Short term goals  Time Frame for Short term goals: Discharge  Short term goal 1: Mod I for functional mobility w/RW for ADL transfers  Short term goal 2: Mod I for dynamic functional mobility w/RW for ADL/IADL activity  Short term goal 3: Min A for LB bathing w/AE as needed  Short term goal 4: Min A for LB dressing with AE as needed  Patient Goals   Patient goals : \"To stand up straight within a week & be back in the saddle, running again, bicycling. \"       Therapy Time   Individual Concurrent Group Co-treatment   Time In 7314         Time Out 1017         Minutes 40               Timed Code Treatment Minutes:  25 Minutes    Total Treatment Minutes: [address], [address], OTR/L, [id number]

## 2019-04-01 NOTE — FLOWSHEET NOTE
PICC line education:    -Risks  -Benefits  -Alternatives  -Procedure    Discussed the above with patient, verbalized understanding, answered all questions. Provided with information on PICC care to review. PICC tip verified via [employer] (Ok to use). Reported off to patient's  Nurse Royal Krueger RN.

## 2019-04-01 NOTE — PROGRESS NOTES
Review of the chart reveals WBC is 27.0 today -down from 30.2 on Saturday. BC drawn on 3/30 are negative to date. Dr. Jostin Jackson, ID MD aware and requested PICC placement. Will attempt placement today.

## 2019-04-01 NOTE — PROGRESS NOTES
4/1/2019 0700  Gross per 24 hour   Intake 2375 ml   Output 645 ml   Net 1730 ml      Wt Readings from Last 3 Encounters:   04/01/19 191 lb 9.6 oz (86.9 kg)   03/28/19 180 lb (81.6 kg)   01/28/19 184 lb 8 oz (83.7 kg)       General appearance:  Appears comfortable  Eyes: Sclera clear. Pupils equal.  ENT: Moist oral mucosa. Trachea midline, no adenopathy. Cardiovascular: Regular rhythm, normal S1, S2. No murmur. No edema in lower extremities  Respiratory: Not using accessory muscles. Good inspiratory effort. Clear to auscultation bilaterally, no wheeze or crackles. GI: Abdomen soft, no tenderness, not distended, normal bowel sounds  Musculoskeletal: No cyanosis in digits, neck supple  Neurology: CN 2-12 grossly intact. No speech or motor deficits  Psych: Normal affect. Alert and oriented in time, place and person  Skin: Warm, dry, normal turgor    Labs and Tests:  CBC:   Recent Labs     03/30/19 1708 03/31/19 0524 04/01/19  0011 04/01/19  0434   WBC 30.2* 28.4*  --  27.0*   HGB 13.4* 11.8* 11.1* 11.2*    285  --  322     BMP:    Recent Labs     03/30/19 1708 03/31/19 0524 04/01/19  0434   * 134* 135*   K 3.7 3.8 3.9   CL 94* 103 105   CO2 21 19* 21   BUN 27* 26* 18   CREATININE 1.2 1.0 0.8   GLUCOSE 126* 104* 108*     Hepatic:   Recent Labs     03/30/19 1708 03/31/19  0524   AST 83* 50*   ALT 63* 48*   BILITOT 2.2* 1.9*   ALKPHOS 249* 230*       Discussed care with family and patient             Spent 30  minutes with patient and family at bedside and on unit reviewing medical records and labs, spent greater than 50% time counseling patient and family on diagnosis and plan   Problem List  Active Problems:    Psoas abscess ([address]    Leukocytosis    Fever  Resolved Problems:    * No resolved hospital problems. *       Assessment & Plan:   1. Psoas abscess  - s/p for IR guided prec drainage   - still with significant drainage  - culture growing stap beata.  Await further spciation  - wbc is up today  - abx adjusted to vanc and cefepime for now  - await id recs   - pain control   - follow up on cultures.      Sepsis  - POA  - as noted above    Hx of prostate cancer  - s/p prostatectomy  - per uruology    Chronic cough  - ongoing for years  - likely allergic   - no signs of infection  - we have started ppi for possible refulx  - but likely needs outpateint workup   - had had normal pft     Hyponatremia  - improved      CAD  -stable       Diet: DIET GENERAL;  Code:Full Code  DVT PPX lovenox       Brain Perla MD   4/1/2019 10:12 AM

## 2019-04-01 NOTE — PROGRESS NOTES
[address] Received home care referral. Requested to check home infusion benefits. Referral to [employer] [phone] to run benefits per payer.

## 2019-04-02 LAB
ALBUMIN SERPL-MCNC: 2.1 G/DL (ref 3.4–5)
ALP BLD-CCNC: 285 U/L (ref 40–129)
ALT SERPL-CCNC: 51 U/L (ref 10–40)
ANION GAP SERPL CALCULATED.3IONS-SCNC: 8 MMOL/L (ref 3–16)
AST SERPL-CCNC: 75 U/L (ref 15–37)
BANDED NEUTROPHILS RELATIVE PERCENT: 5 % (ref 0–7)
BASOPHILS ABSOLUTE: 0 K/UL (ref 0–0.2)
BASOPHILS RELATIVE PERCENT: 0 %
BILIRUB SERPL-MCNC: 0.8 MG/DL (ref 0–1)
BILIRUBIN DIRECT: 0.5 MG/DL (ref 0–0.3)
BILIRUBIN, INDIRECT: 0.3 MG/DL (ref 0–1)
BUN BLDV-MCNC: 11 MG/DL (ref 7–20)
CALCIUM SERPL-MCNC: 7.6 MG/DL (ref 8.3–10.6)
CHLORIDE BLD-SCNC: 105 MMOL/L (ref 99–110)
CO2: 23 MMOL/L (ref 21–32)
CREAT SERPL-MCNC: 0.7 MG/DL (ref 0.8–1.3)
DOHLE BODIES: PRESENT
EOSINOPHILS ABSOLUTE: 0.6 K/UL (ref 0–0.6)
EOSINOPHILS RELATIVE PERCENT: 3 %
GFR AFRICAN AMERICAN: >60
GFR NON-AFRICAN AMERICAN: >60
GLUCOSE BLD-MCNC: 125 MG/DL (ref 70–99)
HCT VFR BLD CALC: 32.1 % (ref 40.5–52.5)
HEMOGLOBIN: 10.8 G/DL (ref 13.5–17.5)
LYMPHOCYTES ABSOLUTE: 0.4 K/UL (ref 1–5.1)
LYMPHOCYTES RELATIVE PERCENT: 2 %
MCH RBC QN AUTO: 28.2 PG (ref 26–34)
MCHC RBC AUTO-ENTMCNC: 33.5 G/DL (ref 31–36)
MCV RBC AUTO: 84.2 FL (ref 80–100)
METAMYELOCYTES RELATIVE PERCENT: 1 %
MONOCYTES ABSOLUTE: 0.8 K/UL (ref 0–1.3)
MONOCYTES RELATIVE PERCENT: 4 %
NEUTROPHILS ABSOLUTE: 18.3 K/UL (ref 1.7–7.7)
NEUTROPHILS RELATIVE PERCENT: 85 %
PDW BLD-RTO: 14.4 % (ref 12.4–15.4)
PLATELET # BLD: 304 K/UL (ref 135–450)
PMV BLD AUTO: 7.7 FL (ref 5–10.5)
POTASSIUM SERPL-SCNC: 3.8 MMOL/L (ref 3.5–5.1)
RBC # BLD: 3.81 M/UL (ref 4.2–5.9)
SODIUM BLD-SCNC: 136 MMOL/L (ref 136–145)
TOTAL PROTEIN: 5.1 G/DL (ref 6.4–8.2)
TOXIC GRANULATION: PRESENT
WBC # BLD: 20.1 K/UL (ref 4–11)

## 2019-04-02 PROCEDURE — APPNB30 APP NON BILLABLE TIME 0-30 MINS: Performed by: NURSE PRACTITIONER

## 2019-04-02 PROCEDURE — 2580000003 HC RX 258: Performed by: INTERNAL MEDICINE

## 2019-04-02 PROCEDURE — 6370000000 HC RX 637 (ALT 250 FOR IP): Performed by: NURSE PRACTITIONER

## 2019-04-02 PROCEDURE — 6370000000 HC RX 637 (ALT 250 FOR IP): Performed by: PHYSICIAN ASSISTANT

## 2019-04-02 PROCEDURE — 6360000002 HC RX W HCPCS: Performed by: NURSE PRACTITIONER

## 2019-04-02 PROCEDURE — 80048 BASIC METABOLIC PNL TOTAL CA: CPT

## 2019-04-02 PROCEDURE — 85025 COMPLETE CBC W/AUTO DIFF WBC: CPT

## 2019-04-02 PROCEDURE — APPSS15 APP SPLIT SHARED TIME 0-15 MINUTES: Performed by: NURSE PRACTITIONER

## 2019-04-02 PROCEDURE — 99232 SBSQ HOSP IP/OBS MODERATE 35: CPT | Performed by: SURGERY

## 2019-04-02 PROCEDURE — 2060000000 HC ICU INTERMEDIATE R&B

## 2019-04-02 PROCEDURE — 99233 SBSQ HOSP IP/OBS HIGH 50: CPT | Performed by: INTERNAL MEDICINE

## 2019-04-02 PROCEDURE — 6370000000 HC RX 637 (ALT 250 FOR IP): Performed by: HOSPITALIST

## 2019-04-02 PROCEDURE — 2580000003 HC RX 258: Performed by: NURSE PRACTITIONER

## 2019-04-02 PROCEDURE — 6360000002 HC RX W HCPCS: Performed by: INTERNAL MEDICINE

## 2019-04-02 PROCEDURE — 80076 HEPATIC FUNCTION PANEL: CPT

## 2019-04-02 RX ORDER — LANOLIN ALCOHOL/MO/W.PET/CERES
6 CREAM (GRAM) TOPICAL NIGHTLY PRN
Status: DISCONTINUED | OUTPATIENT
Start: 2019-04-02 | End: 2019-04-07 | Stop reason: HOSPADM

## 2019-04-02 RX ADMIN — Medication 3 G: at 20:09

## 2019-04-02 RX ADMIN — BENZONATATE 100 MG: 100 CAPSULE ORAL at 00:34

## 2019-04-02 RX ADMIN — SODIUM CHLORIDE: 9 INJECTION, SOLUTION INTRAVENOUS at 16:58

## 2019-04-02 RX ADMIN — BENZONATATE 100 MG: 100 CAPSULE ORAL at 16:58

## 2019-04-02 RX ADMIN — ATORVASTATIN CALCIUM 10 MG: 10 TABLET, FILM COATED ORAL at 20:08

## 2019-04-02 RX ADMIN — ENOXAPARIN SODIUM 40 MG: 40 INJECTION SUBCUTANEOUS at 08:23

## 2019-04-02 RX ADMIN — Medication 10 ML: at 08:27

## 2019-04-02 RX ADMIN — PANTOPRAZOLE SODIUM 40 MG: 40 TABLET, DELAYED RELEASE ORAL at 05:27

## 2019-04-02 RX ADMIN — GUAIFENESIN AND DEXTROMETHORPHAN 5 ML: 100; 10 SYRUP ORAL at 05:33

## 2019-04-02 RX ADMIN — SODIUM CHLORIDE: 9 INJECTION, SOLUTION INTRAVENOUS at 06:31

## 2019-04-02 RX ADMIN — BENZONATATE 100 MG: 100 CAPSULE ORAL at 08:22

## 2019-04-02 RX ADMIN — ASPIRIN 81 MG 81 MG: 81 TABLET ORAL at 08:23

## 2019-04-02 RX ADMIN — Medication 3 G: at 13:26

## 2019-04-02 RX ADMIN — FLUTICASONE PROPIONATE 1 SPRAY: 50 SPRAY, METERED NASAL at 08:25

## 2019-04-02 RX ADMIN — Medication 3 G: at 05:27

## 2019-04-02 RX ADMIN — SENNOSIDES AND DOCUSATE SODIUM 1 TABLET: 8.6; 5 TABLET ORAL at 20:08

## 2019-04-02 RX ADMIN — MELATONIN TAB 3 MG 6 MG: 3 TAB at 20:44

## 2019-04-02 RX ADMIN — Medication 10 ML: at 08:24

## 2019-04-02 RX ADMIN — CETIRIZINE HYDROCHLORIDE 10 MG: 10 TABLET, FILM COATED ORAL at 08:23

## 2019-04-02 RX ADMIN — GUAIFENESIN AND DEXTROMETHORPHAN 5 ML: 100; 10 SYRUP ORAL at 14:27

## 2019-04-02 ASSESSMENT — ENCOUNTER SYMPTOMS
DIARRHEA: 0
EYE DISCHARGE: 0
SORE THROAT: 0
SHORTNESS OF BREATH: 0
NAUSEA: 0
COUGH: 0
ABDOMINAL PAIN: 0
BACK PAIN: 0
CONSTIPATION: 0
EYE REDNESS: 0
RHINORRHEA: 0
WHEEZING: 0
TROUBLE SWALLOWING: 0

## 2019-04-02 NOTE — PROGRESS NOTES
Urology Progress Note  [facility]    Provider: Tiara Pelayo MD Patient ID:  Admission Date: 3/30/2019 Name: Mike Abreu Date: 3/30/2019 MRN: [id number]   Patient Location: [facility]30/[id number] : 1950  Attending: Roby Crouch MD Date of Service: 2019  PCP: John Fournier DO     Diagnoses:  Lymphocele with infection  Prostate cancer    Assessment/Plan:  -monitor drain, repeat imaging to ensure abscess collection resolved, possible abscessogram. WBC improving.   -will follow. The patient had a chance to ask questions which were answered. he understands the above plan. Subjective:   Edy Wall is a 79 y.o. male. He was seen and examined this morning. Today we discussed his drain, prostate cancer and hospital course. He had a chance to ask questions. We discussed that this fluid collection is directly related to his prostate surgery. Objective:   Vitals:  Vitals:    19 1230   BP: 133/71   Pulse: 58   Resp: 16   Temp: 98 °F (36.7 °C)   SpO2: 98%       Intake/Output Summary (Last 24 hours) at 2019 1508  Last data filed at 2019 1440  Gross per 24 hour   Intake 10 ml   Output 685 ml   Net -675 ml     Physical Exam:  Gen: Alert and oriented x3, no acute distress  CV: Regular rate   Resp: unlabored respirations  Abd: Soft, non-distended, non-tender, no masses  Ext: no peripheral edema noted, moves upper and lower extremities spontaneously  Skin: warm and well perfused, no rashes noted on the face, or arms.      Labs:  Lab Results   Component Value Date    WBC 20.1 (H) 2019    HGB 10.8 (L) 2019    HCT 32.1 (L) 2019    MCV 84.2 2019     2019     Lab Results   Component Value Date    CREATININE 0.7 (L) 2019    BUN 11 2019     2019    K 3.8 2019     2019    CO2 23 2019       Tiara Pelayo MD  2019

## 2019-04-02 NOTE — DISCHARGE INSTR - COC
Prostate cancer ([facility] C61    Psoas abscess ([facility] K68.12    Leukocytosis D72.829    Fever R50.9    Sepsis due to methicillin susceptible Staphylococcus aureus (HCC) A41.01    History of prostate cancer Z85.46    Status post prostatectomy Z90.79    Coronary artery disease due to lipid rich plaque I25.10, I25.83    Overweight D18.8    Complicated UTI (urinary tract infection) N39.0    Receiving intravenous antibiotic treatment as outpatient Z79.2    Weight loss counseling, encounter for Z71.3       Isolation/Infection:   Isolation          No Isolation            Nurse Assessment:  Last Vital Signs: BP (!) 149/71   Pulse 59   Temp 97.7 °F (36.5 °C) (Temporal)   Resp 16   Ht 5' 8\" (1.727 m)   Wt 197 lb 11.2 oz (89.7 kg)   SpO2 100%   BMI 30.06 kg/m²     Last documented pain score (0-10 scale): Pain Level: 0  Last Weight:   Wt Readings from Last 1 Encounters:   04/04/19 197 lb 11.2 oz (89.7 kg)     Mental Status:  oriented and alert    IV Access:  - PICC - site  R Basilic, insertion date: 04/01/2019    Nursing Mobility/ADLs:  Walking   Independent  Transfer  Independent  Bathing  Independent  Dressing  Independent  [address]  Independent  Med Delivery   whole    Wound Care Documentation and Therapy:    Monitor TREVIN drain contents and site (patient & family to empty TREVIN contents twice daily, and as needed, and record drainage amounts)        Elimination:  Continence:   · Bowel:  Yes  · Bladder: Yes  Urinary Catheter: None   Colostomy/Ileostomy/Ileal Conduit: No       Date of Last BM: 04/07/2019    Intake/Output Summary (Last 24 hours) at 4/4/2019 1144  Last data filed at 4/4/2019 1100  Gross per 24 hour   Intake --   Output 1080 ml   Net -1080 ml     I/O last 3 completed shifts:  In: -   Out: 1310 [Urine:1050; Drains:260]    Safety Concerns:     None    Impairments/Disabilities:      None    Nutrition Therapy:  Current Nutrition Therapy:   - Oral Diet: ciprofloxacin will be April 16, 2019 and stop date for IV cefazolin will be April 26, 2019     Lab monitoring: CBC, Chem 12, ESR, CRP once a week, to be       collected every Monday or Tuesday morning until the patient is on IV          antibiotics. Fax weekly lab results to Tosin Pino MD's office at        [phone]       ** Please notify Tosin Pino MD's office with any change in patient's        status, transfer out of facility or to hospital by calling [phone]           Outpatient Follow up: Follow-up with Tosin Pino MD in [facility] or [facility] ID clinic in 4 weeks. Physician Signature:  Electronically signed by Tosin Pino MD on                                                               4/2/19 at 2:25 PM           Physician Certification: I certify the above information and transfer of Trinidad Saldana  is necessary for the continuing treatment of the diagnosis listed and that he requires [address] for less 30 days.      Update Admission H&P: No change in H&P    PHYSICIAN SIGNATURE:  Electronically signed by Nery Jacob MD on 4/6/19 at 3:27 PM

## 2019-04-02 NOTE — PLAN OF CARE
Problem: Falls - Risk of:  Goal: Will remain free from falls  Description  Will remain free from falls  Outcome: Ongoing  Note:   Pt remains free from falls and accidental injury at this time. Bed locked and in lowest position. Floor free from obstacles and pt encouraged to call as needed. Using call light appropriately. Will continue to monitor. Problem: Cardiovascular  Goal: Hemodynamic stability  Outcome: Ongoing  Note:   Vitals:    04/02/19 0028   BP: (!) 141/73   Pulse: 71   Resp: 18   Temp: 97.8 °F (36.6 °C)   SpO2: 96%     Will continue to monitor. Problem: Pain:  Goal: Pain level will decrease  Description  Pain level will decrease  Outcome: Ongoing  Note:   Pt denies having pain this shift. Will continue to monitor. Problem: Respiratory  Goal: O2 Sat > 90%  Outcome: Ongoing  Note:   Pt's O2 sats remain > 90% on RA. Will continue to monitor.

## 2019-04-02 NOTE — PROGRESS NOTES
Infectious Diseases   Progress Note      Admission Date: 3/30/2019  Hospital Day: Hospital Day: 4  Attending: Robi Castro MD  Date of service: 4/2/2019    Presenting complaint:   Chief Complaint   Patient presents with    Hip Pain     prostate removed 1/28th, patient reporting everything was great after surgery, then this week he thinks he must have caught the flu where hes had high fevers, and his left hip has been hurting so severely he hasnt been able to walk.         Chief complaint/ Reason for consult: The patient was seen today for the following:    · Sepsis with high fever and marked leukocytosis   · Complicated left pelvic/psoas abscess status postoperative ileus drainage with [address] [place] [address] drain placement - culture positive for MSSA  · Left hip pain  · Mixed hyperlipidemia    Microbiology:        I have reviewed all available micro lab data and cultures    · Blood culture (2/2) - collected on 3/30/2019: Negative  · Body fluid (left pelvic abscess fluid ) culture - collected on 3/30/2019: MSSA    Staphylococcus aureus (1)     Antibiotic Interpretation JING Status    ceFAZolin Sensitive <=4 mcg/mL     ciprofloxacin Intermediate 2 mcg/mL     clindamycin Sensitive <=0.5 mcg/mL     erythromycin Sensitive <=0.5 mcg/mL     oxacillin Sensitive 0.5 mcg/mL     tetracycline Sensitive <=4 mcg/mL     trimethoprim-sulfamethoxazole Sensitive <=0.5/9.5 mcg/mL           · Urine culture  - collected on 3/29/2019: Klebsiella    Klebsiella oxytoca (1)     Antibiotic Interpretation JING Status    amoxicillin-clavulanate Sensitive <=8/4 mcg/mL     ampicillin Resistant >16 mcg/mL     ceFAZolin Resistant 8 mcg/mL     cefepime Sensitive <=2 mcg/mL     cefotaxime Sensitive <=2 mcg/mL     cefTRIAXone Sensitive <=1 mcg/mL     cefuroxime Sensitive <=4 mcg/mL     ciprofloxacin Sensitive <=1 mcg/mL     gentamicin Sensitive <=4 mcg/mL     meropenem Sensitive <=1 mcg/mL     nitrofurantoin Intermediate 64 mcg/mL piperacillin-tazobactam Sensitive <=16 mcg/mL     trimethoprim-sulfamethoxazole Sensitive <=2/38 mcg/mL           Problem list:       Patient Active Problem List   Diagnosis Code    Chest pain on exertion R07.9    Exertional chest pain R07.9    ACS (acute coronary syndrome) ([facility]) I24.9    Abnormal stress test R94.39    Coronary artery disease involving native coronary artery of native heart without angina pectoris I25.10    S/P CABG x 5 Z95.1    Elevated PSA R97.20    Mixed hyperlipidemia E78.2    Prostate cancer ([facility] C61    Psoas abscess ([facility]) K68.12    Leukocytosis D72.829    Fever R50.9    Sepsis due to methicillin susceptible Staphylococcus aureus ([facility]) A41.01    History of prostate cancer Z85.46    Status post prostatectomy Z90.79    Coronary artery disease due to lipid rich plaque I25.10, I25.83    Overweight E66.3         Assessment:     The patient is a 79 y.o. old male who  has a past medical history of CAD (coronary artery disease), Mixed hyperlipidemia (11/26/2018), and Prostate cancer ([facility] (1/21/2019). with following problems:    · Sepsis with high fever and marked leukocytosis - sepsis resolved  · Complicated left pelvic/psoas abscess status postoperative ileus drainage with [address] [place] [address] drain placement - culture positive for MSSA - covered with IV cefazolin  · Complicated UTI in a male with Klebsiella - this is new  · CA prostate status post prostatectomy on 1/28/19  · Left hip pain  · Mixed hyperlipidemia  · Coronary artery disease  · Overweight due to excess calorie intake : Body mass index is 28.69 kg/m². Discussion:      His left psoas abscess cultures are positive only for MSSA so far. Susceptibility reviewed. I had switched him to IV cefazolin yesterday. I had ordered a PICC line yesterday. His WBC count is slowly improving and is 20,100 today.     Urine culture did grow Klebsiella, that was ciprofloxacin susceptible    Plan:     Diagnostic Workup:    · Continue to follow  fever curve, WBC count and blood cultures  · Follow up on liver and renal function    Antimicrobials:    · Will order PO ciprofloxacin 500 mg twice daily for Klebsiella UTI coverage   · Continue IV cefazolin 2 g every 8 hours for MSSA abscess  · PICC line care  · He will need IV cefazolin therapy at home   · TREVIN drain site care  · Discussed all above with patient and RN      MALINDA has been updated with infusion orders as follows: Out-patient antibiotic therapy (OPAT)/ Antibiotic Infusion orders          Diagnosis: MSSA left inguinal abscess, complicated Klebsiella UTI       Organism/ culture: See above     Name and dose of Antimicrobial: IV cefazolin 2 g every 8 hours, by mouth ciprofloxacin 500 mg every 12 hours     Antimicrobial start date: calculated from 4/1/19     Antimicrobial completion date planned: stop date for oral ciprofloxacin will be April 16, 2019 and stop date for IV cefazolin will be April 26, 2019     Lab monitoring: CBC, Chem 12, ESR, CRP once a week, to be       collected every Monday or Tuesday morning until the patient is on IV          antibiotics. Fax weekly lab results to Pk Solorio MD's office at        [phone]       ** Please notify Pk Solorio MD's office with any change in patient's        status, transfer out of facility or to hospital by calling [phone]           Outpatient Follow up: Follow-up with Pk Solorio MD in [facility] or [facility] ID clinic in 4 weeks. Physician Signature:  Electronically signed by Pk Solorio MD on                                                               4/2/19 at 2:25 PM           Drug Monitoring:    · Continue monitoring for antibiotic toxicity as follows: CMP, QTc interval  · Continue to watch for following: new or worsening fever, new hypotension, hives, lip swelling and redness or purulence at vascular access sites.      I/v access Management:    · Continue to monitor i.v access sites for erythema, induration, discharge or tenderness. · As always, continue efforts to minimize tubes/lines/drains as clinically appropriate to reduce chances of line associated infections. Patient education and counseling:      · The patient was educated in detail about the side-effects of various antibiotics and things to watch for like new rashes, lip swelling, severe reaction, worsening diarrhea, break through fever etc.  · Discussed patient's condition and what to expect. All of the patient's questions were addressed in a satisfactory manner and patient verbalized understanding all instructions. TIME SPENT TODAY:     - Spent over  37  minutes on visit (including interval history, physical exam, review of data including labs, cultures, imaging, development and implementation of treatment plan and coordination of complex care). - Over 50% of time spent with patient face to face on counseling and education. Thank you for involving me in the care of your patient. I will continue to follow. If you have anyadditional questions, please do not hesitate to contact me. Subjective: Interval history: Patient was seen and examined at bedside. Interval history was obtained. Is tired. He is on IV cefazolin. He is tolerating antibiotics okay. He is afebrile. Still has a ongoing drainage in the TREVIN drain     REVIEW OF SYSTEMS:  *    Review of Systems   Constitutional: Positive for fatigue. Negative for chills, diaphoresis and fever. HENT: Negative for ear discharge, ear pain, rhinorrhea, sore throat and trouble swallowing. Eyes: Negative for discharge and redness. Respiratory: Negative for cough, shortness of breath and wheezing. Cardiovascular: Negative for chest pain and leg swelling. Gastrointestinal: Negative for abdominal pain, constipation, diarrhea and nausea. Endocrine: Negative for polyuria.    Genitourinary: Negative for dysuria, flank pain, frequency, hematuria and urgency. Musculoskeletal: Negative for back pain and myalgias. Skin: Negative for rash. Neurological: Negative for dizziness, seizures and headaches. Hematological: Does not bruise/bleed easily. Psychiatric/Behavioral: Negative for hallucinations and suicidal ideas. All other systems reviewed and are negative. Past Medical History: All past medical history reviewed today. Past Medical History:   Diagnosis Date    CAD (coronary artery disease)     Mixed hyperlipidemia 11/26/2018    Prostate cancer ([facility] 1/21/2019       Past Surgical History: All past surgical history was reviewed today. Past Surgical History:   Procedure Laterality Date    CARDIAC CATHETERIZATION  12/28/2015    Dr Analy Horta    COLONOSCOPY  1-10-11    CORONARY ARTERY BYPASS GRAFT  12.30.2015    [employer]    DENTAL SURGERY      PROSTATECTOMY N/A 1/28/2019    ROBOTIC LAPAROSCOPIC RADICAL PROSTATECTOMY WITH BILATERAL LYMPH NODE DISSECTION AND RIGHT SIDE NERVE SPARING performed by Edy Lawson MD at [address]         Immunization History: All immunization history was reviewed by me today. There is no immunization history for the selected administration types on file for this patient. Family History: All family history was reviewed today. Problem Relation Age of Onset    Heart Disease Father     Stroke Mother     Other Sister        Objective:       PHYSICAL EXAM:      Vitals:   Vitals:    04/02/19 0531 04/02/19 0659 04/02/19 0800 04/02/19 1230   BP: 125/68  (!) 141/75 133/71   Pulse: 68  66 58   Resp: 18  18 16   Temp: 98.5 °F (36.9 °C)  98.5 °F (36.9 °C) 98 °F (36.7 °C)   TempSrc: Temporal  Temporal Temporal   SpO2: 93%  96% 98%   Weight:  188 lb 11.2 oz (85.6 kg)     Height:           Physical Exam   Constitutional: He is oriented to person, place, and time. He appears well-developed. HENT:   Head: Normocephalic and atraumatic.    Mouth/Throat: Oropharynx is clear and moist. No oropharyngeal exudate. Eyes: Pupils are equal, round, and reactive to light. Conjunctivae and EOM are normal. Right eye exhibits no discharge. Left eye exhibits no discharge. No scleral icterus. Neck: Normal range of motion. Neck supple. Cardiovascular: Normal rate and regular rhythm. Exam reveals no friction rub. No murmur heard. Pulmonary/Chest: No stridor. No respiratory distress. He has no wheezes. He has no rales. Abdominal: Soft. Bowel sounds are normal. There is no tenderness. There is no rebound and no guarding. TREVIN drain noted with ongoing purulence   Musculoskeletal: Normal range of motion. He exhibits no edema or tenderness. Lymphadenopathy:     He has no cervical adenopathy. He has no axillary adenopathy. Neurological: He is alert and oriented to person, place, and time. He exhibits normal muscle tone. Skin: Skin is warm and dry. No rash noted. He is not diaphoretic. No erythema. Psychiatric: He has a normal mood and affect. His behavior is normal.   Nursing note and vitals reviewed. Lines: All vascular access sites are healthy with no local erythema, discharge or tenderness. Intake and output:    I/O last 3 completed shifts: In: 10 [I.V.:10]  Out: 645 [Urine:450; Drains:195]    Lab Data:   All available labs and old records have been reviewed by me.     CBC:  Recent Labs     03/31/19 0524 04/01/19 0011 04/01/19 0434 04/02/19 0612   WBC 28.4*  --  27.0* 20.1*   RBC 4.20  --  3.93* 3.81*   HGB 11.8* 11.1* 11.2* 10.8*   HCT 35.4* 33.3* 33.3* 32.1*     --  322 304   MCV 84.3  --  84.7 84.2   MCH 28.1  --  28.4 28.2   MCHC 33.4  --  33.5 33.5   RDW 14.1  --  14.1 14.4   BANDSPCT 10*  --  6 5        BMP:  Recent Labs     03/31/19 0524 04/01/19  0434 04/02/19  0612   * 135* 136   K 3.8 3.9 3.8    105 105   CO2 19* 21 23   BUN 26* 18 11   CREATININE 1.0 0.8 0.7*   CALCIUM 8.1* 8.2* 7.6*   GLUCOSE 104* 108* 125*        Hepatic Function Panel:   Lab Results   Component Value Date    ALKPHOS 285 04/02/2019    ALT 51 04/02/2019    AST 75 04/02/2019    PROT 5.1 04/02/2019    BILITOT 0.8 04/02/2019    BILIDIR 0.5 04/02/2019    IBILI 0.3 04/02/2019    LABALBU 2.1 04/02/2019       CPK: No results found for: CKTOTAL  ESR:   Lab Results   Component Value Date    SEDRATE 55 (H) 04/01/2019     CRP:   Lab Results   Component Value Date    .7 (H) 04/01/2019           Imaging: All pertinent images and reports for the current visit were reviewed by me during this visit. CT ABSCESS DRAINAGE W CATH PLACEMENT S&I   Final Result   Technically successful CT-guided placement of a 12 Polish locking pigtail   drainage catheter into the deep left iliopsoas fluid collection. CT ABDOMEN PELVIS W IV CONTRAST Additional Contrast? None   Final Result   1. Large lobular fluid collection along the left pelvic sidewall involving   the distal psoas and iliacus muscles measuring approximately 16.2 x 8.1 x 4.1   cm with mild adjacent stranding. This may represent an abscess or liquefying   hematoma. 2. Status post prostatectomy. Medications: All current and past medications were reviewed.      cetirizine  10 mg Oral Daily    fluticasone  1 spray Each Nare Daily    ceFAZolin  3 g Intravenous Q8H    lidocaine 1 % injection  5 mL Intradermal Once    sodium chloride flush  10 mL Intravenous 2 times per day    pantoprazole  40 mg Oral QAM AC    aspirin  81 mg Oral Daily    atorvastatin  10 mg Oral Nightly    sennosides-docusate sodium  1 tablet Oral BID    sodium chloride flush  10 mL Intravenous 2 times per day    enoxaparin  40 mg Subcutaneous Daily        sodium chloride 100 mL/hr at 04/02/19 0631       sodium chloride flush, guaiFENesin-dextromethorphan, benzonatate, morphine, sodium chloride flush, ondansetron, potassium chloride **OR** potassium alternative oral replacement **OR** potassium chloride, magnesium sulfate, acetaminophen, oxyCODONE-acetaminophen    Current antibiotics: All antibiotics and their doses were reviewed by me today    Recent Abx Admin                   ceFAZolin (ANCEF) 3 g in dextrose 5 % 100 mL IVPB (g) 3 g New Bag 04/02/19 1326     3 g New Bag  0527     3 g New Bag 04/01/19 2017                Known drug Allergies: All allergies were reviewed and updated    No Known Allergies        Please note that this chart was generated using Dragon dictation software. Although every effort was made to ensure the accuracy of this automated transcription, some errors in transcription may have occurred inadvertently. If you may need any clarification, please do not hesitate to contact me through EP[employer] or at the phone number provided below with my electronic signature.     Baldomero Garduno MD, MPH  4/2/2019, 2:21 PM  [facility] Infectious Disease   Office: [phone]  Fax: [phone]  Tuesday AM clinic:   [address] [place] [address] 120  Thursday AM [id number] [facility]

## 2019-04-02 NOTE — PROGRESS NOTES
BMP:    Recent Labs     03/31/19  0524 04/01/19  0434 04/02/19  0612   * 135* 136   K 3.8 3.9 3.8    105 105   CO2 19* 21 23   BUN 26* 18 11   CREATININE 1.0 0.8 0.7*   GLUCOSE 104* 108* 125*     Hepatic:   Recent Labs     03/30/19  1708 03/31/19  0524 04/02/19  0612   AST 83* 50* 75*   ALT 63* 48* 51*   BILITOT 2.2* 1.9* 0.8   ALKPHOS 249* 230* 285*     Amylase: No results for input(s): AMYLASE in the last 72 hours. Lipase: No results for input(s): LIPASE in the last 72 hours. Mag:      Recent Labs     03/31/19 0524 04/01/19  0434   MG 2.00 2.10      Phos:     Recent Labs     03/31/19  0524 04/01/19  0434   PHOS 2.4* 2.7      Coags:   Recent Labs     03/31/19 0524   INR 1.25*   APTT 25.6*       Cultures:  Anaerobic culture  Recent Labs     03/31/19  1015   Barbaraiva Michael Further report to follow       Blood culture  Recent Labs     03/30/19  1850   BC No Growth to date. Any change in status will be called. Blood culture 2  Recent Labs     03/30/19 2012   BLOODCULT2 No Growth to date. Any change in status will be called. Body fluid culture  Recent Labs     03/30/19 2012   BLOODCULT2 No Growth to date. Any change in status will be called. Surgical culture  No results for input(s): CXSURG in the last 72 hours. Fecal occult  No results for input(s): OCCULTBLDFEC in the last 72 hours. Gram stain  Recent Labs     03/31/19  1015   LABGRAM 1+ Epithelial Cells  3+ WBC's (Polymorphonuclear)  3+ Gram positive cocci  *       Stool culture 1  No results for input(s): CXST in the last 72 hours. Stool culture 2  No results for input(s): STOOLCULT2 in the last 72 hours.     Urine culture  Recent Labs     03/30/19  1708   LABURIN No growth at 18-36 hours       Wound abscess  Recent Labs     03/31/19  1015   WNDABS Moderate growth  PBP2= Negative         Pathology:  NA    Imaging:  I have personally reviewed the following films:    Xr Hip Left (2-3 Views)    Result Date: in sterile fashion. 1% lidocaine local anesthesia was used. Using CT guidance, a 5 Kittitian needle sheath was advanced into the inferior iliopsoas/left pelvic sidewall collection. Wire was advanced into the collection. The tract was dilated and a 12 Kittitian locking pigtail drainage catheter was placed into the collection. The catheter was placed to bulb suction. The catheter was secured to the skin using suture. The site was dressed and bandaged in sterile fashion. The patient tolerated procedure well without immediate complication. FINDINGS: 90 mL of cloudy tan fluid was aspirated and sent for culture per request ordering clinician. Technically successful CT-guided placement of a 12 Kittitian locking pigtail drainage catheter into the deep left iliopsoas fluid collection. Ct Abdomen Pelvis W Iv Contrast Additional Contrast? None    Result Date: 3/30/2019  EXAMINATION: CT OF THE ABDOMEN AND PELVIS WITH CONTRAST 3/30/2019 6:09 pm TECHNIQUE: CT of the abdomen and pelvis was performed with the administration of intravenous contrast. Multiplanar reformatted images are provided for review. Dose modulation, iterative reconstruction, and/or weight based adjustment of the mA/kV was utilized to reduce the radiation dose to as low as reasonably achievable. COMPARISON: None. HISTORY: ORDERING SYSTEM PROVIDED HISTORY: left hip pain. ..include bone window please TECHNOLOGIST PROVIDED HISTORY: Additional Contrast?->None Ordering Physician Provided Reason for Exam: Hip Pain (prostate removed 1/28th, patient reporting everything was great after surgery, then this week he thinks he must have caught the flu where hes had high fevers, and his left hip has been hurting so severely he hasnt been able to walk. ) Acuity: Acute Type of Exam: Initial FINDINGS: Lower Chest: Coronary artery atherosclerotic vascular calcifications. Mild bibasilar atelectasis. Trace left pleural effusion.  Organs: 9 mm hypodensity in the left hepatic

## 2019-04-02 NOTE — PLAN OF CARE
Problem: Pain:  Goal: Pain level will decrease  Description  Pain level will decrease  Outcome: Ongoing  Goal: Control of acute pain  Description  Control of acute pain  Outcome: Ongoing  Goal: Control of chronic pain  Description  Control of chronic pain  Outcome: Ongoing     Problem: Falls - Risk of:  Goal: Will remain free from falls  Description  Will remain free from falls  Outcome: Ongoing  Goal: Absence of physical injury  Description  Absence of physical injury  4/2/2019 1728 by Solo Mcguire RN  Outcome: Ongoing  4/2/2019 1019 by Arron Griffin RN  Outcome: Ongoing  Note:   Patient has remained free of falls and physical injury at the time of this note. Preventative measures in place: bed in lowest position, call light within reach, bed alarm, non skid socks  Patient's ambulatory status: Medium fall risk, calls out appropriately for needs. SBA with walker        Problem: Cardiovascular  Goal: Hemodynamic stability  Outcome: Ongoing     Problem: Respiratory  Goal: No pulmonary complications  Outcome: Ongoing  Goal: O2 Sat > 90%  Outcome: Ongoing  Goal: Supplemental O2 requirements decreased  Outcome: Ongoing  Goal: Agreement to quit smoking  Outcome: Ongoing  Goal: Pneumonia vaccine at discharge as needed  Outcome: Ongoing     Problem: Musculor/Skeletal Functional Status  Goal: Highest potential functional level  Outcome: Ongoing  Goal: Absence of falls  Outcome: Ongoing   VSS. Pt denies pain with rest. With movement he has dull aching pain in left hip. Ambulates with SBA and slow movements. Rhonchi auscultated in Right lobes. Frequent dry non productive cough. Medications given per mar. Pt free from falls at this time, Fall precautions in place, bed in lowest position with brakes locked, call light and personal belongings within reach.

## 2019-04-02 NOTE — PROGRESS NOTES
Hospitalist Progress Note      PCP: Dell Salazar DO    Date of Admission: 3/30/2019    Chief Complaint: Groin pain      Subjective:   Pain is improving. Denies fever, chills. Tolerating PO. Intermittent chronic cough. Medications:  Reviewed    Infusion Medications    sodium chloride 100 mL/hr at 04/02/19 0631     Scheduled Medications    cetirizine  10 mg Oral Daily    fluticasone  1 spray Each Nare Daily    ceFAZolin  3 g Intravenous Q8H    lidocaine 1 % injection  5 mL Intradermal Once    sodium chloride flush  10 mL Intravenous 2 times per day    pantoprazole  40 mg Oral QAM AC    aspirin  81 mg Oral Daily    atorvastatin  10 mg Oral Nightly    sennosides-docusate sodium  1 tablet Oral BID    sodium chloride flush  10 mL Intravenous 2 times per day    enoxaparin  40 mg Subcutaneous Daily     PRN Meds: sodium chloride flush, guaiFENesin-dextromethorphan, benzonatate, morphine, sodium chloride flush, ondansetron, potassium chloride **OR** potassium alternative oral replacement **OR** potassium chloride, magnesium sulfate, acetaminophen, oxyCODONE-acetaminophen      Intake/Output Summary (Last 24 hours) at 4/2/2019 1258  Last data filed at 4/2/2019 0604  Gross per 24 hour   Intake 10 ml   Output 585 ml   Net -575 ml       Exam:    /71   Pulse 58   Temp 98 °F (36.7 °C) (Temporal)   Resp 16   Ht 5' 8\" (1.727 m)   Wt 188 lb 11.2 oz (85.6 kg)   SpO2 98%   BMI 28.69 kg/m²     Gen/overall appearance: Not in acute distress. Alert. Head: Normocephalic, atraumatic  Eyes: EOMI, no scleral icterus  CVS: regular rate and rhythm, Normal S1S2  Pulm: Clear to auscultation bilaterally.  No crackles/wheezes  Gastrointestinal: Soft, nontender, nondistended, no guarding or rebound  Extremities: L groin drain in place, No edema  Neuro: No gross focal deficits noted  Skin: Warm, dry    Labs:   Recent Labs     03/31/19  0524 04/01/19  0011 04/01/19  0434 04/02/19  0612   WBC 28.4*  --  27.0* 20.1*   HGB 11.8* 11.1* 11.2* 10.8*   HCT 35.4* 33.3* 33.3* 32.1*     --  322 304     Recent Labs     03/31/19  0524 04/01/19  0434 04/02/19  0612   * 135* 136   K 3.8 3.9 3.8    105 105   CO2 19* 21 23   BUN 26* 18 11   CREATININE 1.0 0.8 0.7*   CALCIUM 8.1* 8.2* 7.6*   PHOS 2.4* 2.7  --      Recent Labs     03/30/19  1708 03/31/19  0524 04/02/19  0612   AST 83* 50* 75*   ALT 63* 48* 51*   BILIDIR  --  1.4* 0.5*   BILITOT 2.2* 1.9* 0.8   ALKPHOS 249* 230* 285*     Recent Labs     03/31/19  0524   INR 1.25*     No results for input(s): CKTOTAL, TROPONINI in the last 72 hours. Assessment/Plan:    Active Hospital Problems    Diagnosis Date Noted    Sepsis due to methicillin susceptible Staphylococcus aureus ([facility] [address] [A41.01]     History of prostate cancer [Z85.46]     Status post prostatectomy [Z90.79]     Coronary artery disease due to lipid rich plaque [I25.10, I25.83]     Overweight [E66.3]     Leukocytosis [D72.829] 03/31/2019    Fever [R50.9] 03/31/2019    Psoas abscess ([facility] [address] [K68.12] 03/30/2019     Psoas abscess s/p for IR guided prec drain placement. Culture remarkable for staphy  - follow up sensitivities  - drain management per GS  - abx per ID recs; on ancef  - pain control      Sepsis. POA. Now resolved     Hx of prostate cancer s/p prostatectomy  - per uruology     Chronic cough. Ongoing for years.   Likely allergic   - no gross signs of infection  - on empiric PPI  - follows as outpateint     Hyponatremia; now resolved     CAD s/p CABG    DVT Prophylaxis: lovenox  Diet: DIET GENERAL;  Code Status: Full Code    Dispo - Inpt, 1-2 days    Mckenzie Barrios MD

## 2019-04-03 LAB
ANION GAP SERPL CALCULATED.3IONS-SCNC: 9 MMOL/L (ref 3–16)
BANDED NEUTROPHILS RELATIVE PERCENT: 6 % (ref 0–7)
BASOPHILS ABSOLUTE: 0 K/UL (ref 0–0.2)
BASOPHILS RELATIVE PERCENT: 0 %
BUN BLDV-MCNC: 9 MG/DL (ref 7–20)
CALCIUM SERPL-MCNC: 7.7 MG/DL (ref 8.3–10.6)
CHLORIDE BLD-SCNC: 104 MMOL/L (ref 99–110)
CO2: 23 MMOL/L (ref 21–32)
CREAT SERPL-MCNC: 0.7 MG/DL (ref 0.8–1.3)
EOSINOPHILS ABSOLUTE: 0.8 K/UL (ref 0–0.6)
EOSINOPHILS RELATIVE PERCENT: 4 %
GFR AFRICAN AMERICAN: >60
GFR NON-AFRICAN AMERICAN: >60
GLUCOSE BLD-MCNC: 112 MG/DL (ref 70–99)
HCT VFR BLD CALC: 30.8 % (ref 40.5–52.5)
HEMOGLOBIN: 10.2 G/DL (ref 13.5–17.5)
LYMPHOCYTES ABSOLUTE: 1 K/UL (ref 1–5.1)
LYMPHOCYTES RELATIVE PERCENT: 5 %
MCH RBC QN AUTO: 28.1 PG (ref 26–34)
MCHC RBC AUTO-ENTMCNC: 33 G/DL (ref 31–36)
MCV RBC AUTO: 84.9 FL (ref 80–100)
METAMYELOCYTES RELATIVE PERCENT: 1 %
MONOCYTES ABSOLUTE: 0.4 K/UL (ref 0–1.3)
MONOCYTES RELATIVE PERCENT: 2 %
MYELOCYTE PERCENT: 1 %
NEUTROPHILS ABSOLUTE: 17.9 K/UL (ref 1.7–7.7)
NEUTROPHILS RELATIVE PERCENT: 81 %
PDW BLD-RTO: 14.6 % (ref 12.4–15.4)
PLATELET # BLD: 317 K/UL (ref 135–450)
PLATELET SLIDE REVIEW: ADEQUATE
PMV BLD AUTO: 7.8 FL (ref 5–10.5)
POTASSIUM SERPL-SCNC: 3.8 MMOL/L (ref 3.5–5.1)
RBC # BLD: 3.62 M/UL (ref 4.2–5.9)
SLIDE REVIEW: ABNORMAL
SODIUM BLD-SCNC: 136 MMOL/L (ref 136–145)
TOXIC GRANULATION: PRESENT
WBC # BLD: 20.1 K/UL (ref 4–11)

## 2019-04-03 PROCEDURE — APPSS15 APP SPLIT SHARED TIME 0-15 MINUTES: Performed by: NURSE PRACTITIONER

## 2019-04-03 PROCEDURE — 2580000003 HC RX 258: Performed by: INTERNAL MEDICINE

## 2019-04-03 PROCEDURE — 99233 SBSQ HOSP IP/OBS HIGH 50: CPT | Performed by: INTERNAL MEDICINE

## 2019-04-03 PROCEDURE — 99232 SBSQ HOSP IP/OBS MODERATE 35: CPT | Performed by: SURGERY

## 2019-04-03 PROCEDURE — 80048 BASIC METABOLIC PNL TOTAL CA: CPT

## 2019-04-03 PROCEDURE — 6360000002 HC RX W HCPCS: Performed by: NURSE PRACTITIONER

## 2019-04-03 PROCEDURE — 85025 COMPLETE CBC W/AUTO DIFF WBC: CPT

## 2019-04-03 PROCEDURE — 2580000003 HC RX 258: Performed by: NURSE PRACTITIONER

## 2019-04-03 PROCEDURE — 2060000000 HC ICU INTERMEDIATE R&B

## 2019-04-03 PROCEDURE — 6370000000 HC RX 637 (ALT 250 FOR IP): Performed by: HOSPITALIST

## 2019-04-03 PROCEDURE — 6370000000 HC RX 637 (ALT 250 FOR IP): Performed by: NURSE PRACTITIONER

## 2019-04-03 PROCEDURE — APPNB30 APP NON BILLABLE TIME 0-30 MINS: Performed by: NURSE PRACTITIONER

## 2019-04-03 PROCEDURE — 6370000000 HC RX 637 (ALT 250 FOR IP): Performed by: PHYSICIAN ASSISTANT

## 2019-04-03 PROCEDURE — 6360000002 HC RX W HCPCS: Performed by: INTERNAL MEDICINE

## 2019-04-03 RX ADMIN — SENNOSIDES AND DOCUSATE SODIUM 1 TABLET: 8.6; 5 TABLET ORAL at 09:50

## 2019-04-03 RX ADMIN — Medication 10 ML: at 14:25

## 2019-04-03 RX ADMIN — ENOXAPARIN SODIUM 40 MG: 40 INJECTION SUBCUTANEOUS at 09:50

## 2019-04-03 RX ADMIN — Medication 3 G: at 21:22

## 2019-04-03 RX ADMIN — GUAIFENESIN AND DEXTROMETHORPHAN 5 ML: 100; 10 SYRUP ORAL at 06:32

## 2019-04-03 RX ADMIN — CETIRIZINE HYDROCHLORIDE 10 MG: 10 TABLET, FILM COATED ORAL at 09:50

## 2019-04-03 RX ADMIN — PANTOPRAZOLE SODIUM 40 MG: 40 TABLET, DELAYED RELEASE ORAL at 09:50

## 2019-04-03 RX ADMIN — SODIUM CHLORIDE: 9 INJECTION, SOLUTION INTRAVENOUS at 14:25

## 2019-04-03 RX ADMIN — ATORVASTATIN CALCIUM 10 MG: 10 TABLET, FILM COATED ORAL at 21:23

## 2019-04-03 RX ADMIN — SENNOSIDES AND DOCUSATE SODIUM 1 TABLET: 8.6; 5 TABLET ORAL at 21:22

## 2019-04-03 RX ADMIN — ASPIRIN 81 MG 81 MG: 81 TABLET ORAL at 09:50

## 2019-04-03 RX ADMIN — MELATONIN TAB 3 MG 6 MG: 3 TAB at 21:22

## 2019-04-03 RX ADMIN — Medication 3 G: at 06:10

## 2019-04-03 RX ADMIN — FLUTICASONE PROPIONATE 1 SPRAY: 50 SPRAY, METERED NASAL at 09:50

## 2019-04-03 RX ADMIN — GUAIFENESIN AND DEXTROMETHORPHAN 5 ML: 100; 10 SYRUP ORAL at 21:22

## 2019-04-03 RX ADMIN — SODIUM CHLORIDE: 9 INJECTION, SOLUTION INTRAVENOUS at 03:05

## 2019-04-03 RX ADMIN — Medication 3 G: at 14:25

## 2019-04-03 ASSESSMENT — ENCOUNTER SYMPTOMS
EYE REDNESS: 0
COUGH: 0
SORE THROAT: 0
TROUBLE SWALLOWING: 0
WHEEZING: 0
RHINORRHEA: 0
CONSTIPATION: 0
ABDOMINAL PAIN: 0
DIARRHEA: 0
NAUSEA: 0
SHORTNESS OF BREATH: 0
EYE DISCHARGE: 0
BACK PAIN: 0

## 2019-04-03 ASSESSMENT — PAIN SCALES - GENERAL
PAINLEVEL_OUTOF10: 0
PAINLEVEL_OUTOF10: 1

## 2019-04-03 ASSESSMENT — PAIN DESCRIPTION - PAIN TYPE: TYPE: ACUTE PAIN

## 2019-04-03 ASSESSMENT — PAIN DESCRIPTION - LOCATION: LOCATION: HIP

## 2019-04-03 ASSESSMENT — PAIN DESCRIPTION - ORIENTATION: ORIENTATION: LEFT;ANTERIOR

## 2019-04-03 NOTE — PROGRESS NOTES
Reassessment completed. Pt verbalized his need to use the bathroom. BP out of normal range. Pt is back to bed. No sign of distress. Will continue monitoring his BP.

## 2019-04-03 NOTE — PROGRESS NOTES
Urology Progress Note  [facility]    Provider: Keila Hansen MD Patient ID:  Admission Date: 3/30/2019 Name: Dinora Almanza Date: 3/30/2019 MRN: [id number]   Patient Location: [id number] : 1950  Attending: Norma Cade MD Date of Service: 4/3/2019  PCP: Marnie Bernheim, DO     Diagnoses:  Lymphocele with infection  Prostate cancer    Assessment/Plan:  -monitor drain (260 seropurulent output last 24 hrs), plan to repeat imaging to ensure abscess collection resolved, possible abscessogram.   - WBC remains at 20K   - appreciate ID, gen surg, medicine assistance  - Urology will continue to follow closely     The patient had a chance to ask questions which were answered. he understands the above plan. Subjective:   Ric Yost is a 79 y.o. male. He was seen and examined this morning. Today we discussed his drain, prostate cancer and hospital course. He had a chance to ask questions. Pt feels much better overall. Objective:   Vitals:  Vitals:    19 0600   BP:    Pulse: 68   Resp:    Temp:    SpO2:        Intake/Output Summary (Last 24 hours) at 4/3/2019 0739  Last data filed at 4/3/2019 0656  Gross per 24 hour   Intake 900 ml   Output 1270 ml   Net -370 ml     Physical Exam:  Gen: Alert and oriented x3, no acute distress  CV: Regular rate   Resp: unlabored respirations  Abd: Soft, non-distended, non-tender, no masses  Ext: no peripheral edema noted, moves upper and lower extremities spontaneously  Skin: warm and well perfused, no rashes noted on the face, or arms.      Drain LLQ seropurulent    Labs:  Lab Results   Component Value Date    WBC 20.1 (H) 2019    HGB 10.2 (L) 2019    HCT 30.8 (L) 2019    MCV 84.9 2019     2019     Lab Results   Component Value Date    CREATININE 0.7 (L) 2019    BUN 9 2019     2019    K 3.8 2019     2019    CO2 23 2019       Keila Hansen MD  4/3/2019

## 2019-04-03 NOTE — PROGRESS NOTES
[facility]        Progress Note    Patient Name: Ashley Noriega  MRN: [id number]  YOB: 1950  Date of Evaluation: 4/3/2019    Chief Complaint: Abdominal/groin pain    Subjective:  No acute events overnight  Minimal complaints of pain  No nausea or vomiting, tolerating general diet  No reported issues with drain, reports continued high output  Ambulating around room with therapy without difficulties      Vital Signs:  Patient Vitals for the past 24 hrs:   BP Temp Temp src Pulse Resp SpO2 Weight   19 0930 (!) 164/75 99.4 °F (37.4 °C) Temporal 70 18 96 % --   19 0729 -- -- -- -- -- -- 191 lb 8 oz (86.9 kg)   19 0600 -- -- -- 68 -- -- --   19 0400 (!) 142/74 97.8 °F (36.6 °C) Temporal 64 18 92 % --   19 0200 -- -- -- 65 -- -- --   19 0000 131/71 98.2 °F (36.8 °C) Temporal 68 16 93 % --   19 2130 134/71 97.5 °F (36.4 °C) Temporal 64 16 98 % --   19 1711 135/67 96.8 °F (36 °C) Temporal 62 16 98 % --   19 1230 133/71 98 °F (36.7 °C) Temporal 58 16 98 % --      TEMPERATURE HISTORY 24H: Temp (24hrs), Av °F (36.7 °C), Min:96.8 °F (36 °C), Max:99.4 °F (37.4 °C)    BLOOD PRESSURE HISTORY: Systolic (97JMQ), DZZ:203 , Min:125 , DAC:493    Diastolic (57GIK), KPC:26, Min:67, Max:75      Intake/Output:  I/O last 3 completed shifts:   In: 900 [I.V.:900]  Out: 26 [Urine:1050; Drains:220]  I/O this shift:  In: -   Out: 250 [Urine:200; Drains:50]  Drain/tube Output:  Closed/Suction Drain Left LLQ Bulb 12 Malay-Output (ml): 50 ml    Physical Exam:  General: awake, alert, oriented to  person, place, time  Lungs: clear to auscultation  Heart: RRR  Abdomen: soft, mildly tenderness at drain site only, nondistended, no masses or organomegaly   Drain: cloudy yellow output  SKIN:  no bruising or bleeding and normal skin color, texture, turgor      Labs:  CBC:    Recent Labs     19  0434 19  0612 19  0610   WBC 27.0* 20.1* 20.1*   HGB 11.2* 10.8* 10.2*   HCT 33.3* 32.1* 30.8*    304 317     BMP:    Recent Labs     04/01/19  0434 04/02/19  0612 04/03/19  0610   * 136 136   K 3.9 3.8 3.8    105 104   CO2 21 23 23   BUN 18 11 9   CREATININE 0.8 0.7* 0.7*   GLUCOSE 108* 125* 112*     Hepatic:   Recent Labs     04/02/19  0612   AST 75*   ALT 51*   BILITOT 0.8   ALKPHOS 285*     Amylase: No results for input(s): AMYLASE in the last 72 hours. Lipase: No results for input(s): LIPASE in the last 72 hours. Mag:      Recent Labs     04/01/19  0434   MG 2.10      Phos:     Recent Labs     04/01/19  0434   PHOS 2.7      Coags:   No results for input(s): INR, APTT in the last 72 hours. Cultures:  Anaerobic culture  No results for input(s): LABANAE in the last 72 hours. Blood culture  No results for input(s): BC in the last 72 hours. Blood culture 2  No results for input(s): BLOODCULT2 in the last 72 hours. Body fluid culture  No results for input(s): BLOODCULT2 in the last 72 hours. Surgical culture  No results for input(s): CXSURG in the last 72 hours. Fecal occult  No results for input(s): OCCULTBLDFEC in the last 72 hours. Gram stain  No results for input(s): LABGRAM in the last 72 hours. Stool culture 1  No results for input(s): CXST in the last 72 hours. Stool culture 2  No results for input(s): STOOLCULT2 in the last 72 hours. Urine culture  No results for input(s): LABURIN in the last 72 hours. Wound abscess  No results for input(s): WNDABS in the last 72 hours. Pathology:  NA    Imaging:  I have personally reviewed the following films:    Xr Hip Left (2-3 Views)    Result Date: 3/28/2019  EXAMINATION: 2 XRAY VIEWS OF THE LEFT HIP 3/28/2019 12:16 pm COMPARISON: None. HISTORY: ORDERING SYSTEM PROVIDED HISTORY: Left hip pain TECHNOLOGIST PROVIDED HISTORY: Reason for exam:->pain Ordering Physician Provided Reason for Exam: No known injury.  Pain in crease of left leg. started 2 days ago. FINDINGS: The left hip is intact. No fracture or dislocation. There is mild degenerative change in both hip joints with sclerotic change to the acetabulum and mild osteophyte formation. Pelvic bones intact. Vascular phleboliths are present throughout the pelvis. No other significant soft tissue findings. No acute finding in the left hip. Ct Abscess Drainage W Cath Placement S&i    Result Date: 4/1/2019  PROCEDURE: CT GUIDED ABSCESS FLUID DRAIN MODERATE CONSCIOUS SEDATION 3/31/2019 HISTORY: ORDERING SYSTEM PROVIDED HISTORY: abdominal abscess TECHNOLOGIST PROVIDED HISTORY: Reason for exam:->abdominal abscess Ordering Physician Provided Reason for Exam: abdominal abscess drainage Acuity: Acute Type of Exam: Initial TECHNIQUE: Dose modulation, iterative reconstruction, and/or weight based adjustment of the mA/kV was utilized to reduce the radiation dose to as low as reasonably achievable. CONTRAST: None. SEDATION: 2 mgversed and 100 mcg fentanyl were titrated intravenously for moderate sedation monitored under my direction. Total intraservice time of sedation was 15 minutes. The patient's vital signs were monitored throughout the procedure and recorded in the patient's medical record by the nurse. FLUOROSCOPY DOSE AND TYPE OR TIME AND EXPOSURES: 123 CT images, 360.7 mGy DESCRIPTION OF PROCEDURE: Informed consent was obtained after a detailed explanation of the procedure including risks, benefits, and alternatives. Universal protocol was observed. Limited CT imaging was performed through the lower abdomen and pelvis. An appropriate skin entry site was selected. The left groin is prepped and draped in sterile fashion. 1% lidocaine local anesthesia was used. Using CT guidance, a 5 Botswanan needle sheath was advanced into the inferior iliopsoas/left pelvic sidewall collection. Wire was advanced into the collection.   The tract was dilated and a 12 Botswanan locking pigtail drainage catheter was placed into the collection. The catheter was placed to bulb suction. The catheter was secured to the skin using suture. The site was dressed and bandaged in sterile fashion. The patient tolerated procedure well without immediate complication. FINDINGS: 90 mL of cloudy tan fluid was aspirated and sent for culture per request ordering clinician. Technically successful CT-guided placement of a 12 Slovenian locking pigtail drainage catheter into the deep left iliopsoas fluid collection. Ct Abdomen Pelvis W Iv Contrast Additional Contrast? None    Result Date: 3/30/2019  EXAMINATION: CT OF THE ABDOMEN AND PELVIS WITH CONTRAST 3/30/2019 6:09 pm TECHNIQUE: CT of the abdomen and pelvis was performed with the administration of intravenous contrast. Multiplanar reformatted images are provided for review. Dose modulation, iterative reconstruction, and/or weight based adjustment of the mA/kV was utilized to reduce the radiation dose to as low as reasonably achievable. COMPARISON: None. HISTORY: ORDERING SYSTEM PROVIDED HISTORY: left hip pain. ..include bone window please TECHNOLOGIST PROVIDED HISTORY: Additional Contrast?->None Ordering Physician Provided Reason for Exam: Hip Pain (prostate removed 1/28th, patient reporting everything was great after surgery, then this week he thinks he must have caught the flu where hes had high fevers, and his left hip has been hurting so severely he hasnt been able to walk. ) Acuity: Acute Type of Exam: Initial FINDINGS: Lower Chest: Coronary artery atherosclerotic vascular calcifications. Mild bibasilar atelectasis. Trace left pleural effusion. Organs: 9 mm hypodensity in the left hepatic lobe on axial image 51 too small to definitively characterize but likely to represent a benign cyst or hemangioma. The gallbladder is normal in appearance. No biliary ductal dilatation. The pancreas, spleen, and bilateral adrenal glands are unremarkable.   Small bilateral renal hypodensities measuring less than 1 cm too small to definitively characterize but likely to represent benign cysts. No obstructive uropathy. GI/Bowel: Normal appendix. Sigmoid colon diverticulosis. No bowel obstruction or abnormal bowel wall thickening. Pelvis: The urinary bladder is unremarkable. The prostate gland is not clearly visualized possibly status post prostatectomy. There is a large lobular fluid collection along the left pelvic sidewall partially involving the distal psoas and iliacus muscles measuring approximately 16.2 x 8.1 x 4.1 cm on sagittal image 106 and axial image 161. Adjacent fat stranding is noted. No pelvic or inguinal lymphadenopathy. No free intraperitoneal fluid in the pelvis. Peritoneum/Retroperitoneum: The abdominal aorta is normal in caliber with mild atherosclerotic vascular disease. No retroperitoneal or mesenteric lymphadenopathy is identified. No free air or fluid is seen in the abdomen. Bones/Soft Tissues: Status post bilateral vasectomy. Postoperative scarring in the anterior abdominal wall. Mild subcutaneous fat stranding in the left anterolateral abdominal wall. Status post median sternotomy. No acute osseous abnormality. 1. Large lobular fluid collection along the left pelvic sidewall involving the distal psoas and iliacus muscles measuring approximately 16.2 x 8.1 x 4.1 cm with mild adjacent stranding. This may represent an abscess or liquefying hematoma. 2. Status post prostatectomy. Us Dup Lower Extremity Left Kiran    Result Date: 3/29/2019  EXAMINATION: DUPLEX VENOUS ULTRASOUND OF THE LEFT LOWER EXTREMITY 3/29/2019 2:46 pm TECHNIQUE: Duplex ultrasound and Doppler images were obtained of the left lower extremity. COMPARISON: None.  HISTORY: ORDERING SYSTEM PROVIDED HISTORY: Left hip pain TECHNOLOGIST PROVIDED HISTORY: Ordering Physician Provided Reason for Exam: left hip, groin pain, recent prostate surgery 2/2019 FINDINGS: The visualized veins of the left lower extremity are patent and free of echogenic thrombus. The veins are normally compressible and have normal phasic flow. No evidence of DVT in the left lower extremity. Scheduled Meds:   cetirizine  10 mg Oral Daily    fluticasone  1 spray Each Nare Daily    ceFAZolin  3 g Intravenous Q8H    lidocaine 1 % injection  5 mL Intradermal Once    sodium chloride flush  10 mL Intravenous 2 times per day    pantoprazole  40 mg Oral QAM AC    aspirin  81 mg Oral Daily    atorvastatin  10 mg Oral Nightly    sennosides-docusate sodium  1 tablet Oral BID    sodium chloride flush  10 mL Intravenous 2 times per day    enoxaparin  40 mg Subcutaneous Daily     Continuous Infusions:   sodium chloride 100 mL/hr at 04/03/19 0305     PRN Meds:.melatonin, sodium chloride flush, guaiFENesin-dextromethorphan, benzonatate, morphine, sodium chloride flush, ondansetron, potassium chloride **OR** potassium alternative oral replacement **OR** potassium chloride, magnesium sulfate, acetaminophen, oxyCODONE-acetaminophen      Assessment:  79 y.o. male admitted with   1. Psoas abscess (HCC)        Left psoas abscess, status-post IR drainage on 3/31/2019  Recent robot prostatectomy  CAD, status-post CABG      Plan:  1. Drain care/monitoring  2. General diet as tolerated  3. Antibiotics per ID; WBC steady at 20.1 today with slight increase in bandemia, follow cultures; possible repeat CT imaging tomorrow if WBC continuing to rise  4. Activity as tolerated, ambulate TID  5. Pulmonary toilet, incentive spirometry  6. PRN analgesics and antiemetics--minimizing narcotics as tolerated  7. DVT prophylaxis with Lovenox  8. Management of medical comorbid etiologies per primary team and consulting services; workup of cough per IM    EDUCATION:  Educated patient on plan of care and disease process--all questions answered. Plans discussed with patient and nursing. Reviewed and discussed with Dr. Stanislaw Eng.       Signed:  NIHARIKA Decker - CNP  4/3/2019 11:21 AM    I have personally performed a face to face diagnostic evaluation on this patient. I have interviewed and examined the patient and I agree with the assessment above. In summary, my findings and plan are the following:   Mr. Roberto Nielsen is a 79 y.o. male who presents with   Left psoas abscess, s/p percutaneous drainage 3/31/19  Recent robot prostatectomy  CAD, s/p CABG     Plan:  1. Left psoas abscess s/p percutaneous drainage, still with high output, when drainage decreases will likely re-image, if patient ready to discharge prior to this he may be discharged with drain in place, timing yet to be determined  2. Antibiotics per ID, monitor leukocytosis and bandemia, if not improving tomorrow will repeat CT  3. Tolerating diet  4. Pain medication as needed  5. No indication for surgical exploration at this time    Jorge Plascencia MD, FACS  4/3/2019  7:14 PM

## 2019-04-03 NOTE — PROGRESS NOTES
Infectious Diseases   Progress Note      Admission Date: 3/30/2019  Hospital Day: Hospital Day: 5  Attending: Donny Mckenzie MD  Date of service: 4/3/2019    Presenting complaint:   Chief Complaint   Patient presents with    Hip Pain     prostate removed 1/28th, patient reporting everything was great after surgery, then this week he thinks he must have caught the flu where hes had high fevers, and his left hip has been hurting so severely he hasnt been able to walk.         Chief complaint/ Reason for consult: The patient was seen today for the following:    · Sepsis with high fever and marked leukocytosis   · Complicated left pelvic/psoas abscess status postoperative ileus drainage with [address] [place] [address] drain placement - culture positive for MSSA  · Left hip pain  · Mixed hyperlipidemia    Microbiology:        I have reviewed all available micro lab data and cultures    · Blood culture (2/2) - collected on 3/30/2019: Negative  · Body fluid (left pelvic abscess fluid ) culture - collected on 3/30/2019: MSSA    Staphylococcus aureus (1)     Antibiotic Interpretation JING Status    ceFAZolin Sensitive <=4 mcg/mL     ciprofloxacin Intermediate 2 mcg/mL     clindamycin Sensitive <=0.5 mcg/mL     erythromycin Sensitive <=0.5 mcg/mL     oxacillin Sensitive 0.5 mcg/mL     tetracycline Sensitive <=4 mcg/mL     trimethoprim-sulfamethoxazole Sensitive <=0.5/9.5 mcg/mL           · Urine culture  - collected on 3/29/2019: Klebsiella    Klebsiella oxytoca (1)     Antibiotic Interpretation JING Status    amoxicillin-clavulanate Sensitive <=8/4 mcg/mL     ampicillin Resistant >16 mcg/mL     ceFAZolin Resistant 8 mcg/mL     cefepime Sensitive <=2 mcg/mL     cefotaxime Sensitive <=2 mcg/mL     cefTRIAXone Sensitive <=1 mcg/mL     cefuroxime Sensitive <=4 mcg/mL     ciprofloxacin Sensitive <=1 mcg/mL     gentamicin Sensitive <=4 mcg/mL     meropenem Sensitive <=1 mcg/mL     nitrofurantoin Intermediate 64 mcg/mL Workup:    · Continue to follow  fever curve, WBC count and blood cultures  · Follow up on liver and renal function    Antimicrobials:    · Will continue IV cefazolin 2 g every 8 hours for MSSA  · Agree with plans for abscessogram.  If still has a large abscess, may need to consider surgical drainage  · Continue to monitor his vitals closely  · Continue probiotics twice daily  · DVT prophylaxis  · Discussed all above with patient and RN      Drug Monitoring:    · Continue monitoring for antibiotic toxicity as follows: CMP  · Continue to watch for following: new or worsening fever, new hypotension, hives, lip swelling and redness or purulence at vascular access sites. I/v access Management:    · Continue to monitor i.v access sites for erythema, induration, discharge or tenderness. · As always, continue efforts to minimize tubes/lines/drains as clinically appropriate to reduce chances of line associated infections. Patient education and counseling:        · The patient was educated in detail about the side-effects of various antibiotics and things to watch for like new rashes, lip swelling, severe reaction, worsening diarrhea, break through fever etc.  · Discussed patient's condition and what to expect. All of the patient's questions were addressed in a satisfactory manner and patient verbalized understanding all instructions. Weight loss counseling:    Extensive weight loss counseling was done. It is important to set a realistic weight loss goal. First goal should be to avoid gaining more weight and staying at current weight (or within 5 percent). People at high risk of developing diabetes who are able to lose 5 percent of their body weight and maintain this weight will reduce their risk of developing diabetes by about 50 percent and reduce their blood pressure.   Losing more than 15 percent of  body weight and staying at this weight is an extremely good result, even if you never reach your \"dream\" or \"ideal\" weight. Lifestyle changes including changing eating habits, substituting excess carbohydrates with proteins, stress reduction, using self-help programs like Weight Watchers®, Overeaters Anonymous®, and Take Off Pounds Sensibly (TOPS)© , following DASH diet and increasing exercise or walking briskly daily for half hour to and hour 5-7 days a week was suggested among other measures. Information was given about various weight loss education programs and their websites like www.cdc.gov/healthyweight, www.choosemyplate.gov and www.health.gov/dietaryguidelines/    TIME SPENT TODAY:     - Spent over  35  minutes on visit (including interval history, physical exam, review of data including labs, cultures, imaging, development and implementation of treatment plan and coordination of complex care). - Over 50% of time spent with patient face to face on counseling and education. Thank you for involving me in the care of your patient. I will continue to follow. If you have anyadditional questions, please do not hesitate to contact me. Subjective: Interval history: Patient was seen and examined at bedside. Interval history was obtained. He still has significant drainage from the TREVIN drain. He remains on IV cefazolin and oral ciprofloxacin. .  Feels tired. REVIEW OF SYSTEMS:  *    Review of Systems   Constitutional: Positive for fatigue. Negative for chills, diaphoresis and fever. HENT: Negative for ear discharge, ear pain, rhinorrhea, sore throat and trouble swallowing. Eyes: Negative for discharge and redness. Respiratory: Negative for cough, shortness of breath and wheezing. Cardiovascular: Negative for chest pain and leg swelling. Gastrointestinal: Negative for abdominal pain, constipation, diarrhea and nausea. Endocrine: Negative for polyuria. Genitourinary: Negative for dysuria, flank pain, frequency, hematuria and urgency.    Musculoskeletal: Negative for back pain and myalgias. Skin: Negative for rash. Neurological: Negative for dizziness, seizures and headaches. Hematological: Does not bruise/bleed easily. Psychiatric/Behavioral: Negative for hallucinations and suicidal ideas. All other systems reviewed and are negative. Past Medical History: All past medical history reviewed today. Past Medical History:   Diagnosis Date    CAD (coronary artery disease)     Mixed hyperlipidemia 11/26/2018    Prostate cancer ([address] 1/21/2019       Past Surgical History: All past surgical history was reviewed today. Past Surgical History:   Procedure Laterality Date    CARDIAC CATHETERIZATION  12/28/2015    Dr Amy De Jesus    COLONOSCOPY  1-10-11    CORONARY ARTERY BYPASS GRAFT  12.30.2015    [place]    DENTAL SURGERY      PROSTATECTOMY N/A 1/28/2019    ROBOTIC LAPAROSCOPIC RADICAL PROSTATECTOMY WITH BILATERAL LYMPH NODE DISSECTION AND RIGHT SIDE NERVE SPARING performed by Andrea Duverney, MD at [address] [place] [address]         Immunization History: All immunization history was reviewed by me today. There is no immunization history for the selected administration types on file for this patient. Family History: All family history was reviewed today. Problem Relation Age of Onset    Heart Disease Father     Stroke Mother     Other Sister        Objective:       PHYSICAL EXAM:      Vitals:   Vitals:    04/03/19 0400 04/03/19 0600 04/03/19 0729 04/03/19 0930   BP: (!) 142/74   (!) 164/75   Pulse: 64 68  70   Resp: 18   18   Temp: 97.8 °F (36.6 °C)   99.4 °F (37.4 °C)   TempSrc: Temporal   Temporal   SpO2: 92%   96%   Weight:   191 lb 8 oz (86.9 kg)    Height:           Physical Exam   Constitutional: He is oriented to person, place, and time. He appears well-developed. HENT:   Head: Normocephalic and atraumatic. Mouth/Throat: Oropharynx is clear and moist. No oropharyngeal exudate. Eyes: Pupils are equal, round, and reactive to light.  Conjunctivae and EOM are normal. IBILI 0.3 04/02/2019    LABALBU 2.1 04/02/2019       CPK: No results found for: CKTOTAL  ESR:   Lab Results   Component Value Date    SEDRATE 55 (H) 04/01/2019     CRP:   Lab Results   Component Value Date    .7 (H) 04/01/2019           Imaging: All pertinent images and reports for the current visit were reviewed by me during this visit. CT ABSCESS DRAINAGE W CATH PLACEMENT S&I   Final Result   Technically successful CT-guided placement of a 12 Chinese locking pigtail   drainage catheter into the deep left iliopsoas fluid collection. CT ABDOMEN PELVIS W IV CONTRAST Additional Contrast? None   Final Result   1. Large lobular fluid collection along the left pelvic sidewall involving   the distal psoas and iliacus muscles measuring approximately 16.2 x 8.1 x 4.1   cm with mild adjacent stranding. This may represent an abscess or liquefying   hematoma. 2. Status post prostatectomy. Medications: All current and past medications were reviewed.      cetirizine  10 mg Oral Daily    fluticasone  1 spray Each Nare Daily    ceFAZolin  3 g Intravenous Q8H    lidocaine 1 % injection  5 mL Intradermal Once    sodium chloride flush  10 mL Intravenous 2 times per day    pantoprazole  40 mg Oral QAM AC    aspirin  81 mg Oral Daily    atorvastatin  10 mg Oral Nightly    sennosides-docusate sodium  1 tablet Oral BID    sodium chloride flush  10 mL Intravenous 2 times per day    enoxaparin  40 mg Subcutaneous Daily        sodium chloride 100 mL/hr at 04/03/19 0305       melatonin, sodium chloride flush, guaiFENesin-dextromethorphan, benzonatate, morphine, sodium chloride flush, ondansetron, potassium chloride **OR** potassium alternative oral replacement **OR** potassium chloride, magnesium sulfate, acetaminophen, oxyCODONE-acetaminophen    Current antibiotics: All antibiotics and their doses were reviewed by me today    Recent Abx Admin                   ceFAZolin (ANCEF) 3 g in dextrose 5 % 100 mL IVPB (g) 3 g New Bag 04/03/19 0610     3 g New Bag 04/02/19 2009     3 g New Bag  1326                Known drug Allergies: All allergies were reviewed and updated    No Known Allergies        Please note that this chart was generated using Dragon dictation software. Although every effort was made to ensure the accuracy of this automated transcription, some errors in transcription may have occurred inadvertently. If you may need any clarification, please do not hesitate to contact me through EPIC or at the phone number provided below with my electronic signature.     Maribel Conroy MD, MPH  4/3/2019, 11:59 AM  [facility]   Office: [phone]  Fax: [phone]  Tuesday AM clinic:   [address] [place] [address] 120  Thursday AM [id number] Shweta, [facility]

## 2019-04-03 NOTE — PLAN OF CARE
No pain reported during the shift. Pt O2 sat is above 90%. No signs or symptoms of distress reported.

## 2019-04-03 NOTE — PROGRESS NOTES
Hospitalist Progress Note      PCP: Nitin Darnell DO    Date of Admission: 3/30/2019    Chief Complaint: Groin pain      Subjective:   Pain is improved  Denies fever, chills. Intermittent chronic cough. Drain in place with output  WBC 20    Medications:  Reviewed    Infusion Medications    sodium chloride 100 mL/hr at 04/03/19 0305     Scheduled Medications    cetirizine  10 mg Oral Daily    fluticasone  1 spray Each Nare Daily    ceFAZolin  3 g Intravenous Q8H    lidocaine 1 % injection  5 mL Intradermal Once    sodium chloride flush  10 mL Intravenous 2 times per day    pantoprazole  40 mg Oral QAM AC    aspirin  81 mg Oral Daily    atorvastatin  10 mg Oral Nightly    sennosides-docusate sodium  1 tablet Oral BID    sodium chloride flush  10 mL Intravenous 2 times per day    enoxaparin  40 mg Subcutaneous Daily     PRN Meds: melatonin, sodium chloride flush, guaiFENesin-dextromethorphan, benzonatate, morphine, sodium chloride flush, ondansetron, potassium chloride **OR** potassium alternative oral replacement **OR** potassium chloride, magnesium sulfate, acetaminophen, oxyCODONE-acetaminophen      Intake/Output Summary (Last 24 hours) at 4/3/2019 1312  Last data filed at 4/3/2019 1130  Gross per 24 hour   Intake 900 ml   Output 1550 ml   Net -650 ml       Exam:    BP (!) 164/75   Pulse 70   Temp 99.4 °F (37.4 °C) (Temporal)   Resp 18   Ht 5' 8\" (1.727 m)   Wt 191 lb 8 oz (86.9 kg)   SpO2 96%   BMI 29.12 kg/m²     Gen/overall appearance: Not in acute distress. Alert. Head: Normocephalic, atraumatic  Eyes: EOMI, no scleral icterus  CVS: regular rate and rhythm, Normal S1S2  Pulm: Clear to auscultation bilaterally.  No crackles/wheezes  Gastrointestinal: Soft, nontender, nondistended, no guarding or rebound  Extremities: L groin drain in place, No edema  Neuro: No gross focal deficits noted  Skin: Warm, dry    Labs:   Recent Labs     04/01/19  0434 04/02/19  0612 04/03/19  0610   WBC

## 2019-04-03 NOTE — PROGRESS NOTES
Physical Therapy  Facility/Department: [address]  Daily Treatment Note  NAME: Umair Romero  : 1950  MRN: [id number]    Date of Service: 4/3/2019    Discharge Recommendations:  Umair Romero scored a 22/24 on the AM-PAC short mobility form. Current research shows that an AM-PAC score of 18 or greater is typically associated with a discharge to the patient's home setting. At this time, no further PT is recommended upon discharge due to pt has 24/7 assistance from his wife. Patient educated on option for outpatient therapy in future if hip ROM does not improve with increased activity/mobility. PT Equipment Recommendations  Equipment Needed: Yes  Mobility Devices: Simona Rebecca: Rolling    Patient Diagnosis(es): The encounter diagnosis was Psoas abscess ([facility] has a past medical history of CAD (coronary artery disease), Mixed hyperlipidemia, and Prostate cancer ([facility] [facility] has a past surgical history that includes Dental surgery; Colonoscopy (1-10-11); Cardiac catheterization (2015); Coronary artery bypass graft (2015); and Prostatectomy (N/A, 2019). Restrictions  Restrictions/Precautions  Restrictions/Precautions: Fall Risk(high fall risk)  Required Braces or Orthoses?: No  Position Activity Restriction  Other position/activity restrictions: 78 yo man with hx prostate ca (s/p prostatectomy 2019), CAD, elevated lipids; developed severe L hip pain 3/27/19, unable to ambulate; fluid collection L lower abdomen and distal psoas; CT guided abscess drainage on 3/31; catheter in place to drain    Subjective   General  Chart Reviewed: Yes  Response To Previous Treatment: Patient with no complaints from previous session. Family / Caregiver Present: No  Subjective  Subjective: Reports 1/10 pain in (L) hip and anterior thigh. Reports general stiffness and edema in (L) hip/thigh  General Comment  Comments: Pt supine in bed upon arrival, agreeable to therapy session.   Alarms are not in use upon arrival.  Pain Assessment  Pain Level: 1  Pain Type: Acute pain  Pain Location: Hip  Pain Orientation: Left; Anterior  Non-Pharmaceutical Pain Intervention(s): Repositioned; Ambulation/Increased Activity       Orientation  Orientation  Overall Orientation Status: Within Normal Limits     Objective   Bed mobility  Rolling to Right: Modified independent  Supine to Sit: Modified independent  Scooting: Independent  Comment: HOB elevated to ~ 30* with use of handrails  Transfers  Sit to Stand: Modified independent(UE stabilization in standing position)  Stand to sit: Modified independent  Stand Pivot Transfers: Modified independent  Ambulation 1  Surface: level tile  Device: Rolling Walker  Assistance: Supervision  Quality of Gait: Pt ambulates with increased hip/trunk flexion and decreased stride legnth secondary to pain and (L) hip extension ROM restrictions   Distance: 200 ft + 150 ft + 150 ft  Stairs/Curb  Stairs?: Yes  Stairs  # Steps : 3  Stairs Height: 6\"  Rails: Left ascending  Assistance: Stand by assistance  Comment: Step to sequence     Exercises  Heelslides: 15 sec hold x 10  Hip Flexion: x 10 seated  Knee Long Arc Quad: x 10         Comment: In addition to above mobility, pt completes sit to stand transfer at mod (I) from bedside chair, ambulation 10 ft <=> from bathroom at supervision level with RW, completes toilet transfer at mod (I) with (L) grab bar, and maintains standing balance at sink and during clothing management at supervision level. Assessment   Body structures, Functions, Activity limitations: Decreased functional mobility ; Decreased ROM; Decreased strength;Decreased balance; Increased Pain;Decreased endurance  Assessment: Pt continues to present with decreased (L) hip ROM and ongoing pain following procedure resulting in decline functional indepedence with mobility tasks.   Pt would benefit from ongoing therapy services to futher progress functional independence in

## 2019-04-04 LAB
ANION GAP SERPL CALCULATED.3IONS-SCNC: 10 MMOL/L (ref 3–16)
BASOPHILS ABSOLUTE: 0 K/UL (ref 0–0.2)
BASOPHILS RELATIVE PERCENT: 0 %
BLOOD CULTURE, ROUTINE: NORMAL
BUN BLDV-MCNC: 9 MG/DL (ref 7–20)
CALCIUM SERPL-MCNC: 7.6 MG/DL (ref 8.3–10.6)
CHLORIDE BLD-SCNC: 103 MMOL/L (ref 99–110)
CO2: 23 MMOL/L (ref 21–32)
CREAT SERPL-MCNC: 0.7 MG/DL (ref 0.8–1.3)
CULTURE, BLOOD 2: NORMAL
EOSINOPHILS ABSOLUTE: 0.2 K/UL (ref 0–0.6)
EOSINOPHILS RELATIVE PERCENT: 1 %
GFR AFRICAN AMERICAN: >60
GFR NON-AFRICAN AMERICAN: >60
GLUCOSE BLD-MCNC: 118 MG/DL (ref 70–99)
HCT VFR BLD CALC: 30.8 % (ref 40.5–52.5)
HEMOGLOBIN: 10.4 G/DL (ref 13.5–17.5)
LYMPHOCYTES ABSOLUTE: 0.8 K/UL (ref 1–5.1)
LYMPHOCYTES RELATIVE PERCENT: 5 %
MCH RBC QN AUTO: 28.5 PG (ref 26–34)
MCHC RBC AUTO-ENTMCNC: 33.8 G/DL (ref 31–36)
MCV RBC AUTO: 84.4 FL (ref 80–100)
METAMYELOCYTES RELATIVE PERCENT: 1 %
MONOCYTES ABSOLUTE: 0.8 K/UL (ref 0–1.3)
MONOCYTES RELATIVE PERCENT: 5 %
NEUTROPHILS ABSOLUTE: 13.4 K/UL (ref 1.7–7.7)
NEUTROPHILS RELATIVE PERCENT: 88 %
PDW BLD-RTO: 14.3 % (ref 12.4–15.4)
PLATELET # BLD: 301 K/UL (ref 135–450)
PLATELET SLIDE REVIEW: ADEQUATE
PMV BLD AUTO: 8.1 FL (ref 5–10.5)
POTASSIUM SERPL-SCNC: 4.1 MMOL/L (ref 3.5–5.1)
RBC # BLD: 3.65 M/UL (ref 4.2–5.9)
RBC # BLD: NORMAL 10*6/UL
SLIDE REVIEW: ABNORMAL
SODIUM BLD-SCNC: 136 MMOL/L (ref 136–145)
WBC # BLD: 15 K/UL (ref 4–11)

## 2019-04-04 PROCEDURE — 85025 COMPLETE CBC W/AUTO DIFF WBC: CPT

## 2019-04-04 PROCEDURE — 97530 THERAPEUTIC ACTIVITIES: CPT

## 2019-04-04 PROCEDURE — 6360000002 HC RX W HCPCS: Performed by: NURSE PRACTITIONER

## 2019-04-04 PROCEDURE — 99232 SBSQ HOSP IP/OBS MODERATE 35: CPT | Performed by: INTERNAL MEDICINE

## 2019-04-04 PROCEDURE — 2580000003 HC RX 258: Performed by: NURSE PRACTITIONER

## 2019-04-04 PROCEDURE — 99232 SBSQ HOSP IP/OBS MODERATE 35: CPT | Performed by: SURGERY

## 2019-04-04 PROCEDURE — 2060000000 HC ICU INTERMEDIATE R&B

## 2019-04-04 PROCEDURE — 6370000000 HC RX 637 (ALT 250 FOR IP): Performed by: NURSE PRACTITIONER

## 2019-04-04 PROCEDURE — 6370000000 HC RX 637 (ALT 250 FOR IP): Performed by: PHYSICIAN ASSISTANT

## 2019-04-04 PROCEDURE — 80048 BASIC METABOLIC PNL TOTAL CA: CPT

## 2019-04-04 PROCEDURE — 6370000000 HC RX 637 (ALT 250 FOR IP): Performed by: HOSPITALIST

## 2019-04-04 PROCEDURE — 2580000003 HC RX 258: Performed by: INTERNAL MEDICINE

## 2019-04-04 PROCEDURE — 6360000002 HC RX W HCPCS: Performed by: INTERNAL MEDICINE

## 2019-04-04 RX ORDER — DIPHENHYDRAMINE HYDROCHLORIDE 50 MG/ML
5 INJECTION INTRAMUSCULAR; INTRAVENOUS EVERY 6 HOURS PRN
Status: DISCONTINUED | OUTPATIENT
Start: 2019-04-04 | End: 2019-04-07 | Stop reason: HOSPADM

## 2019-04-04 RX ADMIN — ACETAMINOPHEN 650 MG: 325 TABLET, FILM COATED ORAL at 20:27

## 2019-04-04 RX ADMIN — Medication 10 ML: at 09:03

## 2019-04-04 RX ADMIN — CETIRIZINE HYDROCHLORIDE 10 MG: 10 TABLET, FILM COATED ORAL at 09:02

## 2019-04-04 RX ADMIN — PANTOPRAZOLE SODIUM 40 MG: 40 TABLET, DELAYED RELEASE ORAL at 06:33

## 2019-04-04 RX ADMIN — Medication 3 G: at 21:30

## 2019-04-04 RX ADMIN — SODIUM CHLORIDE: 9 INJECTION, SOLUTION INTRAVENOUS at 11:50

## 2019-04-04 RX ADMIN — SODIUM CHLORIDE: 9 INJECTION, SOLUTION INTRAVENOUS at 21:31

## 2019-04-04 RX ADMIN — ENOXAPARIN SODIUM 40 MG: 40 INJECTION SUBCUTANEOUS at 09:02

## 2019-04-04 RX ADMIN — ATORVASTATIN CALCIUM 10 MG: 10 TABLET, FILM COATED ORAL at 20:28

## 2019-04-04 RX ADMIN — Medication 3 G: at 13:01

## 2019-04-04 RX ADMIN — GUAIFENESIN AND DEXTROMETHORPHAN 5 ML: 100; 10 SYRUP ORAL at 20:44

## 2019-04-04 RX ADMIN — ASPIRIN 81 MG 81 MG: 81 TABLET ORAL at 09:02

## 2019-04-04 RX ADMIN — Medication 10 ML: at 20:28

## 2019-04-04 RX ADMIN — MELATONIN TAB 3 MG 6 MG: 3 TAB at 20:44

## 2019-04-04 RX ADMIN — BENZONATATE 100 MG: 100 CAPSULE ORAL at 09:02

## 2019-04-04 RX ADMIN — Medication 3 G: at 06:33

## 2019-04-04 ASSESSMENT — ENCOUNTER SYMPTOMS
SHORTNESS OF BREATH: 0
EYE REDNESS: 0
RHINORRHEA: 0
DIARRHEA: 0
BACK PAIN: 0
CONSTIPATION: 0
ABDOMINAL PAIN: 0
TROUBLE SWALLOWING: 0
NAUSEA: 0
COUGH: 0
SORE THROAT: 0
WHEEZING: 0
EYE DISCHARGE: 0

## 2019-04-04 ASSESSMENT — PAIN DESCRIPTION - DESCRIPTORS: DESCRIPTORS: ACHING

## 2019-04-04 ASSESSMENT — PAIN SCALES - GENERAL
PAINLEVEL_OUTOF10: 2
PAINLEVEL_OUTOF10: 0

## 2019-04-04 ASSESSMENT — PAIN DESCRIPTION - LOCATION: LOCATION: HIP

## 2019-04-04 ASSESSMENT — PAIN DESCRIPTION - PAIN TYPE: TYPE: ACUTE PAIN

## 2019-04-04 ASSESSMENT — PAIN DESCRIPTION - ORIENTATION: ORIENTATION: RIGHT;ANTERIOR

## 2019-04-04 ASSESSMENT — PAIN DESCRIPTION - FREQUENCY: FREQUENCY: INTERMITTENT

## 2019-04-04 NOTE — PROGRESS NOTES
Hospitalist Progress Note      PCP: Freda Bills DO    Date of Admission: 3/30/2019    Chief Complaint: Groin pain      Subjective:   Pain is resolved. Denies fever, chills. Intermittent chronic cough. Drain in place still with output  WBC down to 15    Medications:  Reviewed    Infusion Medications    sodium chloride 100 mL/hr at 04/04/19 1150     Scheduled Medications    cetirizine  10 mg Oral Daily    fluticasone  1 spray Each Nare Daily    ceFAZolin  3 g Intravenous Q8H    lidocaine 1 % injection  5 mL Intradermal Once    sodium chloride flush  10 mL Intravenous 2 times per day    pantoprazole  40 mg Oral QAM AC    aspirin  81 mg Oral Daily    atorvastatin  10 mg Oral Nightly    sennosides-docusate sodium  1 tablet Oral BID    sodium chloride flush  10 mL Intravenous 2 times per day    enoxaparin  40 mg Subcutaneous Daily     PRN Meds: melatonin, sodium chloride flush, guaiFENesin-dextromethorphan, benzonatate, morphine, sodium chloride flush, ondansetron, potassium chloride **OR** potassium alternative oral replacement **OR** potassium chloride, magnesium sulfate, acetaminophen, oxyCODONE-acetaminophen      Intake/Output Summary (Last 24 hours) at 4/4/2019 1206  Last data filed at 4/4/2019 1100  Gross per 24 hour   Intake --   Output 1080 ml   Net -1080 ml       Exam:    BP (!) 149/71   Pulse 59   Temp 97.7 °F (36.5 °C) (Temporal)   Resp 16   Ht 5' 8\" (1.727 m)   Wt 197 lb 11.2 oz (89.7 kg)   SpO2 100%   BMI 30.06 kg/m²     Gen/overall appearance: Not in acute distress. Alert. Head: Normocephalic, atraumatic  Eyes: EOMI, no scleral icterus  CVS: regular rate and rhythm, Normal S1S2  Pulm: Clear to auscultation bilaterally.  No crackles/wheezes  Gastrointestinal: Soft, nontender, nondistended, no guarding or rebound  Extremities: L groin drain in place, No edema  Neuro: No gross focal deficits noted  Skin: Warm, dry    Labs:   Recent Labs     04/02/19  0612 04/03/19  0610 04/04/19  0815   WBC 20.1* 20.1* 15.0*   HGB 10.8* 10.2* 10.4*   HCT 32.1* 30.8* 30.8*    317 301     Recent Labs     04/02/19  0612 04/03/19  0610 04/04/19  0815    136 136   K 3.8 3.8 4.1    104 103   CO2 23 23 23   BUN 11 9 9   CREATININE 0.7* 0.7* 0.7*   CALCIUM 7.6* 7.7* 7.6*     Recent Labs     04/02/19  0612   AST 75*   ALT 51*   BILIDIR 0.5*   BILITOT 0.8   ALKPHOS 285*     No results for input(s): INR in the last 72 hours. No results for input(s): Kurtis Mylar in the last 72 hours. Assessment/Plan:    Active Hospital Problems    Diagnosis Date Noted    Weight loss counseling, encounter for [U72.6]     Complicated UTI (urinary tract infection) [N39.0]     Receiving intravenous antibiotic treatment as outpatient [Z79.2]     Sepsis due to methicillin susceptible Staphylococcus aureus ([facility] [A41.01]     History of prostate cancer [Z85.46]     Status post prostatectomy [Z90.79]     Coronary artery disease due to lipid rich plaque [I25.10, I25.83]     Overweight [E66.3]     Leukocytosis [D72.829] 03/31/2019    Fever [R50.9] 03/31/2019    Psoas abscess ([facility] [K68.12] 03/30/2019     Psoas abscess s/p for IR guided prec drain placement. Culture remarkable for MSSA  - PICC placed  - IV cefazolin per ID recs  - drain management per GS  - pain control     Klebsiella UTI  - po cipro per ID recs     Sepsis 2/2 above. POA. Now resolved     Hx of prostate cancer s/p prostatectomy  - per urology     Chronic cough. Ongoing for years.   Likely allergic   - no gross signs of infection  - on empiric PPI  - follows as outpateint     Hyponatremia; now resolved     CAD s/p CABG    DVT Prophylaxis: lovenox  Diet: DIET GENERAL;  Code Status: Full Code    Dispo - Inpt, 1-2 days    Anne De Jesus MD

## 2019-04-04 NOTE — PROGRESS NOTES
[facility]        Progress Note    Patient Name: Kael Broderick  MRN: [id number]  YOB: 1950  Date of Evaluation: 2019    Chief Complaint: Abdominal/groin pain    Subjective:  No acute events overnight  Pain controlled  Tolerating diet  No reported issues with drain, reports continued high output  Ambulating with therapy without difficulties    Vital Signs:  Patient Vitals for the past 24 hrs:   BP Temp Temp src Pulse Resp SpO2 Weight   19 1119 (!) 149/71 97.7 °F (36.5 °C) Temporal 59 16 100 % --   19 0811 (!) 140/72 97.8 °F (36.6 °C) Temporal 62 16 96 % --   19 0725 -- -- -- -- -- -- 197 lb 11.2 oz (89.7 kg)   19 0645 (!) 144/75 97.8 °F (36.6 °C) Temporal 68 16 95 % --   19 0208 138/71 98.2 °F (36.8 °C) Temporal 69 16 93 % --   19 2120 (!) 147/74 98.2 °F (36.8 °C) Temporal 74 18 97 % --   19 1740 (!) 161/78 100.6 °F (38.1 °C) Oral 70 18 95 % --   19 1405 (!) 149/75 99 °F (37.2 °C) Temporal 65 18 97 % --      TEMPERATURE HISTORY 24H: Temp (24hrs), Av.5 °F (36.9 °C), Min:97.7 °F (36.5 °C), Max:100.6 °F (38.1 °C)    BLOOD PRESSURE HISTORY: Systolic (02GCW), WIW:075 , Min:131 , ANV:570    Diastolic (87FGU), RXP:49, Min:71, Max:78      Intake/Output:  I/O last 3 completed shifts:  In: -   Out: 1310 [Urine:1050; Drains:260]  I/O this shift:  In: -   Out: 50 [Drains:50]  Drain/tube Output:  Closed/Suction Drain Left LLQ Bulb 12 Mozambican-Output (ml): 50 ml    Physical Exam:  General: awake, alert, oriented to  person, place, time  Lungs: clear to auscultation  Heart: RRR  Abdomen: soft, mildly tenderness at drain site only, nondistended, no masses or organomegaly   Drain: cloudy yellow output  SKIN:  no bruising or bleeding and normal skin color, texture, turgor      Labs:  CBC:    Recent Labs     19  0612 19  0610 19  0815   WBC 20.1* 20.1* 15.0*   HGB 10.8* 10.2* 10.4*   HCT 32.1* 30.8* 30.8*  317 301     BMP:    Recent Labs     04/02/19  0612 04/03/19  0610 04/04/19  0815    136 136   K 3.8 3.8 4.1    104 103   CO2 23 23 23   BUN 11 9 9   CREATININE 0.7* 0.7* 0.7*   GLUCOSE 125* 112* 118*     Hepatic:   Recent Labs     04/02/19  0612   AST 75*   ALT 51*   BILITOT 0.8   ALKPHOS 285*     Amylase: No results for input(s): AMYLASE in the last 72 hours. Lipase: No results for input(s): LIPASE in the last 72 hours. Mag:      No results for input(s): MG in the last 72 hours. Phos:     No results for input(s): PHOS in the last 72 hours. Coags:   No results for input(s): INR, APTT in the last 72 hours. Cultures:  Anaerobic culture  No results for input(s): LABANAE in the last 72 hours. Blood culture  No results for input(s): BC in the last 72 hours. Blood culture 2  No results for input(s): BLOODCULT2 in the last 72 hours. Body fluid culture  No results for input(s): BLOODCULT2 in the last 72 hours. Surgical culture  No results for input(s): CXSURG in the last 72 hours. Fecal occult  No results for input(s): OCCULTBLDFEC in the last 72 hours. Gram stain  No results for input(s): LABGRAM in the last 72 hours. Stool culture 1  No results for input(s): CXST in the last 72 hours. Stool culture 2  No results for input(s): STOOLCULT2 in the last 72 hours. Urine culture  No results for input(s): LABURIN in the last 72 hours. Wound abscess  No results for input(s): WNDABS in the last 72 hours. Pathology:  NA    Imaging:  I have personally reviewed the following films:    Xr Hip Left (2-3 Views)    Result Date: 3/28/2019  EXAMINATION: 2 XRAY VIEWS OF THE LEFT HIP 3/28/2019 12:16 pm COMPARISON: None. HISTORY: ORDERING SYSTEM PROVIDED HISTORY: Left hip pain TECHNOLOGIST PROVIDED HISTORY: Reason for exam:->pain Ordering Physician Provided Reason for Exam: No known injury. Pain in crease of left leg. started 2 days ago. FINDINGS: The left hip is intact.   No fracture or dislocation. There is mild degenerative change in both hip joints with sclerotic change to the acetabulum and mild osteophyte formation. Pelvic bones intact. Vascular phleboliths are present throughout the pelvis. No other significant soft tissue findings. No acute finding in the left hip. Ct Abscess Drainage W Cath Placement S&i    Result Date: 4/1/2019  PROCEDURE: CT GUIDED ABSCESS FLUID DRAIN MODERATE CONSCIOUS SEDATION 3/31/2019 HISTORY: ORDERING SYSTEM PROVIDED HISTORY: abdominal abscess TECHNOLOGIST PROVIDED HISTORY: Reason for exam:->abdominal abscess Ordering Physician Provided Reason for Exam: abdominal abscess drainage Acuity: Acute Type of Exam: Initial TECHNIQUE: Dose modulation, iterative reconstruction, and/or weight based adjustment of the mA/kV was utilized to reduce the radiation dose to as low as reasonably achievable. CONTRAST: None. SEDATION: 2 mgversed and 100 mcg fentanyl were titrated intravenously for moderate sedation monitored under my direction. Total intraservice time of sedation was 15 minutes. The patient's vital signs were monitored throughout the procedure and recorded in the patient's medical record by the nurse. FLUOROSCOPY DOSE AND TYPE OR TIME AND EXPOSURES: 123 CT images, 360.7 mGy DESCRIPTION OF PROCEDURE: Informed consent was obtained after a detailed explanation of the procedure including risks, benefits, and alternatives. Universal protocol was observed. Limited CT imaging was performed through the lower abdomen and pelvis. An appropriate skin entry site was selected. The left groin is prepped and draped in sterile fashion. 1% lidocaine local anesthesia was used. Using CT guidance, a 5 Wolof needle sheath was advanced into the inferior iliopsoas/left pelvic sidewall collection. Wire was advanced into the collection. The tract was dilated and a 12 Wolof locking pigtail drainage catheter was placed into the collection.   The catheter was placed to bulb suction. The catheter was secured to the skin using suture. The site was dressed and bandaged in sterile fashion. The patient tolerated procedure well without immediate complication. FINDINGS: 90 mL of cloudy tan fluid was aspirated and sent for culture per request ordering clinician. Technically successful CT-guided placement of a 12 Egyptian locking pigtail drainage catheter into the deep left iliopsoas fluid collection. Ct Abdomen Pelvis W Iv Contrast Additional Contrast? None    Result Date: 3/30/2019  EXAMINATION: CT OF THE ABDOMEN AND PELVIS WITH CONTRAST 3/30/2019 6:09 pm TECHNIQUE: CT of the abdomen and pelvis was performed with the administration of intravenous contrast. Multiplanar reformatted images are provided for review. Dose modulation, iterative reconstruction, and/or weight based adjustment of the mA/kV was utilized to reduce the radiation dose to as low as reasonably achievable. COMPARISON: None. HISTORY: ORDERING SYSTEM PROVIDED HISTORY: left hip pain. ..include bone window please TECHNOLOGIST PROVIDED HISTORY: Additional Contrast?->None Ordering Physician Provided Reason for Exam: Hip Pain (prostate removed 1/28th, patient reporting everything was great after surgery, then this week he thinks he must have caught the flu where hes had high fevers, and his left hip has been hurting so severely he hasnt been able to walk. ) Acuity: Acute Type of Exam: Initial FINDINGS: Lower Chest: Coronary artery atherosclerotic vascular calcifications. Mild bibasilar atelectasis. Trace left pleural effusion. Organs: 9 mm hypodensity in the left hepatic lobe on axial image 51 too small to definitively characterize but likely to represent a benign cyst or hemangioma. The gallbladder is normal in appearance. No biliary ductal dilatation. The pancreas, spleen, and bilateral adrenal glands are unremarkable.   Small bilateral renal hypodensities measuring less than 1 cm too small to definitively characterize but likely to represent benign cysts. No obstructive uropathy. GI/Bowel: Normal appendix. Sigmoid colon diverticulosis. No bowel obstruction or abnormal bowel wall thickening. Pelvis: The urinary bladder is unremarkable. The prostate gland is not clearly visualized possibly status post prostatectomy. There is a large lobular fluid collection along the left pelvic sidewall partially involving the distal psoas and iliacus muscles measuring approximately 16.2 x 8.1 x 4.1 cm on sagittal image 106 and axial image 161. Adjacent fat stranding is noted. No pelvic or inguinal lymphadenopathy. No free intraperitoneal fluid in the pelvis. Peritoneum/Retroperitoneum: The abdominal aorta is normal in caliber with mild atherosclerotic vascular disease. No retroperitoneal or mesenteric lymphadenopathy is identified. No free air or fluid is seen in the abdomen. Bones/Soft Tissues: Status post bilateral vasectomy. Postoperative scarring in the anterior abdominal wall. Mild subcutaneous fat stranding in the left anterolateral abdominal wall. Status post median sternotomy. No acute osseous abnormality. 1. Large lobular fluid collection along the left pelvic sidewall involving the distal psoas and iliacus muscles measuring approximately 16.2 x 8.1 x 4.1 cm with mild adjacent stranding. This may represent an abscess or liquefying hematoma. 2. Status post prostatectomy. Us Dup Lower Extremity Left Kiran    Result Date: 3/29/2019  EXAMINATION: DUPLEX VENOUS ULTRASOUND OF THE LEFT LOWER EXTREMITY 3/29/2019 2:46 pm TECHNIQUE: Duplex ultrasound and Doppler images were obtained of the left lower extremity. COMPARISON: None.  HISTORY: ORDERING SYSTEM PROVIDED HISTORY: Left hip pain TECHNOLOGIST PROVIDED HISTORY: Ordering Physician Provided Reason for Exam: left hip, groin pain, recent prostate surgery 2/2019 FINDINGS: The visualized veins of the left lower extremity are patent and free of echogenic thrombus. The veins are normally compressible and have normal phasic flow. No evidence of DVT in the left lower extremity. Scheduled Meds:   cetirizine  10 mg Oral Daily    fluticasone  1 spray Each Nare Daily    ceFAZolin  3 g Intravenous Q8H    lidocaine 1 % injection  5 mL Intradermal Once    sodium chloride flush  10 mL Intravenous 2 times per day    pantoprazole  40 mg Oral QAM AC    aspirin  81 mg Oral Daily    atorvastatin  10 mg Oral Nightly    sennosides-docusate sodium  1 tablet Oral BID    sodium chloride flush  10 mL Intravenous 2 times per day    enoxaparin  40 mg Subcutaneous Daily     Continuous Infusions:   sodium chloride 100 mL/hr at 04/03/19 1425     PRN Meds:.melatonin, sodium chloride flush, guaiFENesin-dextromethorphan, benzonatate, morphine, sodium chloride flush, ondansetron, potassium chloride **OR** potassium alternative oral replacement **OR** potassium chloride, magnesium sulfate, acetaminophen, oxyCODONE-acetaminophen      Assessment:  79 y.o. male admitted with   1. Psoas abscess ([facility] [address]      Mr. Daniel Beavers is a 79 y.o. male who presents with   Left psoas abscess, s/p percutaneous drainage 3/31/19  Recent robot prostatectomy  CAD, s/p CABG     Plan:  1. Left psoas abscess s/p percutaneous drainage, still with high output, when drainage decreases will likely re-image, if patient ready to discharge prior to this he may be discharged with drain in place, timing yet to be determined  2. Antibiotics per ID, leukocytosis improving and bandemia resolved, if worsen again will need repeat CT  3. Tolerating diet  4. Pain medication as needed  5. No indication for surgical exploration at this time  6. Discharge planning, anticipate 1-3 days    Dustin Espinal MD, FACS  4/4/2019  11:42 AM

## 2019-04-04 NOTE — PROGRESS NOTES
piperacillin-tazobactam Sensitive <=16 mcg/mL     trimethoprim-sulfamethoxazole Sensitive <=2/38 mcg/mL           Problem list:       Patient Active Problem List   Diagnosis Code    Chest pain on exertion R07.9    Exertional chest pain R07.9    ACS (acute coronary syndrome) ([facility]) I24.9    Abnormal stress test R94.39    Coronary artery disease involving native coronary artery of native heart without angina pectoris I25.10    S/P CABG x 5 Z95.1    Elevated PSA R97.20    Mixed hyperlipidemia E78.2    Prostate cancer ([facility] [address] C61    Psoas abscess ([facility]) K68.12    Leukocytosis D72.829    Fever R50.9    Sepsis due to methicillin susceptible Staphylococcus aureus ([facility]) A41.01    History of prostate cancer Z85.46    Status post prostatectomy Z90.79    Coronary artery disease due to lipid rich plaque I25.10, I25.83    Overweight S88.9    Complicated UTI (urinary tract infection) N39.0    Receiving intravenous antibiotic treatment as outpatient Z79.2    Weight loss counseling, encounter for Z71.3         Assessment:     The patient is a 79 y.o. old male who  has a past medical history of CAD (coronary artery disease), Mixed hyperlipidemia (11/26/2018), and Prostate cancer ([facility] [address] (1/21/2019). with following problems:    · Sepsis with high fever and marked leukocytosis -white cell count is improving, sepsis resolved  · Complicated left pelvic/psoas abscess status postoperative ileus drainage with 12 [place] TREVIN drain placement - culture positive for MSSA - currently on IV cefazolin  · Complicated UTI in a male with Klebsiella  · CA prostate status post prostatectomy on 1/28/19  · Left hip pain  · Mixed hyperlipidemia  · Coronary artery disease  · Overweight due to excess calorie intake : Body mass index is 30.06 kg/m². Discussion:      He is on iv cefazolin. He is tolerating antibiotics ok    W[place] [address] today.     Plan:     Diagnostic Workup:    · Continue to follow  fever curve, WBC count and blood rhinorrhea, sore throat and trouble swallowing. Eyes: Negative for discharge and redness. Respiratory: Negative for cough, shortness of breath and wheezing. Cardiovascular: Negative for chest pain and leg swelling. Gastrointestinal: Negative for abdominal pain, constipation, diarrhea and nausea. Endocrine: Negative for polyuria. Genitourinary: Negative for dysuria, flank pain, frequency, hematuria and urgency. Musculoskeletal: Negative for back pain and myalgias. Skin: Negative for rash. Neurological: Negative for dizziness, seizures and headaches. Hematological: Does not bruise/bleed easily. Psychiatric/Behavioral: Negative for hallucinations and suicidal ideas. All other systems reviewed and are negative. Past Medical History: All past medical history reviewed today. Past Medical History:   Diagnosis Date    CAD (coronary artery disease)     Mixed hyperlipidemia 11/26/2018    Prostate cancer ([facility] [address] 1/21/2019       Past Surgical History: All past surgical history was reviewed today. Past Surgical History:   Procedure Laterality Date    CARDIAC CATHETERIZATION  12/28/2015    Dr Margie Velásquez    COLONOSCOPY  1-10-11    CORONARY ARTERY BYPASS GRAFT  12.30.2015    [place]    DENTAL SURGERY      PROSTATECTOMY N/A 1/28/2019    ROBOTIC LAPAROSCOPIC RADICAL PROSTATECTOMY WITH BILATERAL LYMPH NODE DISSECTION AND RIGHT SIDE NERVE SPARING performed by Kiara Montana MD at [address] [place] [address]         Immunization History: All immunization history was reviewed by me today. There is no immunization history for the selected administration types on file for this patient. Family History: All family history was reviewed today.         Problem Relation Age of Onset    Heart Disease Father     Stroke Mother     Other Sister        Objective:       PHYSICAL EXAM:      Vitals:   Vitals:    04/04/19 0645 04/04/19 0725 04/04/19 0811 04/04/19 1119   BP: (!) 144/75  (!) 140/72 (!) 149/71   Pulse: 68 62 59   Resp: 16  16 16   Temp: 97.8 °F (36.6 °C)  97.8 °F (36.6 °C) 97.7 °F (36.5 °C)   TempSrc: Temporal  Temporal Temporal   SpO2: 95%  96% 100%   Weight:  197 lb 11.2 oz (89.7 kg)     Height:           Physical Exam   Constitutional: He is oriented to person, place, and time. He appears well-developed. HENT:   Head: Normocephalic and atraumatic. Mouth/Throat: Oropharynx is clear and moist. No oropharyngeal exudate. Eyes: Pupils are equal, round, and reactive to light. Conjunctivae and EOM are normal. Right eye exhibits no discharge. Left eye exhibits no discharge. No scleral icterus. Neck: Normal range of motion. Neck supple. Cardiovascular: Normal rate and regular rhythm. Exam reveals no friction rub. No murmur heard. Pulmonary/Chest: No stridor. No respiratory distress. He has no wheezes. He has no rales. Abdominal: Soft. Bowel sounds are normal. There is tenderness. There is no rebound and no guarding. Drainage in the TREVIN drain   Musculoskeletal: Normal range of motion. He exhibits no edema or tenderness. Lymphadenopathy:     He has no cervical adenopathy. He has no axillary adenopathy. Neurological: He is alert and oriented to person, place, and time. He exhibits normal muscle tone. Skin: Skin is warm and dry. No rash noted. He is not diaphoretic. No erythema. Psychiatric: He has a normal mood and affect. His behavior is normal.   Nursing note and vitals reviewed. Lines: All vascular access sites are healthy with no local erythema, discharge or tenderness. Intake and output:    I/O last 3 completed shifts:  In: -   Out: 1310 [Urine:1050; Drains:260]    Lab Data:   All available labs and old records have been reviewed by me.     CBC:  Recent Labs     04/02/19  0612 04/03/19  0610 04/04/19  0815   WBC 20.1* 20.1* 15.0*   RBC 3.81* 3.62* 3.65*   HGB 10.8* 10.2* 10.4*   HCT 32.1* 30.8* 30.8*    317 301   MCV 84.2 84.9 84.4   MCH 28.2 28.1 28.5   MCHC 33.5 33.0 33.8 RDW 14.4 14.6 14.3   BANDSPCT 5 6  --         BMP:  Recent Labs     04/02/19  0612 04/03/19  0610 04/04/19  0815    136 136   K 3.8 3.8 4.1    104 103   CO2 23 23 23   BUN 11 9 9   CREATININE 0.7* 0.7* 0.7*   CALCIUM 7.6* 7.7* 7.6*   GLUCOSE 125* 112* 118*        Hepatic Function Panel:   Lab Results   Component Value Date    ALKPHOS 285 04/02/2019    ALT 51 04/02/2019    AST 75 04/02/2019    PROT 5.1 04/02/2019    BILITOT 0.8 04/02/2019    BILIDIR 0.5 04/02/2019    IBILI 0.3 04/02/2019    LABALBU 2.1 04/02/2019       CPK: No results found for: CKTOTAL  ESR:   Lab Results   Component Value Date    SEDRATE 55 (H) 04/01/2019     CRP:   Lab Results   Component Value Date    .7 (H) 04/01/2019           Imaging: All pertinent images and reports for the current visit were reviewed by me during this visit. CT ABSCESS DRAINAGE W CATH PLACEMENT S&I   Final Result   Technically successful CT-guided placement of a 12 Finnish locking pigtail   drainage catheter into the deep left iliopsoas fluid collection. CT ABDOMEN PELVIS W IV CONTRAST Additional Contrast? None   Final Result   1. Large lobular fluid collection along the left pelvic sidewall involving   the distal psoas and iliacus muscles measuring approximately 16.2 x 8.1 x 4.1   cm with mild adjacent stranding. This may represent an abscess or liquefying   hematoma. 2. Status post prostatectomy. Medications: All current and past medications were reviewed.      cetirizine  10 mg Oral Daily    fluticasone  1 spray Each Nare Daily    ceFAZolin  3 g Intravenous Q8H    lidocaine 1 % injection  5 mL Intradermal Once    sodium chloride flush  10 mL Intravenous 2 times per day    pantoprazole  40 mg Oral QAM AC    aspirin  81 mg Oral Daily    atorvastatin  10 mg Oral Nightly    sennosides-docusate sodium  1 tablet Oral BID    sodium chloride flush  10 mL Intravenous 2 times per day    enoxaparin  40 mg Subcutaneous Daily  sodium chloride 100 mL/hr at 04/04/19 1150       melatonin, sodium chloride flush, guaiFENesin-dextromethorphan, benzonatate, morphine, sodium chloride flush, ondansetron, potassium chloride **OR** potassium alternative oral replacement **OR** potassium chloride, magnesium sulfate, acetaminophen, oxyCODONE-acetaminophen    Current antibiotics: All antibiotics and their doses were reviewed by me today    Recent Abx Admin                   ceFAZolin (ANCEF) 3 g in dextrose 5 % 100 mL IVPB (g) 3 g New Bag 04/04/19 1301     3 g New Bag  0633     3 g New Bag 04/03/19 2122     3 g New Bag  1425                Known drug Allergies: All allergies were reviewed and updated    No Known Allergies        Please note that this chart was generated using Dragon dictation software. Although every effort was made to ensure the accuracy of this automated transcription, some errors in transcription may have occurred inadvertently. If you may need any clarification, please do not hesitate to contact me through EPIC or at the phone number provided below with my electronic signature.     Ty Garcia MD, MPH  4/4/2019, 1:30 PM  [facility] Infectious Disease   Office: [phone]  Fax: [phone]  Tuesday AM clinic:   [address] [facility], [facility] [address]  Thursday AM [id number] [facility]

## 2019-04-04 NOTE — PROGRESS NOTES
Pt's O2 sats are in 90s, but he feels very SOB on room air. He's on 2L O2. MD OK with that.  Electronically signed by Robin Bear RN on 4/4/2019 at 6:25 PM

## 2019-04-04 NOTE — PROGRESS NOTES
Urology Progress Note  [facility]    Provider: Danielle Griffith MD Patient ID:  Admission Date: 3/30/2019 Name: Gerard Raphael Date: 3/30/2019 MRN: [id number]   Patient Location: [address] [place], [place] [id number] : 1950  Attending: Maru Akers MD Date of Service: 2019  PCP: Jean Ellis DO     Diagnoses:  Lymphocele with infection  Prostate cancer    Assessment/Plan:  -monitor drain (110 seropurulent output last 8 hr shift), plan to repeat imaging to ensure abscess collection resolved, possible abscessogram.   - WBC down to 15K from 20K   - appreciate ID, gen surg, medicine assistance  - Urology will continue to follow closely     The patient had a chance to ask questions which were answered. he understands the above plan. Subjective:   Jojo Monday is a 79 y.o. male. He was seen and examined this morning. Today we discussed his drain, prostate cancer and hospital course. He had a chance to ask questions. Pt feels much better overall. Objective:   Vitals:  Vitals:    19 0811   BP: (!) 140/72   Pulse: 62   Resp: 16   Temp: 97.8 °F (36.6 °C)   SpO2: 96%       Intake/Output Summary (Last 24 hours) at 2019 0955  Last data filed at 2019 0210  Gross per 24 hour   Intake --   Output 1310 ml   Net -1310 ml     Physical Exam:  Gen: Alert and oriented x3, no acute distress  CV: Regular rate   Resp: unlabored respirations  Abd: Soft, non-distended, non-tender, no masses  Ext: no peripheral edema noted, moves upper and lower extremities spontaneously  Skin: warm and well perfused, no rashes noted on the face, or arms.      Drain LLQ seropurulent    Labs:  Lab Results   Component Value Date    WBC 15.0 (H) 2019    HGB 10.4 (L) 2019    HCT 30.8 (L) 2019    MCV 84.4 2019     2019     Lab Results   Component Value Date    CREATININE 0.7 (L) 2019    BUN 9 2019     2019    K 4.1 2019     2019    CO2 23 2019 Melva Manual MD  4/4/2019

## 2019-04-04 NOTE — PROGRESS NOTES
Physical Therapy  Facility/Department: [address]  Daily Treatment Note  NAME: Nelly Davidson  : 1950  MRN: [id number]    Date of Service: 2019    Discharge Recommendations:  Nelly Davidson scored a 23/24 on the AM-PAC short mobility form. At this time, no further PT is recommended upon discharge due to patient functioning at baseline and safe to return home. Recommend patient returns to prior setting with prior services. PT Equipment Recommendations  Equipment Needed: Yes  Mobility Devices: Marisela Campos: Rolling  Other: Pt requires RW for safety ambulation in his home    Patient Diagnosis(es): The encounter diagnosis was Psoas abscess ([facility] [address] has a past medical history of CAD (coronary artery disease), Mixed hyperlipidemia, and Prostate cancer ([facility] [address] has a past surgical history that includes Dental surgery; Colonoscopy (1-10-11); Cardiac catheterization (2015); Coronary artery bypass graft (2015); and Prostatectomy (N/A, 2019). Restrictions  Restrictions/Precautions  Restrictions/Precautions: Fall Risk(Low)  Required Braces or Orthoses?: No  Position Activity Restriction  Other position/activity restrictions: 78 yo man with hx prostate ca (s/p prostatectomy 2019), CAD, elevated lipids; developed severe L hip pain 3/27/19, unable to ambulate; fluid collection L lower abdomen and distal psoas; CT guided abscess drainage on 3/31; catheter in place to drain  Subjective   General  Chart Reviewed: Yes  Response To Previous Treatment: Patient with no complaints from previous session. Family / Caregiver Present: No  Subjective  Subjective: Pt agreeable to therapy session today. General Comment  Comments: Pt supine in bed upon arrival. Alarms not in place. Pain Screening  Patient Currently in Pain: Yes  Pain Assessment  Pain Assessment: 0-10  Pain Level: 2  Pain Type: Acute pain  Pain Location: Hip  Pain Orientation: Right; Anterior  Pain Descriptors: Aching  Pain Frequency: Intermittent  Vital Signs  Patient Currently in Pain: Yes  Patient Observation  Observations: At end of session, pt wanted to check SpO2, which ready 98%. Orientation  Orientation  Overall Orientation Status: Within Normal Limits    Objective   Bed mobility  Supine to Sit: Modified independent  Scooting: Independent  Comment: HOB elevated  Transfers  Sit to Stand: Modified independent(x 1 from EOB)  Stand to sit: Modified independent(x 1 to chair)  Ambulation  Ambulation?: Yes  Ambulation 1  Surface: level tile  Device: Rolling Walker  Assistance: Supervision  Quality of Gait: increased trunk flexion, decreased step length on L, decreased hip extension of L, decreased anderson  Distance: 300 ft  Comments: included turning and no LOB noted. Pt states that he does not need the RW to ambulate within the room but needs to when ambulating longer distances d/t L anterior hip musculature becoming fatigued. Stairs/Curb  Stairs?: No  Stairs  Comment: Pt stated that he did stairs the other day and decided he didn't want to perform them again. Balance  Posture: Fair(flexed posture d/t L hip pain)  Sitting - Static: Good  Sitting - Dynamic: Good  Standing - Static: Good  Standing - Dynamic: Good;-(with RW)  Comments: requires AD during prolonged ambulation                           Assessment   Body structures, Functions, Activity limitations: Decreased functional mobility ; Decreased ROM; Decreased strength;Decreased balance; Increased Pain;Decreased endurance  Assessment: Pt continues to present with above deficits, specifially d/t pain and lack of ROM in L hip. Therefore, skilled PT should be continued in order to safely return pt to PLOF.   Treatment Diagnosis: Impaired gait  Prognosis: Good  Decision Making: Medium Complexity  Patient Education: PT eval, POC, HEP, gait mechanics, discharge recommendations  REQUIRES PT FOLLOW UP: Yes  Activity Tolerance  Activity Tolerance: Patient Tolerated treatment well

## 2019-04-04 NOTE — PLAN OF CARE
Problem: Pain:  Goal: Pain level will decrease  Description  Pain level will decrease  Outcome: Met This Shift  Note:   Pt not c/o pain     Problem: Falls - Risk of:  Goal: Will remain free from falls  Description  Will remain free from falls  Note:   Has remained free of falls.  Independent

## 2019-04-04 NOTE — PROGRESS NOTES
Pt developed red, spotty, itchy rash covering whole back. Wife applied lotion and pt states the itching is gone. MD notified.  Electronically signed by Leonidas Thomas RN on 4/4/2019 at 4:08 PM

## 2019-04-05 ENCOUNTER — APPOINTMENT (OUTPATIENT)
Dept: GENERAL RADIOLOGY | Age: 69
DRG: 871 | End: 2019-04-05
Payer: COMMERCIAL

## 2019-04-05 LAB
ANAEROBIC CULTURE: ABNORMAL
ANION GAP SERPL CALCULATED.3IONS-SCNC: 10 MMOL/L (ref 3–16)
BASOPHILS ABSOLUTE: 0 K/UL (ref 0–0.2)
BASOPHILS RELATIVE PERCENT: 0 %
BUN BLDV-MCNC: 8 MG/DL (ref 7–20)
CALCIUM SERPL-MCNC: 7.7 MG/DL (ref 8.3–10.6)
CHLORIDE BLD-SCNC: 104 MMOL/L (ref 99–110)
CO2: 24 MMOL/L (ref 21–32)
CREAT SERPL-MCNC: 0.7 MG/DL (ref 0.8–1.3)
EOSINOPHILS ABSOLUTE: 0.1 K/UL (ref 0–0.6)
EOSINOPHILS RELATIVE PERCENT: 1 %
GFR AFRICAN AMERICAN: >60
GFR NON-AFRICAN AMERICAN: >60
GLUCOSE BLD-MCNC: 98 MG/DL (ref 70–99)
GRAM STAIN RESULT: ABNORMAL
HCT VFR BLD CALC: 31.8 % (ref 40.5–52.5)
HEMOGLOBIN: 10.4 G/DL (ref 13.5–17.5)
LYMPHOCYTES ABSOLUTE: 1.2 K/UL (ref 1–5.1)
LYMPHOCYTES RELATIVE PERCENT: 10 %
MCH RBC QN AUTO: 27.8 PG (ref 26–34)
MCHC RBC AUTO-ENTMCNC: 32.8 G/DL (ref 31–36)
MCV RBC AUTO: 84.7 FL (ref 80–100)
METAMYELOCYTES RELATIVE PERCENT: 1 %
MONOCYTES ABSOLUTE: 0.5 K/UL (ref 0–1.3)
MONOCYTES RELATIVE PERCENT: 4 %
MYELOCYTE PERCENT: 1 %
NEUTROPHILS ABSOLUTE: 9.9 K/UL (ref 1.7–7.7)
NEUTROPHILS RELATIVE PERCENT: 83 %
ORGANISM: ABNORMAL
PDW BLD-RTO: 14.6 % (ref 12.4–15.4)
PLATELET # BLD: 306 K/UL (ref 135–450)
PLATELET SLIDE REVIEW: ADEQUATE
PMV BLD AUTO: 8 FL (ref 5–10.5)
POTASSIUM SERPL-SCNC: 4.2 MMOL/L (ref 3.5–5.1)
RBC # BLD: 3.75 M/UL (ref 4.2–5.9)
RBC # BLD: NORMAL 10*6/UL
SLIDE REVIEW: ABNORMAL
SODIUM BLD-SCNC: 138 MMOL/L (ref 136–145)
TOXIC GRANULATION: PRESENT
WBC # BLD: 11.6 K/UL (ref 4–11)
WOUND/ABSCESS: ABNORMAL

## 2019-04-05 PROCEDURE — 6360000002 HC RX W HCPCS: Performed by: INTERNAL MEDICINE

## 2019-04-05 PROCEDURE — 2060000000 HC ICU INTERMEDIATE R&B

## 2019-04-05 PROCEDURE — 87040 BLOOD CULTURE FOR BACTERIA: CPT

## 2019-04-05 PROCEDURE — 87086 URINE CULTURE/COLONY COUNT: CPT

## 2019-04-05 PROCEDURE — 6370000000 HC RX 637 (ALT 250 FOR IP): Performed by: INTERNAL MEDICINE

## 2019-04-05 PROCEDURE — 71045 X-RAY EXAM CHEST 1 VIEW: CPT

## 2019-04-05 PROCEDURE — 80048 BASIC METABOLIC PNL TOTAL CA: CPT

## 2019-04-05 PROCEDURE — 6370000000 HC RX 637 (ALT 250 FOR IP): Performed by: PHYSICIAN ASSISTANT

## 2019-04-05 PROCEDURE — 6370000000 HC RX 637 (ALT 250 FOR IP): Performed by: HOSPITALIST

## 2019-04-05 PROCEDURE — 99232 SBSQ HOSP IP/OBS MODERATE 35: CPT | Performed by: SURGERY

## 2019-04-05 PROCEDURE — 2580000003 HC RX 258: Performed by: NURSE PRACTITIONER

## 2019-04-05 PROCEDURE — 99232 SBSQ HOSP IP/OBS MODERATE 35: CPT | Performed by: INTERNAL MEDICINE

## 2019-04-05 PROCEDURE — 2580000003 HC RX 258: Performed by: INTERNAL MEDICINE

## 2019-04-05 PROCEDURE — 6370000000 HC RX 637 (ALT 250 FOR IP): Performed by: NURSE PRACTITIONER

## 2019-04-05 PROCEDURE — 85025 COMPLETE CBC W/AUTO DIFF WBC: CPT

## 2019-04-05 PROCEDURE — 6360000002 HC RX W HCPCS: Performed by: NURSE PRACTITIONER

## 2019-04-05 RX ORDER — LACTOBACILLUS RHAMNOSUS GG 10B CELL
1 CAPSULE ORAL 2 TIMES DAILY WITH MEALS
Qty: 30 CAPSULE | Refills: 0 | Status: SHIPPED | OUTPATIENT
Start: 2019-04-05 | End: 2019-04-20

## 2019-04-05 RX ORDER — CIPROFLOXACIN 500 MG/1
500 TABLET, FILM COATED ORAL EVERY 12 HOURS SCHEDULED
Qty: 20 TABLET | Refills: 0 | Status: SHIPPED | OUTPATIENT
Start: 2019-04-05 | End: 2019-04-15

## 2019-04-05 RX ORDER — LACTOBACILLUS RHAMNOSUS GG 10B CELL
1 CAPSULE ORAL 2 TIMES DAILY WITH MEALS
Status: DISCONTINUED | OUTPATIENT
Start: 2019-04-05 | End: 2019-04-07 | Stop reason: HOSPADM

## 2019-04-05 RX ORDER — CIPROFLOXACIN 500 MG/1
500 TABLET, FILM COATED ORAL EVERY 12 HOURS SCHEDULED
Status: DISCONTINUED | OUTPATIENT
Start: 2019-04-05 | End: 2019-04-07 | Stop reason: HOSPADM

## 2019-04-05 RX ADMIN — Medication 1 CAPSULE: at 17:13

## 2019-04-05 RX ADMIN — ATORVASTATIN CALCIUM 10 MG: 10 TABLET, FILM COATED ORAL at 22:32

## 2019-04-05 RX ADMIN — Medication 10 ML: at 13:48

## 2019-04-05 RX ADMIN — SENNOSIDES AND DOCUSATE SODIUM 1 TABLET: 8.6; 5 TABLET ORAL at 09:02

## 2019-04-05 RX ADMIN — Medication 10 ML: at 22:05

## 2019-04-05 RX ADMIN — Medication 3 G: at 22:04

## 2019-04-05 RX ADMIN — Medication 3 G: at 13:47

## 2019-04-05 RX ADMIN — BENZONATATE 100 MG: 100 CAPSULE ORAL at 17:13

## 2019-04-05 RX ADMIN — CIPROFLOXACIN HYDROCHLORIDE 500 MG: 500 TABLET, FILM COATED ORAL at 22:15

## 2019-04-05 RX ADMIN — Medication 1 CAPSULE: at 12:10

## 2019-04-05 RX ADMIN — GUAIFENESIN AND DEXTROMETHORPHAN 5 ML: 100; 10 SYRUP ORAL at 22:10

## 2019-04-05 RX ADMIN — Medication 10 ML: at 09:01

## 2019-04-05 RX ADMIN — MELATONIN TAB 3 MG 6 MG: 3 TAB at 22:10

## 2019-04-05 RX ADMIN — Medication 3 G: at 05:18

## 2019-04-05 RX ADMIN — ASPIRIN 81 MG 81 MG: 81 TABLET ORAL at 09:02

## 2019-04-05 RX ADMIN — SODIUM CHLORIDE: 9 INJECTION, SOLUTION INTRAVENOUS at 09:01

## 2019-04-05 RX ADMIN — Medication 10 ML: at 22:10

## 2019-04-05 RX ADMIN — ENOXAPARIN SODIUM 40 MG: 40 INJECTION SUBCUTANEOUS at 09:01

## 2019-04-05 RX ADMIN — PANTOPRAZOLE SODIUM 40 MG: 40 TABLET, DELAYED RELEASE ORAL at 05:19

## 2019-04-05 RX ADMIN — DIPHENHYDRAMINE HYDROCHLORIDE 5 MG: 50 INJECTION, SOLUTION INTRAMUSCULAR; INTRAVENOUS at 09:02

## 2019-04-05 RX ADMIN — ACETAMINOPHEN 650 MG: 325 TABLET, FILM COATED ORAL at 22:10

## 2019-04-05 RX ADMIN — CETIRIZINE HYDROCHLORIDE 10 MG: 10 TABLET, FILM COATED ORAL at 09:01

## 2019-04-05 RX ADMIN — CIPROFLOXACIN HYDROCHLORIDE 500 MG: 500 TABLET, FILM COATED ORAL at 12:10

## 2019-04-05 ASSESSMENT — ENCOUNTER SYMPTOMS
DIARRHEA: 0
EYE DISCHARGE: 0
SHORTNESS OF BREATH: 0
EYE REDNESS: 0
SORE THROAT: 0
TROUBLE SWALLOWING: 0
CONSTIPATION: 0
BACK PAIN: 0
WHEEZING: 0
RHINORRHEA: 0
NAUSEA: 0
COUGH: 0

## 2019-04-05 ASSESSMENT — PAIN SCALES - GENERAL
PAINLEVEL_OUTOF10: 0

## 2019-04-05 NOTE — PROGRESS NOTES
Has edema in his feet up to his thighs, pitting. Crackles in lung bases. BP elevated. MD notified.  Electronically signed by Candelarai Foreman RN on 4/5/2019 at 12:01 PM

## 2019-04-05 NOTE — PROGRESS NOTES
ID messaged regarding medications the pt is said to be taking but are not ordered.  Electronically signed by Naomia Bosworth, RN on 4/5/2019 at 9:54 AM

## 2019-04-05 NOTE — PROGRESS NOTES
Pt stated that he had some blood in his stool. States he has hemorrhoids. MD notified. WIll monitor.  Electronically signed by Chiara Pham RN on 4/5/2019 at 1:53 PM

## 2019-04-05 NOTE — PROGRESS NOTES
Infectious Diseases   Progress Note      Admission Date: 3/30/2019  Hospital Day: Hospital Day: 7  Attending: Maru Akers MD  Date of service: 4/5/2019    Presenting complaint:   Chief Complaint   Patient presents with    Hip Pain     prostate removed 1/28th, patient reporting everything was great after surgery, then this week he thinks he must have caught the flu where hes had high fevers, and his left hip has been hurting so severely he hasnt been able to walk.         Chief complaint/ Reason for consult: The patient was seen today for the following:    · Sepsis with high fever and marked leukocytosis   · Complicated left pelvic/psoas abscess status postoperative ileus drainage with [address] [place] [address] drain placement - culture positive for MSSA  · Left hip pain  · Mixed hyperlipidemia    Microbiology:        I have reviewed all available micro lab data and cultures    · Blood culture (2/2) - collected on 3/30/2019: Negative  · Body fluid (left pelvic abscess fluid ) culture - collected on 3/30/2019: MSSA    Staphylococcus aureus (1)     Antibiotic Interpretation JING Status    ceFAZolin Sensitive <=4 mcg/mL     ciprofloxacin Intermediate 2 mcg/mL     clindamycin Sensitive <=0.5 mcg/mL     erythromycin Sensitive <=0.5 mcg/mL     oxacillin Sensitive 0.5 mcg/mL     tetracycline Sensitive <=4 mcg/mL     trimethoprim-sulfamethoxazole Sensitive <=0.5/9.5 mcg/mL           · Urine culture  - collected on 3/29/2019: Klebsiella    Klebsiella oxytoca (1)     Antibiotic Interpretation JING Status    amoxicillin-clavulanate Sensitive <=8/4 mcg/mL     ampicillin Resistant >16 mcg/mL     ceFAZolin Resistant 8 mcg/mL     cefepime Sensitive <=2 mcg/mL     cefotaxime Sensitive <=2 mcg/mL     cefTRIAXone Sensitive <=1 mcg/mL     cefuroxime Sensitive <=4 mcg/mL     ciprofloxacin Sensitive <=1 mcg/mL     gentamicin Sensitive <=4 mcg/mL     meropenem Sensitive <=1 mcg/mL     nitrofurantoin Intermediate 64 mcg/mL so far    The patient has apparently not been getting ciprofloxacin. Plan:     Diagnostic Workup:    · Continue to follow  fever curve, WBC count and blood cultures  · Follow up on  liver and renal function  · Will order repeat urine culture  · Will order one set of blood culture    Antimicrobials:    · Will Continue IV cefazolin 3 g every 8 hours  · Will recommend a 10 day course of ciprofloxacin  · Start probiotic twice daily  · If the patient remains afebrile over the weekend and abscessogram shows that abscess is improving in size, can be discharged over the weekend. My infusion orders are in the [facility]  · DVT prophylaxis  · Discussed all above with patient and RN  ·     Drug Monitoring:    · Continue monitoring for antibiotic toxicity as folCMP, QTc interval  · Continue to watch for following: new or worsening fever, new hypotension, hives, lip swelling and redness or purulence at vascular access sites. I/v access Management:    · Continue to monitor i.v access sites for erythema, induration, discharge or tenderness. · As always, continue efforts to minimize tubes/lines/drains as clinically appropriate to reduce chances of line associated infections. Patient education and counseling:        · The patient was educated in detail about the side-effects of various antibiotics and things to watch for like new rashes, lip swelling, severe reaction, worsening diarrhea, break through fever etc.  · Discussed patient's condition and what to expect. All of the patient's questions were addressed in a satisfactory manner and patient verbalized understanding all instructions. Thank you for involving me in the care of your patient. I will continue to follow. If you have anyadditional questions, please do not hesitate to contact me. Subjective: Interval history: Patient was seen and examined at bedside. Interval history was obtained. Patient feels tired.   Still has ongoing drainage in the TREVIN drain in the left groin abscess. He is tolerating antibiotics okay. PICC line is working okay. REVIEW OF SYSTEMS:  *    Review of Systems   Constitutional: Negative for chills, diaphoresis and fever. HENT: Negative for ear discharge, ear pain, rhinorrhea, sore throat and trouble swallowing. Eyes: Negative for discharge and redness. Respiratory: Negative for cough, shortness of breath and wheezing. Cardiovascular: Negative for chest pain and leg swelling. Gastrointestinal: Positive for abdominal pain (Lower abdominal area). Negative for constipation, diarrhea and nausea. Endocrine: Negative for polyuria. Genitourinary: Negative for dysuria, flank pain, frequency, hematuria and urgency. Musculoskeletal: Negative for back pain and myalgias. Skin: Negative for rash. Neurological: Negative for dizziness, seizures and headaches. Hematological: Does not bruise/bleed easily. Psychiatric/Behavioral: Negative for hallucinations and suicidal ideas. All other systems reviewed and are negative. Past Medical History: All past medical history reviewed today. Past Medical History:   Diagnosis Date    CAD (coronary artery disease)     Mixed hyperlipidemia 11/26/2018    Prostate cancer ([facility] [address] 1/21/2019       Past Surgical History: All past surgical history was reviewed today. Past Surgical History:   Procedure Laterality Date    CARDIAC CATHETERIZATION  12/28/2015    Dr Joleen Keen    COLONOSCOPY  1-10-11    CORONARY ARTERY BYPASS GRAFT  12.30.2015    [place]    DENTAL SURGERY      PROSTATECTOMY N/A 1/28/2019    ROBOTIC LAPAROSCOPIC RADICAL PROSTATECTOMY WITH BILATERAL LYMPH NODE DISSECTION AND RIGHT SIDE NERVE SPARING performed by Susan Ramsey MD at [address] [place] [address]         Immunization History: All immunization history was reviewed by me today. There is no immunization history for the selected administration types on file for this patient. Family History:  All family history was reviewed and old records have been reviewed by me. CBC:  Recent Labs     04/03/19  0610 04/04/19  0815 04/05/19  0522   WBC 20.1* 15.0* 11.6*   RBC 3.62* 3.65* 3.75*   HGB 10.2* 10.4* 10.4*   HCT 30.8* 30.8* 31.8*    301 306   MCV 84.9 84.4 84.7   MCH 28.1 28.5 27.8   MCHC 33.0 33.8 32.8   RDW 14.6 14.3 14.6   BANDSPCT 6  --   --         BMP:  Recent Labs     04/03/19  0610 04/04/19  0815 04/05/19  0522    136 138   K 3.8 4.1 4.2    103 104   CO2 23 23 24   BUN 9 9 8   CREATININE 0.7* 0.7* 0.7*   CALCIUM 7.7* 7.6* 7.7*   GLUCOSE 112* 118* 98        Hepatic Function Panel:   Lab Results   Component Value Date    ALKPHOS 285 04/02/2019    ALT 51 04/02/2019    AST 75 04/02/2019    PROT 5.1 04/02/2019    BILITOT 0.8 04/02/2019    BILIDIR 0.5 04/02/2019    IBILI 0.3 04/02/2019    LABALBU 2.1 04/02/2019       CPK: No results found for: CKTOTAL  ESR:   Lab Results   Component Value Date    SEDRATE 55 (H) 04/01/2019     CRP:   Lab Results   Component Value Date    .7 (H) 04/01/2019           Imaging: All pertinent images and reports for the current visit were reviewed by me during this visit. CT ABSCESS DRAINAGE W CATH PLACEMENT S&I   Final Result   Technically successful CT-guided placement of a 12 Romansh locking pigtail   drainage catheter into the deep left iliopsoas fluid collection. CT ABDOMEN PELVIS W IV CONTRAST Additional Contrast? None   Final Result   1. Large lobular fluid collection along the left pelvic sidewall involving   the distal psoas and iliacus muscles measuring approximately 16.2 x 8.1 x 4.1   cm with mild adjacent stranding. This may represent an abscess or liquefying   hematoma. 2. Status post prostatectomy. Medications: All current and past medications were reviewed.      ciprofloxacin  500 mg Oral 2 times per day    cetirizine  10 mg Oral Daily    fluticasone  1 spray Each Nare Daily    ceFAZolin  3 g Intravenous Q8H    lidocaine 1 % injection  5

## 2019-04-05 NOTE — PROGRESS NOTES
[facility] Laparoscopic [facility]        Progress Note    Patient Name: Jojo Monday  MRN: [id number]  YOB: 1950  Date of Evaluation: 2019    Chief Complaint: Abdominal/groin pain    Subjective:  No acute events overnight  Pain generally well controlled; increased pain with standing upright  Tolerating diet without complaints of nausea/vomiting  No reported issues with drain, continued high output  Up to chair at this time      Vital Signs:  Patient Vitals for the past 24 hrs:   BP Temp Temp src Pulse Resp SpO2 Weight   19 1157 (!) 152/81 99.4 °F (37.4 °C) Temporal 63 16 97 % --   19 0758 135/77 98.7 °F (37.1 °C) Oral 62 16 97 % --   19 0702 -- -- -- -- -- -- 198 lb 1.6 oz (89.9 kg)   19 0517 (!) 158/80 98.9 °F (37.2 °C) Oral 69 16 94 % --   19 0003 (!) 143/72 98.8 °F (37.1 °C) Oral 54 16 96 % --   19 -- 100.6 °F (38.1 °C) Oral -- -- -- --   19 1948 (!) 166/72 100.1 °F (37.8 °C) Oral 67 16 95 % --   19 1604 (!) 154/79 99.8 °F (37.7 °C) Oral 63 16 98 % --      TEMPERATURE HISTORY 24H: Temp (24hrs), Av.5 °F (37.5 °C), Min:98.7 °F (37.1 °C), Max:100.6 °F (38.1 °C)    BLOOD PRESSURE HISTORY: Systolic (22PAB), GI , Min:135 , LXZ:604    Diastolic (62OCS), JJE:08, Min:71, Max:81      Intake/Output:  I/O last 3 completed shifts:   In: 10 [I.V.:10]  Out:  [Urine:1700; Drains:275]  I/O this shift:  In: -   Out: 90 [Drains:90]  Drain/tube Output:  Closed/Suction Drain Left LLQ Bulb 12 Korean-Output (ml): 50 ml    Physical Exam:  General: awake, alert, oriented to  person, place, time  Lungs: clear to auscultation  Heart: RRR  Abdomen: soft, mildly tenderness at drain site only, nondistended, no masses or organomegaly   Drain: cloudy yellow output  SKIN:  no bruising or bleeding and normal skin color, texture, turgor      Labs:  CBC:    Recent Labs     19  0610 19  0815 19  0522   WBC 20.1* 15.0* 11.6*   HGB 10.2* 10.4* 10.4*   HCT 30.8* 30.8* 31.8*    301 306     BMP:    Recent Labs     04/03/19  0610 04/04/19  0815 04/05/19  0522    136 138   K 3.8 4.1 4.2    103 104   CO2 23 23 24   BUN 9 9 8   CREATININE 0.7* 0.7* 0.7*   GLUCOSE 112* 118* 98     Hepatic:   No results for input(s): AST, ALT, ALB, BILITOT, ALKPHOS in the last 72 hours. Amylase: No results for input(s): AMYLASE in the last 72 hours. Lipase: No results for input(s): LIPASE in the last 72 hours. Mag:      No results for input(s): MG in the last 72 hours. Phos:     No results for input(s): PHOS in the last 72 hours. Coags:   No results for input(s): INR, APTT in the last 72 hours. Cultures:  Anaerobic culture  No results for input(s): LABANAE in the last 72 hours. Blood culture  No results for input(s): BC in the last 72 hours. Blood culture 2  No results for input(s): BLOODCULT2 in the last 72 hours. Body fluid culture  No results for input(s): BLOODCULT2 in the last 72 hours. Surgical culture  No results for input(s): CXSURG in the last 72 hours. Fecal occult  No results for input(s): OCCULTBLDFEC in the last 72 hours. Gram stain  No results for input(s): LABGRAM in the last 72 hours. Stool culture 1  No results for input(s): CXST in the last 72 hours. Stool culture 2  No results for input(s): STOOLCULT2 in the last 72 hours. Urine culture  No results for input(s): LABURIN in the last 72 hours. Wound abscess  No results for input(s): WNDABS in the last 72 hours. Pathology:  NA    Imaging:  I have personally reviewed the following films:    Xr Hip Left (2-3 Views)    Result Date: 3/28/2019  EXAMINATION: 2 XRAY VIEWS OF THE LEFT HIP 3/28/2019 12:16 pm COMPARISON: None. HISTORY: ORDERING SYSTEM PROVIDED HISTORY: Left hip pain TECHNOLOGIST PROVIDED HISTORY: Reason for exam:->pain Ordering Physician Provided Reason for Exam: No known injury. Pain in crease of left leg. started 2 days ago. FINDINGS: The left hip is intact. No fracture or dislocation. There is mild degenerative change in both hip joints with sclerotic change to the acetabulum and mild osteophyte formation. Pelvic bones intact. Vascular phleboliths are present throughout the pelvis. No other significant soft tissue findings. No acute finding in the left hip. Ct Abscess Drainage W Cath Placement S&i    Result Date: 4/1/2019  PROCEDURE: CT GUIDED ABSCESS FLUID DRAIN MODERATE CONSCIOUS SEDATION 3/31/2019 HISTORY: ORDERING SYSTEM PROVIDED HISTORY: abdominal abscess TECHNOLOGIST PROVIDED HISTORY: Reason for exam:->abdominal abscess Ordering Physician Provided Reason for Exam: abdominal abscess drainage Acuity: Acute Type of Exam: Initial TECHNIQUE: Dose modulation, iterative reconstruction, and/or weight based adjustment of the mA/kV was utilized to reduce the radiation dose to as low as reasonably achievable. CONTRAST: None. SEDATION: 2 mgversed and 100 mcg fentanyl were titrated intravenously for moderate sedation monitored under my direction. Total intraservice time of sedation was 15 minutes. The patient's vital signs were monitored throughout the procedure and recorded in the patient's medical record by the nurse. FLUOROSCOPY DOSE AND TYPE OR TIME AND EXPOSURES: 123 CT images, 360.7 mGy DESCRIPTION OF PROCEDURE: Informed consent was obtained after a detailed explanation of the procedure including risks, benefits, and alternatives. Universal protocol was observed. Limited CT imaging was performed through the lower abdomen and pelvis. An appropriate skin entry site was selected. The left groin is prepped and draped in sterile fashion. 1% lidocaine local anesthesia was used. Using CT guidance, a 5 Tuval[place] needle sheath was advanced into the inferior iliopsoas/left pelvic sidewall collection. Wire was advanced into the collection.   The tract was dilated and a 12 Tu[place] locking pigtail drainage catheter was placed into than 1 cm too small to definitively characterize but likely to represent benign cysts. No obstructive uropathy. GI/Bowel: Normal appendix. Sigmoid colon diverticulosis. No bowel obstruction or abnormal bowel wall thickening. Pelvis: The urinary bladder is unremarkable. The prostate gland is not clearly visualized possibly status post prostatectomy. There is a large lobular fluid collection along the left pelvic sidewall partially involving the distal psoas and iliacus muscles measuring approximately 16.2 x 8.1 x 4.1 cm on sagittal image 106 and axial image 161. Adjacent fat stranding is noted. No pelvic or inguinal lymphadenopathy. No free intraperitoneal fluid in the pelvis. Peritoneum/Retroperitoneum: The abdominal aorta is normal in caliber with mild atherosclerotic vascular disease. No retroperitoneal or mesenteric lymphadenopathy is identified. No free air or fluid is seen in the abdomen. Bones/Soft Tissues: Status post bilateral vasectomy. Postoperative scarring in the anterior abdominal wall. Mild subcutaneous fat stranding in the left anterolateral abdominal wall. Status post median sternotomy. No acute osseous abnormality. 1. Large lobular fluid collection along the left pelvic sidewall involving the distal psoas and iliacus muscles measuring approximately 16.2 x 8.1 x 4.1 cm with mild adjacent stranding. This may represent an abscess or liquefying hematoma. 2. Status post prostatectomy. Us Dup Lower Extremity Left Kiran    Result Date: 3/29/2019  EXAMINATION: DUPLEX VENOUS ULTRASOUND OF THE LEFT LOWER EXTREMITY 3/29/2019 2:46 pm TECHNIQUE: Duplex ultrasound and Doppler images were obtained of the left lower extremity. COMPARISON: None.  HISTORY: ORDERING SYSTEM PROVIDED HISTORY: Left hip pain TECHNOLOGIST PROVIDED HISTORY: Ordering Physician Provided Reason for Exam: left hip, groin pain, recent prostate surgery 2/2019 FINDINGS: The visualized veins of the left lower extremity are patent and free of echogenic thrombus. The veins are normally compressible and have normal phasic flow. No evidence of DVT in the left lower extremity. Scheduled Meds:   ciprofloxacin  500 mg Oral 2 times per day    lactobacillus  1 capsule Oral BID WC    cetirizine  10 mg Oral Daily    fluticasone  1 spray Each Nare Daily    ceFAZolin  3 g Intravenous Q8H    lidocaine 1 % injection  5 mL Intradermal Once    sodium chloride flush  10 mL Intravenous 2 times per day    pantoprazole  40 mg Oral QAM AC    aspirin  81 mg Oral Daily    atorvastatin  10 mg Oral Nightly    sennosides-docusate sodium  1 tablet Oral BID    sodium chloride flush  10 mL Intravenous 2 times per day    enoxaparin  40 mg Subcutaneous Daily     Continuous Infusions:    PRN Meds:.diphenhydrAMINE, melatonin, sodium chloride flush, guaiFENesin-dextromethorphan, benzonatate, morphine, sodium chloride flush, ondansetron, potassium chloride **OR** potassium alternative oral replacement **OR** potassium chloride, magnesium sulfate, acetaminophen, oxyCODONE-acetaminophen      Assessment:  79 y.o. male admitted with   1. Psoas abscess (HCC)      Mr. Carter Marinelli is a 79 y.o. male who presents with   Left psoas abscess, s/p percutaneous drainage 3/31/19  Recent robot prostatectomy  CAD, s/p CABG      Plan:  1. Continue drain care/monitoring; if continues to do well and WBC continues to trend down, will likely plan for repeat CT imaging 1 week after discharge just prior to follow up  2. Diet as tolerated  3. Antibiotics per ID, planning for home IV antibiotics  4. Activity as tolerated, ambulate TID, up to chair for all meals  5. Pulmonary toilet, incentive spirometry  6. PRN analgesics and antiemetics--minimizing narcotics as tolerated  7. DVT prophylaxis with Lovenox  8. Management of medical comorbid etiologies per primary team and consulting services  9.  Disposition: Anticipate discharge home in the next 24-48 hours with TREVIN drain in place    EDUCATION:  Educated patient on plan of care and disease process--all questions answered. Plans discussed with patient and nursing. Reviewed and discussed with Dr. Pauly Jacinto. Signed:  Anat YingNIHARIKA CNP  4/5/2019 1:12 PM     I have personally performed a face to face diagnostic evaluation on this patient. I have interviewed and examined the patient and I agree with the assessment above. In summary, my findings and plan are the following:   Mr. Tia Maria is a 79 y.o. male who presents with   Left psoas abscess, s/p percutaneous drainage 3/31/19  Recent robot prostatectomy  CAD, s/p CABG     Plan:  1. Left psoas abscess s/p percutaneous drainage, still with high output, when drainage decreases will likely re-image, if patient ready to discharge prior to this he may be discharged with drain in place, timing yet to be determined  2. Antibiotics per ID, leukocytosis improving and bandemia resolved, if worsen again will need repeat CT  3. Tolerating diet  4. Pain medication as needed  5. No indication for surgical exploration at this time  6. Discharge planning, anticipate 1-2 days      Dustin Jacinto MD, FACS  4/5/2019  7:11 PM

## 2019-04-05 NOTE — PLAN OF CARE
Problem: Pain:  Goal: Pain level will decrease  Description  Pain level will decrease  Outcome: Met This Shift  Note:   Has not c/o pain this shift     Problem: Falls - Risk of:  Goal: Will remain free from falls  Description  Will remain free from falls  Outcome: Met This Shift  Note:   Has remained safe.  Independent

## 2019-04-05 NOTE — PROGRESS NOTES
Minnie Uriarte is a 79 y.o. male patient. 1. Psoas abscess [facility])      Past Medical History:   Diagnosis Date    CAD (coronary artery disease)     Mixed hyperlipidemia 11/26/2018    Prostate cancer ([facility] [address] 1/21/2019     No past surgical history pertinent negatives on file. Scheduled Meds:   cetirizine  10 mg Oral Daily    fluticasone  1 spray Each Nare Daily    ceFAZolin  3 g Intravenous Q8H    lidocaine 1 % injection  5 mL Intradermal Once    sodium chloride flush  10 mL Intravenous 2 times per day    pantoprazole  40 mg Oral QAM AC    aspirin  81 mg Oral Daily    atorvastatin  10 mg Oral Nightly    sennosides-docusate sodium  1 tablet Oral BID    sodium chloride flush  10 mL Intravenous 2 times per day    enoxaparin  40 mg Subcutaneous Daily     Continuous Infusions:   sodium chloride 100 mL/hr at 04/05/19 0901     PRN Meds:diphenhydrAMINE, melatonin, sodium chloride flush, guaiFENesin-dextromethorphan, benzonatate, morphine, sodium chloride flush, ondansetron, potassium chloride **OR** potassium alternative oral replacement **OR** potassium chloride, magnesium sulfate, acetaminophen, oxyCODONE-acetaminophen    No Known Allergies  Active Problems:    Psoas abscess (HCC)    Leukocytosis    Fever    Sepsis due to methicillin susceptible Staphylococcus aureus (HCC)    History of prostate cancer    Status post prostatectomy    Coronary artery disease due to lipid rich plaque    Overweight    Complicated UTI (urinary tract infection)    Receiving intravenous antibiotic treatment as outpatient    Weight loss counseling, encounter for  Resolved Problems:    * No resolved hospital problems. *    Blood pressure 135/77, pulse 62, temperature 98.7 °F (37.1 °C), temperature source Oral, resp. rate 16, height 5' 8\" (1.727 m), weight 198 lb 1.6 oz (89.9 kg), SpO2 97 %. Subjective:   Diet: Adequate intake. Activity level: Returning to normal.    Pain control: Well controlled.       Objective:  Vital signs (most recent): Blood pressure 135/77, pulse 62, temperature 98.7 °F (37.1 °C), temperature source Oral, resp. rate 16, height 5' 8\" (1.727 m), weight 198 lb 1.6 oz (89.9 kg), SpO2 97 %. General appearance: Comfortable. Assessment:   Condition: In stable condition.        infected lymphocele  Sp rrp  Staph infectioin    recc    Wbc down  Cont antibx and drain  Repeat ct next week    Bri Montana MD  4/5/2019

## 2019-04-05 NOTE — PROGRESS NOTES
Hospitalist Progress Note      PCP: Rise Cooler, DO    Date of Admission: 3/30/2019    Chief Complaint: Groin pain      Subjective:   Denies pain, fever, chills. Intermittent chronic cough. Drain in place still with output  WBC down to 11 today. Medications:  Reviewed    Infusion Medications     Scheduled Medications    ciprofloxacin  500 mg Oral 2 times per day    lactobacillus  1 capsule Oral BID WC    cetirizine  10 mg Oral Daily    fluticasone  1 spray Each Nare Daily    ceFAZolin  3 g Intravenous Q8H    lidocaine 1 % injection  5 mL Intradermal Once    sodium chloride flush  10 mL Intravenous 2 times per day    pantoprazole  40 mg Oral QAM AC    aspirin  81 mg Oral Daily    atorvastatin  10 mg Oral Nightly    sennosides-docusate sodium  1 tablet Oral BID    sodium chloride flush  10 mL Intravenous 2 times per day    enoxaparin  40 mg Subcutaneous Daily     PRN Meds: diphenhydrAMINE, melatonin, sodium chloride flush, guaiFENesin-dextromethorphan, benzonatate, morphine, sodium chloride flush, ondansetron, potassium chloride **OR** potassium alternative oral replacement **OR** potassium chloride, magnesium sulfate, acetaminophen, oxyCODONE-acetaminophen      Intake/Output Summary (Last 24 hours) at 4/5/2019 1204  Last data filed at 4/5/2019 0801  Gross per 24 hour   Intake 10 ml   Output 1965 ml   Net -1955 ml       Exam:    BP (!) 152/81   Pulse 63   Temp 99.4 °F (37.4 °C) (Temporal)   Resp 16   Ht 5' 8\" (1.727 m)   Wt 198 lb 1.6 oz (89.9 kg)   SpO2 97%   BMI 30.12 kg/m²     Gen/overall appearance: Not in acute distress. Alert. Head: Normocephalic, atraumatic  Eyes: EOMI, no scleral icterus  CVS: regular rate and rhythm, Normal S1S2  Pulm: Clear to auscultation bilaterally.  No crackles/wheezes  Gastrointestinal: Soft, nontender, nondistended, no guarding or rebound  Extremities: L groin drain in place, bilat LE edema  Neuro: No gross focal deficits noted  Skin: Warm, dry    Labs:   Recent Labs     04/03/19  0610 04/04/19  0815 04/05/19  0522   WBC 20.1* 15.0* 11.6*   HGB 10.2* 10.4* 10.4*   HCT 30.8* 30.8* 31.8*    301 306     Recent Labs     04/03/19  0610 04/04/19  0815 04/05/19  0522    136 138   K 3.8 4.1 4.2    103 104   CO2 23 23 24   BUN 9 9 8   CREATININE 0.7* 0.7* 0.7*   CALCIUM 7.7* 7.6* 7.7*     No results for input(s): AST, ALT, BILIDIR, BILITOT, ALKPHOS in the last 72 hours. No results for input(s): INR in the last 72 hours. No results for input(s): Eward Pong in the last 72 hours. Assessment/Plan:    Active Hospital Problems    Diagnosis Date Noted    Weight loss counseling, encounter for [R11.5]     Complicated UTI (urinary tract infection) [N39.0]     Receiving intravenous antibiotic treatment as outpatient [Z79.2]     Sepsis due to methicillin susceptible Staphylococcus aureus ([address] [A41.01]     History of prostate cancer [Z85.46]     Status post prostatectomy [Z90.79]     Coronary artery disease due to lipid rich plaque [I25.10, I25.83]     Overweight [E66.3]     Leukocytosis [D72.829] 03/31/2019    Fever [R50.9] 03/31/2019    Psoas abscess ([facility] [address] [K68.12] 03/30/2019     Psoas abscess s/p for IR guided prec drain placement. Culture remarkable for MSSA  - PICC placed  - IV cefazolin per ID recs  - drain management per GS  - pain control     Klebsiella UTI  - po cipro per ID recs     Sepsis 2/2 above. POA. Now resolved     Hx of prostate cancer s/p prostatectomy  - per urology     Chronic cough. Ongoing for years.   Likely allergic   - no gross signs of infection  - on empiric PPI  - follows as outpateint    LE edema  - d/c IVFs  - CXR     Hyponatremia; now resolved     CAD s/p CABG    DVT Prophylaxis: lovenox  Diet: DIET GENERAL;  Code Status: Full Code    Dispo - Inpt, 1-2 days    Micheline Bashir MD

## 2019-04-05 NOTE — PROGRESS NOTES
Nutrition Assessment (Low Risk)    Type and Reason for Visit: Initial    Nutrition Recommendations:   No recs at this time     Nutrition Assessment:     LOS assessement. Pt adequately nourished as evidenced by adequate po intake on general diet and stable wt. Pt has no nutrition concerns. Not at risk for nutrition compromise. Will continue to monitor for changes in status.     Malnutrition Assessment:  · Malnutrition Status: No malnutrition    Nutrition Risk Level   Risk Level: Low    Nutrition Diagnosis:   · Problem: No nutrition diagnosis at this time    Nutrition Intervention:  Food and/or Delivery: Continue current diet  Nutrition Education/Counseling/Coordination of Care:  Continued Inpatient Monitoring, Education Not Indicated      Electronically signed by Marvin Cain RD, LD on 4/5/19 at 3:14 PM  Contact Number: [phone]

## 2019-04-06 LAB
ANION GAP SERPL CALCULATED.3IONS-SCNC: 8 MMOL/L (ref 3–16)
BASOPHILS ABSOLUTE: 0.1 K/UL (ref 0–0.2)
BASOPHILS RELATIVE PERCENT: 1 %
BUN BLDV-MCNC: 8 MG/DL (ref 7–20)
CALCIUM SERPL-MCNC: 7.9 MG/DL (ref 8.3–10.6)
CHLORIDE BLD-SCNC: 104 MMOL/L (ref 99–110)
CO2: 25 MMOL/L (ref 21–32)
CREAT SERPL-MCNC: 0.6 MG/DL (ref 0.8–1.3)
EOSINOPHILS ABSOLUTE: 0.3 K/UL (ref 0–0.6)
EOSINOPHILS RELATIVE PERCENT: 3.1 %
GFR AFRICAN AMERICAN: >60
GFR NON-AFRICAN AMERICAN: >60
GLUCOSE BLD-MCNC: 107 MG/DL (ref 70–99)
HCT VFR BLD CALC: 30.4 % (ref 40.5–52.5)
HEMOGLOBIN: 10.3 G/DL (ref 13.5–17.5)
LYMPHOCYTES ABSOLUTE: 0.9 K/UL (ref 1–5.1)
LYMPHOCYTES RELATIVE PERCENT: 10.1 %
MCH RBC QN AUTO: 28.9 PG (ref 26–34)
MCHC RBC AUTO-ENTMCNC: 33.9 G/DL (ref 31–36)
MCV RBC AUTO: 85.2 FL (ref 80–100)
MONOCYTES ABSOLUTE: 0.6 K/UL (ref 0–1.3)
MONOCYTES RELATIVE PERCENT: 6.6 %
NEUTROPHILS ABSOLUTE: 7 K/UL (ref 1.7–7.7)
NEUTROPHILS RELATIVE PERCENT: 79.2 %
PDW BLD-RTO: 14.5 % (ref 12.4–15.4)
PLATELET # BLD: 339 K/UL (ref 135–450)
PMV BLD AUTO: 8.4 FL (ref 5–10.5)
POTASSIUM SERPL-SCNC: 4 MMOL/L (ref 3.5–5.1)
RBC # BLD: 3.57 M/UL (ref 4.2–5.9)
SODIUM BLD-SCNC: 137 MMOL/L (ref 136–145)
URINE CULTURE, ROUTINE: NORMAL
WBC # BLD: 8.8 K/UL (ref 4–11)

## 2019-04-06 PROCEDURE — APPSS15 APP SPLIT SHARED TIME 0-15 MINUTES: Performed by: NURSE PRACTITIONER

## 2019-04-06 PROCEDURE — APPNB30 APP NON BILLABLE TIME 0-30 MINS: Performed by: NURSE PRACTITIONER

## 2019-04-06 PROCEDURE — 6360000002 HC RX W HCPCS: Performed by: INTERNAL MEDICINE

## 2019-04-06 PROCEDURE — 6370000000 HC RX 637 (ALT 250 FOR IP): Performed by: INTERNAL MEDICINE

## 2019-04-06 PROCEDURE — 2060000000 HC ICU INTERMEDIATE R&B

## 2019-04-06 PROCEDURE — 6370000000 HC RX 637 (ALT 250 FOR IP): Performed by: NURSE PRACTITIONER

## 2019-04-06 PROCEDURE — 99232 SBSQ HOSP IP/OBS MODERATE 35: CPT | Performed by: SURGERY

## 2019-04-06 PROCEDURE — 2580000003 HC RX 258: Performed by: NURSE PRACTITIONER

## 2019-04-06 PROCEDURE — 6370000000 HC RX 637 (ALT 250 FOR IP): Performed by: HOSPITALIST

## 2019-04-06 PROCEDURE — 85025 COMPLETE CBC W/AUTO DIFF WBC: CPT

## 2019-04-06 PROCEDURE — 2580000003 HC RX 258

## 2019-04-06 PROCEDURE — 80048 BASIC METABOLIC PNL TOTAL CA: CPT

## 2019-04-06 PROCEDURE — 2580000003 HC RX 258: Performed by: INTERNAL MEDICINE

## 2019-04-06 RX ORDER — GUAIFENESIN/DEXTROMETHORPHAN 100-10MG/5
5 SYRUP ORAL EVERY 4 HOURS PRN
Qty: 120 ML | Refills: 0 | Status: SHIPPED | OUTPATIENT
Start: 2019-04-06 | End: 2019-04-16

## 2019-04-06 RX ORDER — BENZONATATE 100 MG/1
100 CAPSULE ORAL 3 TIMES DAILY PRN
Qty: 10 CAPSULE | Refills: 0 | Status: SHIPPED | OUTPATIENT
Start: 2019-04-06 | End: 2019-04-13

## 2019-04-06 RX ORDER — SODIUM CHLORIDE 9 MG/ML
INJECTION, SOLUTION INTRAVENOUS
Status: COMPLETED
Start: 2019-04-06 | End: 2019-04-06

## 2019-04-06 RX ORDER — FLUTICASONE PROPIONATE 50 MCG
1 SPRAY, SUSPENSION (ML) NASAL DAILY
Qty: 1 BOTTLE | Refills: 3 | Status: SHIPPED | OUTPATIENT
Start: 2019-04-07 | End: 2021-02-03 | Stop reason: ALTCHOICE

## 2019-04-06 RX ADMIN — CIPROFLOXACIN HYDROCHLORIDE 500 MG: 500 TABLET, FILM COATED ORAL at 20:16

## 2019-04-06 RX ADMIN — GUAIFENESIN AND DEXTROMETHORPHAN 5 ML: 100; 10 SYRUP ORAL at 03:16

## 2019-04-06 RX ADMIN — Medication 3 G: at 20:16

## 2019-04-06 RX ADMIN — PANTOPRAZOLE SODIUM 40 MG: 40 TABLET, DELAYED RELEASE ORAL at 06:57

## 2019-04-06 RX ADMIN — Medication 1 CAPSULE: at 17:32

## 2019-04-06 RX ADMIN — Medication 1 CAPSULE: at 09:37

## 2019-04-06 RX ADMIN — CIPROFLOXACIN HYDROCHLORIDE 500 MG: 500 TABLET, FILM COATED ORAL at 09:37

## 2019-04-06 RX ADMIN — Medication 3 G: at 14:16

## 2019-04-06 RX ADMIN — Medication 10 ML: at 06:58

## 2019-04-06 RX ADMIN — Medication 10 ML: at 20:17

## 2019-04-06 RX ADMIN — SODIUM CHLORIDE 250 ML: 9 INJECTION, SOLUTION INTRAVENOUS at 20:17

## 2019-04-06 RX ADMIN — Medication 10 ML: at 09:37

## 2019-04-06 RX ADMIN — ASPIRIN 81 MG 81 MG: 81 TABLET ORAL at 09:37

## 2019-04-06 RX ADMIN — Medication 3 G: at 06:57

## 2019-04-06 RX ADMIN — ATORVASTATIN CALCIUM 10 MG: 10 TABLET, FILM COATED ORAL at 20:16

## 2019-04-06 RX ADMIN — GUAIFENESIN AND DEXTROMETHORPHAN 5 ML: 100; 10 SYRUP ORAL at 16:12

## 2019-04-06 RX ADMIN — CETIRIZINE HYDROCHLORIDE 10 MG: 10 TABLET, FILM COATED ORAL at 09:37

## 2019-04-06 RX ADMIN — Medication 10 ML: at 09:40

## 2019-04-06 ASSESSMENT — ENCOUNTER SYMPTOMS: ABDOMINAL PAIN: 1

## 2019-04-06 ASSESSMENT — PAIN SCALES - GENERAL
PAINLEVEL_OUTOF10: 0

## 2019-04-06 NOTE — DISCHARGE SUMMARY
Hospital Medicine Discharge Summary    Patient ID: Gege Ramos      Patient's PCP: Yousuf Montero DO    Admit Date: 3/30/2019     Discharge Date:  4/6/19    Admitting Physician: Felicia Ramirez MD     Discharge Physician: Roya Colmenares MD     Discharge Diagnoses: Left Psoas abscess       Active Hospital Problems    Diagnosis Date Noted    Weight loss counseling, encounter for [Q34.8]     Complicated UTI (urinary tract infection) [N39.0]     Receiving intravenous antibiotic treatment as outpatient [Z79.2]     Sepsis due to methicillin susceptible Staphylococcus aureus ([address] [A41.01]     History of prostate cancer [Z85.46]     Status post prostatectomy [Z90.79]     Coronary artery disease due to lipid rich plaque [I25.10, I25.83]     Overweight [E66.3]     Leukocytosis [D72.829] 03/31/2019    Fever [R50.9] 03/31/2019    Psoas abscess [facility]) [K68.12] 03/30/2019       The patient was seen and examined on day of discharge and this discharge summary is in conjunction with any daily progress note from day of discharge. Hospital Course:        Psoas abscess s/p for IR guided prec drain placement.  Culture remarkable for MSSA  - PICC placed  - IV cefazolin per ID recs. Also On Ciprofloxacin  - drain management per GS. As Per surgery will go home with drain and Repeat CT scan in one week to note the resolution of abscess  - pain control   Appreciate ID evaluation. Surgeon may Consider Remova Of the drain after repeat scan does not show residual abscess     Klebsiella UTI  - po cipro per ID recs     Sepsis 2/2 above.  POA.  Now resolved     Hx of prostate cancer s/p prostatectomy  - per urology     Chronic cough.  Ongoing for years. Richad Sermons allergic   - no gross signs of infection  - on empiric PPI  - follows as outpateint     LE edema  - d/c IVFs  - CXR     Hyponatremia; now resolved     CAD s/p CABG     DVT Prophylaxis: lovenox  Diet: DIET GENERAL;  Code Status: Full Code          Physical Exam Performed:     BP (!) 152/78   Pulse 60   Temp 98.8 °F (37.1 °C) (Temporal)   Resp 16   Ht 5' 8\" (1.727 m)   Wt 196 lb 11.2 oz (89.2 kg)   SpO2 (!) 5%   BMI 29.91 kg/m²       General appearance:  No apparent distress, appears stated age and cooperative. HEENT:  Normal cephalic, atraumatic without obvious deformity. Pupils equal, round, and reactive to light. Extra ocular muscles intact. Conjunctivae/corneas clear. Neck: Supple, with full range of motion. No jugular venous distention. Trachea midline. Respiratory:  Normal respiratory effort. Clear to auscultation, bilaterally without Rales/Wheezes/Rhonchi. Cardiovascular:  Regular rate and rhythm with normal S1/S2 without murmurs, rubs or gallops. Abdomen: Soft, non-tender, non-distended with normal bowel sounds. Musculoskeletal:  No clubbing, cyanosis or edema bilaterally. Full range of motion without deformity. Skin: Skin color, texture, turgor normal.  No rashes or lesions. Neurologic:  Neurovascularly intact without any focal sensory/motor deficits. Cranial nerves: II-XII intact, grossly non-focal.  Psychiatric:  Alert and oriented, thought content appropriate, normal insight  Capillary Refill: Brisk,< 3 seconds   Peripheral Pulses: +2 palpable, equal bilaterally   Left sided  TREVIN drain in place    Labs: For convenience and continuity at follow-up the following most recent labs are provided:      CBC:    Lab Results   Component Value Date    WBC 8.8 04/06/2019    HGB 10.3 04/06/2019    HCT 30.4 04/06/2019     04/06/2019       Renal:    Lab Results   Component Value Date     04/06/2019    K 4.0 04/06/2019    K 3.7 03/30/2019     04/06/2019    CO2 25 04/06/2019    BUN 8 04/06/2019    CREATININE 0.6 04/06/2019    CALCIUM 7.9 04/06/2019    PHOS 2.7 04/01/2019         Significant Diagnostic Studies    Radiology:   XR CHEST PORTABLE   Final Result   Pulmonary vascular congestion.          CT ABSCESS DRAINAGE W CATH PLACEMENT S&I Final Result   Technically successful CT-guided placement of a 12 French locking pigtail   drainage catheter into the deep left iliopsoas fluid collection. CT ABDOMEN PELVIS W IV CONTRAST Additional Contrast? None   Final Result   1. Large lobular fluid collection along the left pelvic sidewall involving   the distal psoas and iliacus muscles measuring approximately 16.2 x 8.1 x 4.1   cm with mild adjacent stranding. This may represent an abscess or liquefying   hematoma. 2. Status post prostatectomy.                 Consults:     IP CONSULT TO HOSPITALIST  IP CONSULT TO GENERAL SURGERY  IP CONSULT TO INFECTIOUS DISEASES  IP CONSULT TO UROLOGY  IP CONSULT TO INFECTIOUS DISEASES  IP CONSULT TO SOCIAL WORK  IP CONSULT TO HOME CARE NEEDS    Disposition:  Home with Home care    Condition at Discharge: Stable    Discharge Instructions/Follow-up:  Surgery and ID    Code Status:  Full Code     Activity: activity as tolerated    Diet: cardiac diet      Discharge Medications:     Current Discharge Medication List           Details   benzonatate (TESSALON) 100 MG capsule Take 1 capsule by mouth 3 times daily as needed for Cough  Qty: 10 capsule, Refills: 0      guaiFENesin-dextromethorphan (ROBITUSSIN DM) 100-10 MG/5ML syrup Take 5 mLs by mouth every 4 hours as needed for Cough  Qty: 120 mL, Refills: 0      fluticasone (FLONASE) 50 MCG/ACT nasal spray 1 spray by Each Nare route daily  Qty: 1 Bottle, Refills: 3      ciprofloxacin (CIPRO) 500 MG tablet Take 1 tablet by mouth every 12 hours for 10 days  Qty: 20 tablet, Refills: 0      lactobacillus (CULTURELLE) capsule Take 1 capsule by mouth 2 times daily (with meals) for 15 days  Qty: 30 capsule, Refills: 0              Details   tiZANidine (ZANAFLEX) 4 MG tablet Take 1 tablet by mouth 3 times daily for 10 days  Qty: 30 tablet, Refills: 1    Associated Diagnoses: Left hip pain; Acute pain of left thigh      atorvastatin (LIPITOR) 10 MG tablet Take 1 tablet by mouth nightly  Qty: 90 tablet, Refills: 3    Associated Diagnoses: Coronary artery disease involving native coronary artery of native heart with unstable angina pectoris (HCC)      aspirin 81 MG tablet Take 1 tablet by mouth daily  Qty: 90 tablet, Refills: 3      senna-docusate (PERICOLACE) 8.6-50 MG per tablet Take 1 tablet by mouth 2 times daily  Qty: 50 tablet, Refills: 0             Time Spent on discharge is more than 28  in the examination, evaluation, counseling and review of medications and discharge plan. Signed:    Nba Morataya MD   4/6/2019      Thank you Polly Lebron DO for the opportunity to be involved in this patient's care. If you have any questions or concerns please feel free to contact me at [phone].

## 2019-04-06 NOTE — PROGRESS NOTES
[address] [place] [address] PROGRESS NOTE    4/6/2019 7:02 AM        Name: Priyanka Vogt . Admitted: 3/30/2019  Primary Care Provider: Tacho Ko DO (Tel: [phone])          CC: Complaints of pain in the left Hip while walking. Other wise No Other complaints. Tolerating antibiotics well. PICC placed.          Reviewed interval ancillary notes    Current Medications    ciprofloxacin (CIPRO) tablet 500 mg 2 times per day   lactobacillus (CULTURELLE) capsule 1 capsule BID WC   diphenhydrAMINE (BENADRYL) injection 5 mg Q6H PRN   melatonin tablet 6 mg Nightly PRN   cetirizine (ZYRTEC) tablet 10 mg Daily   fluticasone (FLONASE) 50 MCG/ACT nasal spray 1 spray Daily   ceFAZolin (ANCEF) 3 g in dextrose 5 % 100 mL IVPB Q8H   lidocaine PF 1 % injection 5 mL Once   sodium chloride flush 0.9 % injection 10 mL 2 times per day   sodium chloride flush 0.9 % injection 10 mL PRN   guaiFENesin-dextromethorphan (ROBITUSSIN DM) 100-10 MG/5ML syrup 5 mL Q4H PRN   pantoprazole (PROTONIX) tablet 40 mg QAM AC   benzonatate (TESSALON) capsule 100 mg TID PRN   aspirin chewable tablet 81 mg Daily   atorvastatin (LIPITOR) tablet 10 mg Nightly   sennosides-docusate sodium (SENOKOT-S) 8.6-50 MG tablet 1 tablet BID   morphine (PF) injection 2 mg Q4H PRN   sodium chloride flush 0.9 % injection 10 mL 2 times per day   sodium chloride flush 0.9 % injection 10 mL PRN   ondansetron (ZOFRAN) injection 4 mg Q6H PRN   enoxaparin (LOVENOX) injection 40 mg Daily   potassium chloride (KLOR-CON M) extended release tablet 40 mEq PRN   Or    potassium bicarb-citric acid (EFFER-K) effervescent tablet 40 mEq PRN   Or    potassium chloride 10 mEq/100 mL IVPB (Peripheral Line) PRN   magnesium sulfate 1 g in dextrose 5% 100 mL IVPB PRN   acetaminophen (TYLENOL) tablet 650 mg Q4H PRN   oxyCODONE-acetaminophen (PERCOCET) 5-325 MG per tablet 1 tablet Q4H PRN     lidocaine 1 % (PF) injection 0.1-0.5 mL Once       Objective:  BP (!) 157/75   Pulse 63   Temp 98.8 °F (37.1 °C) (Temporal)   Resp 16   Ht 5' 8\" (1.727 m)   Wt 198 lb 1.6 oz (89.9 kg)   SpO2 93%   BMI 30.12 kg/m²     Intake/Output Summary (Last 24 hours) at 4/6/2019 0702  Last data filed at 4/6/2019 7128  Gross per 24 hour   Intake --   Output 250 ml   Net -250 ml    Wt Readings from Last 3 Encounters:   04/05/19 198 lb 1.6 oz (89.9 kg)   03/28/19 180 lb (81.6 kg)   01/28/19 184 lb 8 oz (83.7 kg)       General appearance:  Appears comfortable  Eyes: Sclera clear. Pupils equal.  ENT: Moist oral mucosa. Trachea midline, no adenopathy. Cardiovascular: Regular rhythm, normal S1, S2. No murmur. No edema in lower extremities  Respiratory: Not using accessory muscles. Good inspiratory effort. Clear to auscultation bilaterally, no wheeze or crackles. GI: Abdomen soft, no tenderness, not distended, normal bowel sounds  Musculoskeletal: No cyanosis in digits, neck supple  Neurology: CN 2-12 grossly intact. No speech or motor deficits  Psych: Normal affect. Alert and oriented in time, place and person  Skin: Warm, dry, normal turgor  Extremity exam shows brisk capillary refill. Peripheral pulses are palpable in lower extremities   picc line in the Right arm. Movement of the left hip is painful. Labs and Tests:  CBC:   Recent Labs     04/04/19  0815 04/05/19 0522   WBC 15.0* 11.6*   HGB 10.4* 10.4*    306     BMP:  Recent Labs     04/04/19  0815 04/05/19 0522    138   K 4.1 4.2    104   CO2 23 24   BUN 9 8   CREATININE 0.7* 0.7*   GLUCOSE 118* 98     Hepatic: No results for input(s): AST, ALT, ALB, BILITOT, ALKPHOS in the last 72 hours. XR CHEST PORTABLE   Final Result   Pulmonary vascular congestion. CT ABSCESS DRAINAGE W CATH PLACEMENT S&I   Final Result   Technically successful CT-guided placement of a 12 [place] locking pigtail   drainage catheter into the deep left iliopsoas fluid collection.

## 2019-04-06 NOTE — PROGRESS NOTES
Conferred with Dr Luci Chapman (Gen Surg) RE discharge plan; awaiting [facility] to return spouse call RE 1st [facility] visit - anticipate admin of 2145 Ancef dose here, then discharge home for AM dose admin by [facility] (to instruct pt spouse on use of IV admin set/eqpmt).

## 2019-04-06 NOTE — PROGRESS NOTES
Discharge instructions reviewed with pt and spouse, and understood; awaiting med/supply delivery, and confirmation of Víctor Cobb RN visit time, as well as PM dose of Ancef for discharge.

## 2019-04-06 NOTE — CARE COORDINATION
Discharge planning-    Signed COCs sent to [facility] via fax.     Plan- Home with [address].     Will continue to follow for support and discharge planning.     -Lily Mckeon, MSW, LSW

## 2019-04-06 NOTE — PROGRESS NOTES
[facility]        Progress Note    Patient Name: Rakan Arango  MRN: [id number]  YOB: 1950  Date of Evaluation: 2019    Chief Complaint: Abdominal/groin pain    Subjective:  No acute events overnight  Pain well controlled  Tolerating diet without complaints of nausea/vomiting  No reported issues with drain      Vital Signs:  Patient Vitals for the past 24 hrs:   BP Temp Temp src Pulse Resp SpO2   19 0651 (!) 157/75 98.8 °F (37.1 °C) Temporal 63 16 93 %   19 0300 (!) 143/76 98.6 °F (37 °C) Temporal 77 16 97 %   19 2145 138/72 100 °F (37.8 °C) Temporal 72 16 97 %   19 1640 (!) 154/61 99.7 °F (37.6 °C) Oral 67 16 97 %   19 1157 (!) 152/81 99.4 °F (37.4 °C) Temporal 63 16 97 %      TEMPERATURE HISTORY 24H: Temp (24hrs), Av.3 °F (37.4 °C), Min:98.6 °F (37 °C), Max:100 °F (37.8 °C)    BLOOD PRESSURE HISTORY: Systolic (41ERF), IQL:098 , Min:135 , JRX:846    Diastolic (80GIP), GTE:90, Min:61, Max:81      Intake/Output:  I/O last 3 completed shifts:  In: -   Out: 250 [Urine:20; Drains:230]  No intake/output data recorded.   Drain/tube Output:  Closed/Suction Drain Left LLQ Bulb 12 Malay-Output (ml): 25 ml    Physical Exam:  General: awake, alert, oriented to  person, place, time  Lungs: clear to auscultation  Heart: RRR  Abdomen: soft, mildly tenderness at drain site only, nondistended, no masses or organomegaly   Drain: cloudy yellow output  SKIN:  no bruising or bleeding and normal skin color, texture, turgor      Labs:  CBC:    Recent Labs     19  0815 19  0522 19  0717   WBC 15.0* 11.6* 8.8   HGB 10.4* 10.4* 10.3*   HCT 30.8* 31.8* 30.4*    306 339     BMP:    Recent Labs     19  0815 19  0522    138   K 4.1 4.2    104   CO2 23 24   BUN 9 8   CREATININE 0.7* 0.7*   GLUCOSE 118* 98     Hepatic:   No results for input(s): AST, ALT, ALB, BILITOT, ALKPHOS in the last 72 hours. Amylase: No results for input(s): AMYLASE in the last 72 hours. Lipase: No results for input(s): LIPASE in the last 72 hours. Mag:      No results for input(s): MG in the last 72 hours. Phos:     No results for input(s): PHOS in the last 72 hours. Coags:   No results for input(s): INR, APTT in the last 72 hours. Cultures:  Anaerobic culture  No results for input(s): LABANAE in the last 72 hours. Blood culture  No results for input(s): BC in the last 72 hours. Blood culture 2  No results for input(s): BLOODCULT2 in the last 72 hours. Body fluid culture  No results for input(s): BLOODCULT2 in the last 72 hours. Surgical culture  No results for input(s): CXSURG in the last 72 hours. Fecal occult  No results for input(s): OCCULTBLDFEC in the last 72 hours. Gram stain  No results for input(s): LABGRAM in the last 72 hours. Stool culture 1  No results for input(s): CXST in the last 72 hours. Stool culture 2  No results for input(s): STOOLCULT2 in the last 72 hours. Urine culture  No results for input(s): LABURIN in the last 72 hours. Wound abscess  No results for input(s): WNDABS in the last 72 hours. Pathology:  NA    Imaging:  I have personally reviewed the following films:    Xr Hip Left (2-3 Views)    Result Date: 3/28/2019  EXAMINATION: 2 XRAY VIEWS OF THE LEFT HIP 3/28/2019 12:16 pm COMPARISON: None. HISTORY: ORDERING SYSTEM PROVIDED HISTORY: Left hip pain TECHNOLOGIST PROVIDED HISTORY: Reason for exam:->pain Ordering Physician Provided Reason for Exam: No known injury. Pain in crease of left leg. started 2 days ago. FINDINGS: The left hip is intact. No fracture or dislocation. There is mild degenerative change in both hip joints with sclerotic change to the acetabulum and mild osteophyte formation. Pelvic bones intact. Vascular phleboliths are present throughout the pelvis. No other significant soft tissue findings. No acute finding in the left hip.      Ct Abscess Drainage W Cath Placement S&i    Result Date: 4/1/2019  PROCEDURE: CT GUIDED ABSCESS FLUID DRAIN MODERATE CONSCIOUS SEDATION 3/31/2019 HISTORY: ORDERING SYSTEM PROVIDED HISTORY: abdominal abscess TECHNOLOGIST PROVIDED HISTORY: Reason for exam:->abdominal abscess Ordering Physician Provided Reason for Exam: abdominal abscess drainage Acuity: Acute Type of Exam: Initial TECHNIQUE: Dose modulation, iterative reconstruction, and/or weight based adjustment of the mA/kV was utilized to reduce the radiation dose to as low as reasonably achievable. CONTRAST: None. SEDATION: 2 mgversed and 100 mcg fentanyl were titrated intravenously for moderate sedation monitored under my direction. Total intraservice time of sedation was 15 minutes. The patient's vital signs were monitored throughout the procedure and recorded in the patient's medical record by the nurse. FLUOROSCOPY DOSE AND TYPE OR TIME AND EXPOSURES: 123 CT images, 360.7 mGy DESCRIPTION OF PROCEDURE: Informed consent was obtained after a detailed explanation of the procedure including risks, benefits, and alternatives. Universal protocol was observed. Limited CT imaging was performed through the lower abdomen and pelvis. An appropriate skin entry site was selected. The left groin is prepped and draped in sterile fashion. 1% lidocaine local anesthesia was used. Using CT guidance, a 5 Occitan needle sheath was advanced into the inferior iliopsoas/left pelvic sidewall collection. Wire was advanced into the collection. The tract was dilated and a 12 Occitan locking pigtail drainage catheter was placed into the collection. The catheter was placed to bulb suction. The catheter was secured to the skin using suture. The site was dressed and bandaged in sterile fashion. The patient tolerated procedure well without immediate complication. FINDINGS: 90 mL of cloudy tan fluid was aspirated and sent for culture per request ordering clinician.      Technically There is a large lobular fluid collection along the left pelvic sidewall partially involving the distal psoas and iliacus muscles measuring approximately 16.2 x 8.1 x 4.1 cm on sagittal image 106 and axial image 161. Adjacent fat stranding is noted. No pelvic or inguinal lymphadenopathy. No free intraperitoneal fluid in the pelvis. Peritoneum/Retroperitoneum: The abdominal aorta is normal in caliber with mild atherosclerotic vascular disease. No retroperitoneal or mesenteric lymphadenopathy is identified. No free air or fluid is seen in the abdomen. Bones/Soft Tissues: Status post bilateral vasectomy. Postoperative scarring in the anterior abdominal wall. Mild subcutaneous fat stranding in the left anterolateral abdominal wall. Status post median sternotomy. No acute osseous abnormality. 1. Large lobular fluid collection along the left pelvic sidewall involving the distal psoas and iliacus muscles measuring approximately 16.2 x 8.1 x 4.1 cm with mild adjacent stranding. This may represent an abscess or liquefying hematoma. 2. Status post prostatectomy. Us Dup Lower Extremity Left Kiran    Result Date: 3/29/2019  EXAMINATION: DUPLEX VENOUS ULTRASOUND OF THE LEFT LOWER EXTREMITY 3/29/2019 2:46 pm TECHNIQUE: Duplex ultrasound and Doppler images were obtained of the left lower extremity. COMPARISON: None. HISTORY: ORDERING SYSTEM PROVIDED HISTORY: Left hip pain TECHNOLOGIST PROVIDED HISTORY: Ordering Physician Provided Reason for Exam: left hip, groin pain, recent prostate surgery 2/2019 FINDINGS: The visualized veins of the left lower extremity are patent and free of echogenic thrombus. The veins are normally compressible and have normal phasic flow. No evidence of DVT in the left lower extremity.        Scheduled Meds:   ciprofloxacin  500 mg Oral 2 times per day    lactobacillus  1 capsule Oral BID WC    cetirizine  10 mg Oral Daily    fluticasone  1 spray Each Nare Daily    ceFAZolin  3 g

## 2019-04-06 NOTE — CARE COORDINATION
Discharge planning-    Per 3[facility] staff, anticipate discharge today. Per notes from 4/1, patient will go home with IV antibiotics through [address] [facility]. Called venitamed, notified the answering service. Will send home care order + MALINDA when available. Also called [facility], left voicemail message with intake. Notified of potential discharge today. Hospitalist notified via [facility], requested order for home care + signature of the [address]. Addendum: Spoke with Gerard/ [facility]. They can deliver the drugs tonight or early tomorrow am. They ask that the patient stay in-house tonight for the 10pm dose, as [facility] will not be able to provide a nurse until tomorrow am. Nurse/ Brandon notified, patient is in agreement. Awaiting home care order; completion of the [address]. Plan- Home with [address].     Will continue to follow for support and discharge planning.    -Bogdan Cardozo, MSW, LSW

## 2019-04-06 NOTE — PROGRESS NOTES
Breath sounds slightly diminished left base, but minimally clear with DB effort; dry cough persists - offered Guaifenesin-DM; no significant changes since earlier assessment, except as noted; BP remains mildly elevated; no other s/s of acute distress at present.

## 2019-04-06 NOTE — PLAN OF CARE
Problem: Pain:  Goal: Pain level will decrease  Description  Pain level will decrease  Outcome: Ongoing  Note:   Denies pain, dyspnea, nausea, lightheadedness at present; affirms awareness to report onset of same to staff     Problem: Falls - Risk of:  Goal: Will remain free from falls  Description  Will remain free from falls  Outcome: Ongoing  Note:   Call light in reach; bed in low position; SR up x2; pt affirms awareness and understanding of need to call for and await assist with OOB mobility if lightheaded, fatigued, or having diffficulty with balance.      Problem: Cardiovascular  Goal: Hemodynamic stability  Outcome: Ongoing  Note:   Remains in SR; BP mildly elevated - AM meds yet to be administered     Problem: Respiratory  Goal: No pulmonary complications  Outcome: Ongoing  Note:   Persistent dry cough; no crackles or wheezes evident - breath sounds clear bilat with DB effort; CXR yesterday revealed pulm vasc congestion  Goal: O2 Sat > 90%  Outcome: Ongoing  Note:   O2 sat WNL on RA     Problem: Musculor/Skeletal Functional Status  Goal: Highest potential functional level  Outcome: Ongoing  Note:   Pt affirms functional mobility is back to baseline except as R/T hip discomfort eminating from psoas abscess     Problem: GI  Goal: No bowel complications  Outcome: Ongoing  Note:   Passed med amt soft brown stool with no overt blood this AM (states overt blood evident yesterday)

## 2019-04-06 NOTE — CARE COORDINATION
Discharge planning-    Discharge order acknowledged. Home care order; signature of MALINDA from Hospitalist requested- via [facility]. Addendum: Sent home care order and MALINDA (not signed by the Hospitalist, despite multiple requests) to [facility].     Plan- Home with [address].     Will continue to follow for support and discharge planning.     -Alissa Hester, MSW, LSW

## 2019-04-07 VITALS
RESPIRATION RATE: 19 BRPM | BODY MASS INDEX: 29.64 KG/M2 | TEMPERATURE: 98.3 F | HEIGHT: 68 IN | OXYGEN SATURATION: 97 % | WEIGHT: 195.6 LBS | SYSTOLIC BLOOD PRESSURE: 156 MMHG | HEART RATE: 69 BPM | DIASTOLIC BLOOD PRESSURE: 78 MMHG

## 2019-04-07 PROCEDURE — 99231 SBSQ HOSP IP/OBS SF/LOW 25: CPT | Performed by: SURGERY

## 2019-04-07 PROCEDURE — APPNB30 APP NON BILLABLE TIME 0-30 MINS: Performed by: NURSE PRACTITIONER

## 2019-04-07 PROCEDURE — 6370000000 HC RX 637 (ALT 250 FOR IP): Performed by: HOSPITALIST

## 2019-04-07 PROCEDURE — 2580000003 HC RX 258: Performed by: INTERNAL MEDICINE

## 2019-04-07 PROCEDURE — 6370000000 HC RX 637 (ALT 250 FOR IP): Performed by: NURSE PRACTITIONER

## 2019-04-07 PROCEDURE — 6360000002 HC RX W HCPCS: Performed by: INTERNAL MEDICINE

## 2019-04-07 PROCEDURE — 6370000000 HC RX 637 (ALT 250 FOR IP): Performed by: INTERNAL MEDICINE

## 2019-04-07 PROCEDURE — APPSS15 APP SPLIT SHARED TIME 0-15 MINUTES: Performed by: NURSE PRACTITIONER

## 2019-04-07 RX ADMIN — BENZONATATE 100 MG: 100 CAPSULE ORAL at 08:26

## 2019-04-07 RX ADMIN — ASPIRIN 81 MG 81 MG: 81 TABLET ORAL at 08:18

## 2019-04-07 RX ADMIN — Medication 10 ML: at 08:18

## 2019-04-07 RX ADMIN — CIPROFLOXACIN HYDROCHLORIDE 500 MG: 500 TABLET, FILM COATED ORAL at 08:18

## 2019-04-07 RX ADMIN — Medication 3 G: at 05:35

## 2019-04-07 RX ADMIN — PANTOPRAZOLE SODIUM 40 MG: 40 TABLET, DELAYED RELEASE ORAL at 05:34

## 2019-04-07 RX ADMIN — ACETAMINOPHEN 650 MG: 325 TABLET, FILM COATED ORAL at 08:18

## 2019-04-07 RX ADMIN — Medication 1 CAPSULE: at 08:18

## 2019-04-07 ASSESSMENT — PAIN DESCRIPTION - DESCRIPTORS: DESCRIPTORS: HEADACHE

## 2019-04-07 ASSESSMENT — PAIN DESCRIPTION - PAIN TYPE: TYPE: ACUTE PAIN

## 2019-04-07 ASSESSMENT — PAIN DESCRIPTION - LOCATION: LOCATION: HEAD

## 2019-04-07 ASSESSMENT — PAIN SCALES - GENERAL: PAINLEVEL_OUTOF10: 4

## 2019-04-07 NOTE — PROGRESS NOTES
138 137   K 4.2 4.0    104   CO2 24 25   BUN 8 8   CREATININE 0.7* 0.6*   GLUCOSE 98 107*     Hepatic:   No results for input(s): AST, ALT, ALB, BILITOT, ALKPHOS in the last 72 hours. Amylase: No results for input(s): AMYLASE in the last 72 hours. Lipase: No results for input(s): LIPASE in the last 72 hours. Mag:      No results for input(s): MG in the last 72 hours. Phos:     No results for input(s): PHOS in the last 72 hours. Coags:   No results for input(s): INR, APTT in the last 72 hours. Cultures:  Anaerobic culture  No results for input(s): LABANAE in the last 72 hours. Blood culture  Recent Labs     04/05/19  1139   BC No Growth to date. Any change in status will be called. Blood culture 2  No results for input(s): BLOODCULT2 in the last 72 hours. Body fluid culture  No results for input(s): BLOODCULT2 in the last 72 hours. Surgical culture  No results for input(s): CXSURG in the last 72 hours. Fecal occult  No results for input(s): OCCULTBLDFEC in the last 72 hours. Gram stain  No results for input(s): LABGRAM in the last 72 hours. Stool culture 1  No results for input(s): CXST in the last 72 hours. Stool culture 2  No results for input(s): STOOLCULT2 in the last 72 hours. Urine culture  Recent Labs     04/05/19  1215   LABURIN No growth at 18-36 hours       Wound abscess  No results for input(s): WNDABS in the last 72 hours. Pathology:  NA    Imaging:  I have personally reviewed the following films:    Xr Hip Left (2-3 Views)    Result Date: 3/28/2019  EXAMINATION: 2 XRAY VIEWS OF THE LEFT HIP 3/28/2019 12:16 pm COMPARISON: None. HISTORY: ORDERING SYSTEM PROVIDED HISTORY: Left hip pain TECHNOLOGIST PROVIDED HISTORY: Reason for exam:->pain Ordering Physician Provided Reason for Exam: No known injury. Pain in crease of left leg. started 2 days ago. FINDINGS: The left hip is intact. No fracture or dislocation.   There is mild degenerative change in both hip joints with sclerotic change to the acetabulum and mild osteophyte formation. Pelvic bones intact. Vascular phleboliths are present throughout the pelvis. No other significant soft tissue findings. No acute finding in the left hip. Ct Abscess Drainage W Cath Placement S&i    Result Date: 4/1/2019  PROCEDURE: CT GUIDED ABSCESS FLUID DRAIN MODERATE CONSCIOUS SEDATION 3/31/2019 HISTORY: ORDERING SYSTEM PROVIDED HISTORY: abdominal abscess TECHNOLOGIST PROVIDED HISTORY: Reason for exam:->abdominal abscess Ordering Physician Provided Reason for Exam: abdominal abscess drainage Acuity: Acute Type of Exam: Initial TECHNIQUE: Dose modulation, iterative reconstruction, and/or weight based adjustment of the mA/kV was utilized to reduce the radiation dose to as low as reasonably achievable. CONTRAST: None. SEDATION: 2 mgversed and 100 mcg fentanyl were titrated intravenously for moderate sedation monitored under my direction. Total intraservice time of sedation was 15 minutes. The patient's vital signs were monitored throughout the procedure and recorded in the patient's medical record by the nurse. FLUOROSCOPY DOSE AND TYPE OR TIME AND EXPOSURES: 123 CT images, 360.7 mGy DESCRIPTION OF PROCEDURE: Informed consent was obtained after a detailed explanation of the procedure including risks, benefits, and alternatives. Universal protocol was observed. Limited CT imaging was performed through the lower abdomen and pelvis. An appropriate skin entry site was selected. The left groin is prepped and draped in sterile fashion. 1% lidocaine local anesthesia was used. Using CT guidance, a 5 Beninese needle sheath was advanced into the inferior iliopsoas/left pelvic sidewall collection. Wire was advanced into the collection. The tract was dilated and a 12 Beninese locking pigtail drainage catheter was placed into the collection. The catheter was placed to bulb suction. The catheter was secured to the skin using suture. The site was dressed and bandaged in sterile fashion. The patient tolerated procedure well without immediate complication. FINDINGS: 90 mL of cloudy tan fluid was aspirated and sent for culture per request ordering clinician. Technically successful CT-guided placement of a 12 Polish locking pigtail drainage catheter into the deep left iliopsoas fluid collection. Ct Abdomen Pelvis W Iv Contrast Additional Contrast? None    Result Date: 3/30/2019  EXAMINATION: CT OF THE ABDOMEN AND PELVIS WITH CONTRAST 3/30/2019 6:09 pm TECHNIQUE: CT of the abdomen and pelvis was performed with the administration of intravenous contrast. Multiplanar reformatted images are provided for review. Dose modulation, iterative reconstruction, and/or weight based adjustment of the mA/kV was utilized to reduce the radiation dose to as low as reasonably achievable. COMPARISON: None. HISTORY: ORDERING SYSTEM PROVIDED HISTORY: left hip pain. ..include bone window please TECHNOLOGIST PROVIDED HISTORY: Additional Contrast?->None Ordering Physician Provided Reason for Exam: Hip Pain (prostate removed 1/28th, patient reporting everything was great after surgery, then this week he thinks he must have caught the flu where hes had high fevers, and his left hip has been hurting so severely he hasnt been able to walk. ) Acuity: Acute Type of Exam: Initial FINDINGS: Lower Chest: Coronary artery atherosclerotic vascular calcifications. Mild bibasilar atelectasis. Trace left pleural effusion. Organs: 9 mm hypodensity in the left hepatic lobe on axial image 51 too small to definitively characterize but likely to represent a benign cyst or hemangioma. The gallbladder is normal in appearance. No biliary ductal dilatation. The pancreas, spleen, and bilateral adrenal glands are unremarkable. Small bilateral renal hypodensities measuring less than 1 cm too small to definitively characterize but likely to represent benign cysts.  No obstructive uropathy. GI/Bowel: Normal appendix. Sigmoid colon diverticulosis. No bowel obstruction or abnormal bowel wall thickening. Pelvis: The urinary bladder is unremarkable. The prostate gland is not clearly visualized possibly status post prostatectomy. There is a large lobular fluid collection along the left pelvic sidewall partially involving the distal psoas and iliacus muscles measuring approximately 16.2 x 8.1 x 4.1 cm on sagittal image 106 and axial image 161. Adjacent fat stranding is noted. No pelvic or inguinal lymphadenopathy. No free intraperitoneal fluid in the pelvis. Peritoneum/Retroperitoneum: The abdominal aorta is normal in caliber with mild atherosclerotic vascular disease. No retroperitoneal or mesenteric lymphadenopathy is identified. No free air or fluid is seen in the abdomen. Bones/Soft Tissues: Status post bilateral vasectomy. Postoperative scarring in the anterior abdominal wall. Mild subcutaneous fat stranding in the left anterolateral abdominal wall. Status post median sternotomy. No acute osseous abnormality. 1. Large lobular fluid collection along the left pelvic sidewall involving the distal psoas and iliacus muscles measuring approximately 16.2 x 8.1 x 4.1 cm with mild adjacent stranding. This may represent an abscess or liquefying hematoma. 2. Status post prostatectomy. Us Dup Lower Extremity Left Kiran    Result Date: 3/29/2019  EXAMINATION: DUPLEX VENOUS ULTRASOUND OF THE LEFT LOWER EXTREMITY 3/29/2019 2:46 pm TECHNIQUE: Duplex ultrasound and Doppler images were obtained of the left lower extremity. COMPARISON: None. HISTORY: ORDERING SYSTEM PROVIDED HISTORY: Left hip pain TECHNOLOGIST PROVIDED HISTORY: Ordering Physician Provided Reason for Exam: left hip, groin pain, recent prostate surgery 2/2019 FINDINGS: The visualized veins of the left lower extremity are patent and free of echogenic thrombus. The veins are normally compressible and have normal phasic flow. No evidence of DVT in the left lower extremity. Scheduled Meds:   ciprofloxacin  500 mg Oral 2 times per day    lactobacillus  1 capsule Oral BID WC    cetirizine  10 mg Oral Daily    fluticasone  1 spray Each Nare Daily    ceFAZolin  3 g Intravenous Q8H    lidocaine 1 % injection  5 mL Intradermal Once    sodium chloride flush  10 mL Intravenous 2 times per day    pantoprazole  40 mg Oral QAM AC    aspirin  81 mg Oral Daily    atorvastatin  10 mg Oral Nightly    sennosides-docusate sodium  1 tablet Oral BID    sodium chloride flush  10 mL Intravenous 2 times per day    enoxaparin  40 mg Subcutaneous Daily     Continuous Infusions:    PRN Meds:.diphenhydrAMINE, melatonin, sodium chloride flush, guaiFENesin-dextromethorphan, benzonatate, morphine, sodium chloride flush, ondansetron, potassium chloride **OR** potassium alternative oral replacement **OR** potassium chloride, magnesium sulfate, acetaminophen, oxyCODONE-acetaminophen      Assessment:  79 y.o. male admitted with   1. Psoas abscess (HCC)      Mr. Allison Anthony is a 79 y.o. male who presents with   Left psoas abscess, s/p percutaneous drainage 3/31/19  Recent robot prostatectomy  CAD, s/p CABG      Plan:  1. Continue drain care/monitoring; plans for repeat CT imaging 1 week after discharge just prior to follow up with Dr. Norman Dumont  2. Diet as tolerated  3. Antibiotics per ID, planning for home IV antibiotics upon discharge   4. Activity as tolerated, ambulate TID, up to chair for all meals  5. Pulmonary toilet, incentive spirometry  6. PRN analgesics and antiemetics--minimizing narcotics as tolerated  7. DVT prophylaxis with Lovenox  8. Management of medical comorbid etiologies per primary team and consulting services  9. Disposition: Okay for discharge home with TREVIN drain in place; follow up with Dr. Norman Dumont in 1 week    EDUCATION:  Educated patient on plan of care and disease process--all questions answered.     Plans discussed with patient and nursing. Reviewed and discussed with Dr. Annika Treviño.       Signed:  NIHARIKA Isidro - CNP  4/7/2019 12:56 PM     Pt seen and examined with NP See full note above  OK for DC plans today  Drain care reviewed, looks purulent still  Staying on IV atbx  See Dr. Florencia Spatz in office    Froylan Sos

## 2019-04-07 NOTE — PROGRESS NOTES
Instructed spouse on admin of syringe based Ancef with instruction for slow push over 10 mins - spouse states [employer] eq[facility]t may also include a mini-infuser, which [facility] will instruct on; dressing change performed to Rt groin TREVIN drain site - area pink, nontender; instructed pt and spouse on change from [facility] to [facility] has not returned spouse or [employer] calls as R/T start of care); discharge instructions re-reviewed with pt and spouse, and understood; discharged to home via w/c with spouse.

## 2019-04-07 NOTE — PROGRESS NOTES
Hospitalist Progress Note      PCP: Daysi Roblero DO    Date of Admission: 3/30/2019      Subjective: Complaints of Itching in the back. TREVIN drain still draining purulent Fluid. No back pain. Patient not discharged yesterday as home health could not be arranged yesterday      Medications:  Reviewed    Infusion Medications   Scheduled Medications    ciprofloxacin  500 mg Oral 2 times per day    lactobacillus  1 capsule Oral BID WC    cetirizine  10 mg Oral Daily    fluticasone  1 spray Each Nare Daily    ceFAZolin  3 g Intravenous Q8H    lidocaine 1 % injection  5 mL Intradermal Once    sodium chloride flush  10 mL Intravenous 2 times per day    pantoprazole  40 mg Oral QAM AC    aspirin  81 mg Oral Daily    atorvastatin  10 mg Oral Nightly    sennosides-docusate sodium  1 tablet Oral BID    sodium chloride flush  10 mL Intravenous 2 times per day    enoxaparin  40 mg Subcutaneous Daily     PRN Meds: diphenhydrAMINE, melatonin, sodium chloride flush, guaiFENesin-dextromethorphan, benzonatate, morphine, sodium chloride flush, ondansetron, potassium chloride **OR** potassium alternative oral replacement **OR** potassium chloride, magnesium sulfate, acetaminophen, oxyCODONE-acetaminophen      Intake/Output Summary (Last 24 hours) at 4/7/2019 0923  Last data filed at 4/7/2019 0811  Gross per 24 hour   Intake 540 ml   Output 2320 ml   Net -1780 ml       Physical Exam Performed:    BP (!) 156/78   Pulse 69   Temp 98.3 °F (36.8 °C) (Temporal)   Resp 19   Ht 5' 8\" (1.727 m)   Wt 195 lb 9.6 oz (88.7 kg)   SpO2 97%   BMI 29.74 kg/m²     General appearance: No apparent distress, appears stated age and cooperative. HEENT: Pupils equal, round, and reactive to light. Conjunctivae/corneas clear. Neck: Supple, with full range of motion. No jugular venous distention. Trachea midline. Respiratory:  Normal respiratory effort.  Clear to auscultation, bilaterally without Rales/Wheezes/Rhonchi. Cardiovascular: Regular rate and rhythm with normal S1/S2 without murmurs, rubs or gallops. Abdomen: Soft, non-tender, non-distended with normal bowel sounds. Musculoskeletal: No clubbing, cyanosis or edema bilaterally. Full range of motion without deformity. Skin: Skin color, texture, turgor normal.  No rashes or lesions. Neurologic:  Neurovascularly intact without any focal sensory/motor deficits. Cranial nerves: II-XII intact, grossly non-focal.  Psychiatric: Alert and oriented, thought content appropriate, normal insight  Capillary Refill: Brisk,< 3 seconds   Peripheral Pulses: +2 palpable, equal bilaterally   TREVIN drain in place    Labs:   Recent Labs     04/05/19 0522 04/06/19  0717   WBC 11.6* 8.8   HGB 10.4* 10.3*   HCT 31.8* 30.4*    339     Recent Labs     04/05/19 0522 04/06/19  0717    137   K 4.2 4.0    104   CO2 24 25   BUN 8 8   CREATININE 0.7* 0.6*   CALCIUM 7.7* 7.9*     No results for input(s): AST, ALT, BILIDIR, BILITOT, ALKPHOS in the last 72 hours. No results for input(s): INR in the last 72 hours. No results for input(s): Caroline Zi in the last 72 hours. Urinalysis:      Lab Results   Component Value Date    NITRU Negative 03/30/2019    WBCUA 3-5 03/30/2019    BACTERIA 1+ 03/30/2019    RBCUA 0-2 03/30/2019    BLOODU MODERATE 03/30/2019    SPECGRAV 1.026 03/30/2019    GLUCOSEU Negative 03/30/2019       Radiology:  XR CHEST PORTABLE   Final Result   Pulmonary vascular congestion. CT ABSCESS DRAINAGE W CATH PLACEMENT S&I   Final Result   Technically successful CT-guided placement of a 12 [place]an locking pigtail   drainage catheter into the deep left iliopsoas fluid collection. CT ABDOMEN PELVIS W IV CONTRAST Additional Contrast? None   Final Result   1.  Large lobular fluid collection along the left pelvic sidewall involving   the distal psoas and iliacus muscles measuring approximately 16.2 x 8.1 x 4.1   cm with mild adjacent stranding. This may represent an abscess or liquefying   hematoma. 2. Status post prostatectomy. Assessment/Plan:    Psoas abscess s/p for IR guided prec drain placement.  Culture remarkable for MSSA  - PICC placed  - IV cefazolin per ID recs. Also On Ciprofloxacin  - drain management per GS. As Per surgery will go home with drain and Repeat CT scan in one week to note the resolution of abscess  - pain control   Appreciate ID evaluation. Surgeon may Consider Remova Of the drain after repeat scan does not show residual abscess     Klebsiella UTI  - po cipro per ID recs     Sepsis 2/2 above. POA.  Now resolved     Hx of prostate cancer s/p prostatectomy  - per urology     Chronic cough.  Ongoing for years. [place] allergic   - no gross signs of infection  - on empiric PPI  - follows as outpateint     LE edema  - d/c IVFs  - CXR     Hyponatremia; now resolved     CAD s/p CABG    Hopefully discharge today Once home health is arranged and home antibiotics set Up.  His wife is a Nurse who would help with Home antibiotics     DVT Prophylaxis: lovenox  Diet: DIET GENERAL;  Code Status: Full Code              Active Hospital Problems    Diagnosis Date Noted    Weight loss counseling, encounter for [A22.1]     Complicated UTI (urinary tract infection) [N39.0]     Receiving intravenous antibiotic treatment as outpatient [Z79.2]     Sepsis due to methicillin susceptible Staphylococcus aureus ([facility] [address] [A41.01]     History of prostate cancer [Z85.46]     Status post prostatectomy [Z90.79]     Coronary artery disease due to lipid rich plaque [I25.10, I25.83]     Overweight [E66.3]     Leukocytosis [D72.829] 03/31/2019    Fever [R50.9] 03/31/2019    Psoas abscess ([facility] [address] [K68.12] 03/30/2019           Windy Holter, MD

## 2019-04-07 NOTE — CARE COORDINATION
Discharge planning-    Per [facility] staff, patient remains in-house because the home care agency [facility]) never confirmed that they knew of the discharge; never reached out to the patient/ family to schedule the am dose. [facility] has already delivered the drugs to the patient's home. [facility], left voicemail message with intake. No response. Called [facility], spoke with Veda Zuniga in intake. They can accept the home care referral, and can be at the home tonight at 8pm for the evening dose. Facesheet; Home care order; completed MALINDA sent via fax. Brandon/ [facility] notified. Plan- Home with Rl for IV antibiotics.     Will continue to follow for support and discharge planning.    -Lily Mckeon, MSW, LSW

## 2019-04-07 NOTE — PROGRESS NOTES
Morning labs not drawn d/t patient discharge order and completion of paperwork last evening. Awaiting phone call from homecare agency.

## 2019-04-08 ENCOUNTER — CARE COORDINATION (OUTPATIENT)
Dept: CASE MANAGEMENT | Age: 69
End: 2019-04-08

## 2019-04-08 DIAGNOSIS — K68.12 PSOAS ABSCESS (HCC): Primary | ICD-10-CM

## 2019-04-08 PROCEDURE — 1111F DSCHRG MED/CURRENT MED MERGE: CPT | Performed by: FAMILY MEDICINE

## 2019-04-08 NOTE — CARE COORDINATION
[facility] Transitions Initial Follow Up Call    Call within 2 business days of discharge: Yes    Patient: Rakan Arango Patient : 1950   MRN: [id number]  Reason for Admission:   Discharge Date: 19 RARS: Readmission Risk Score: 19      Last Discharge [facility]       Complaint Diagnosis Description Type Department Provider    3/30/19 Hip Pain Psoas abscess [facility]) ED to Hosp-Admission (Discharged) (ADMITTED) [facility] Pepe Solitario MD; Ashlyn Baker. .. Spoke with: Diana 193: [facility]    Non-face-to-face services provided:  Obtained and reviewed discharge summary and/or continuity of care documents  Assessment and support for treatment adherence and medication management-[facility]    Care Transitions 24 Hour Call    Do you have any ongoing symptoms?:  No  Do you have a copy of your discharge instructions?:  Yes  Do you have all of your prescriptions and are they filled?:  Yes  Have you been contacted by a [address]?:  No  Have you scheduled your follow up appointment?:  Yes  How are you going to get to your appointment?:  Car - family or friend to transport  Were you discharged with any Home Care or Post Acute Services:  Yes  Post Acute Services:  Home Health (Comment: [facility] and [facility])  Do you have support at home?:  Partner/Spouse/SO  Do you feel like you have everything you need to keep you well at home?:  Yes  Are you an active caregiver in your home?:  No  Care Transitions Interventions  No Identified Needs       Pt states doing well, no issues or concerns. Denies pain, fever. Continues to have TREVIN drain, area around drain looks good. PICC line site looks good. [facility]. nurse was out last night, His wife is a nurse and will be administering the antibiotic, he will be learning as well since it is 3x/day for 3 weeks. Has all new meds, reviewed all others. F/U appts listed below.  Agreed to more [facility] f/u calls      Follow Up  Future Appointments   Date Time Provider Department

## 2019-04-10 LAB
ALBUMIN SERPL-MCNC: 2.9 G/DL
ALP BLD-CCNC: 136 U/L
ALT SERPL-CCNC: 29 U/L
ANION GAP SERPL CALCULATED.3IONS-SCNC: NORMAL MMOL/L
AST SERPL-CCNC: 52 U/L
BASOPHILS ABSOLUTE: 0.21 /ΜL
BASOPHILS RELATIVE PERCENT: 3 %
BILIRUB SERPL-MCNC: 0.5 MG/DL (ref 0.1–1.4)
BLOOD CULTURE, ROUTINE: NORMAL
BUN BLDV-MCNC: 18 MG/DL
C-REACTIVE PROTEIN: 15.2
CALCIUM SERPL-MCNC: 8.7 MG/DL
CHLORIDE BLD-SCNC: 99 MMOL/L
CO2: 21 MMOL/L
CREAT SERPL-MCNC: 0.82 MG/DL
EOSINOPHILS ABSOLUTE: 0.16 /ΜL
EOSINOPHILS RELATIVE PERCENT: 2 %
GFR CALCULATED: NORMAL
GLUCOSE BLD-MCNC: 88 MG/DL
HCT VFR BLD CALC: 38.8 % (ref 41–53)
HEMOGLOBIN: 12.7 G/DL (ref 13.5–17.5)
LYMPHOCYTES ABSOLUTE: 1.51 /ΜL
LYMPHOCYTES RELATIVE PERCENT: 20 %
MCH RBC QN AUTO: 27.3 PG
MCHC RBC AUTO-ENTMCNC: 32.7 G/DL
MCV RBC AUTO: 83.7 FL
MONOCYTES ABSOLUTE: 0.71 /ΜL
MONOCYTES RELATIVE PERCENT: 9 %
NEUTROPHILS ABSOLUTE: 4.99 /ΜL
NEUTROPHILS RELATIVE PERCENT: ABNORMAL %
PLATELET # BLD: 727 K/ΜL
PMV BLD AUTO: 8 FL
POTASSIUM SERPL-SCNC: 5.2 MMOL/L
RBC # BLD: 4.64 10^6/ΜL
SEDIMENTATION RATE, ERYTHROCYTE: 84
SODIUM BLD-SCNC: 133 MMOL/L
TOTAL PROTEIN: 7.2
WBC # BLD: 7.6 10^3/ML

## 2019-04-12 ENCOUNTER — HOSPITAL ENCOUNTER (OUTPATIENT)
Dept: CT IMAGING | Age: 69
Discharge: HOME | End: 2019-04-12
Payer: COMMERCIAL

## 2019-04-12 ENCOUNTER — TELEPHONE (OUTPATIENT)
Dept: INFECTIOUS DISEASES | Age: 69
End: 2019-04-12

## 2019-04-12 DIAGNOSIS — K68.12 PSOAS ABSCESS (HCC): ICD-10-CM

## 2019-04-12 DIAGNOSIS — K68.12 PSOAS ABSCESS (HCC): Primary | ICD-10-CM

## 2019-04-12 PROCEDURE — 74177 CT ABD & PELVIS W/CONTRAST: CPT

## 2019-04-12 PROCEDURE — 6360000004 HC RX CONTRAST MEDICATION: Performed by: SURGERY

## 2019-04-12 RX ADMIN — IOHEXOL 50 ML: 240 INJECTION, SOLUTION INTRATHECAL; INTRAVASCULAR; INTRAVENOUS; ORAL at 12:03

## 2019-04-12 RX ADMIN — IOPAMIDOL 75 ML: 755 INJECTION, SOLUTION INTRAVENOUS at 12:02

## 2019-04-12 NOTE — TELEPHONE ENCOUNTER
Lab received from Jose Jackson. Platelets [phone]  ESR 84  Labs routed to Dr. Ashley Sandhu and Dr. Mitchell Ortiz for review.

## 2019-04-15 LAB
ALBUMIN SERPL-MCNC: 3.3 G/DL
ALP BLD-CCNC: 116 U/L
ALT SERPL-CCNC: 25 U/L
ANION GAP SERPL CALCULATED.3IONS-SCNC: NORMAL MMOL/L
AST SERPL-CCNC: 40 U/L
BASOPHILS ABSOLUTE: 0 /ΜL
BASOPHILS RELATIVE PERCENT: 0 %
BILIRUB SERPL-MCNC: 0.6 MG/DL (ref 0.1–1.4)
BUN BLDV-MCNC: 16 MG/DL
C-REACTIVE PROTEIN: 13.1
CALCIUM SERPL-MCNC: 9.1 MG/DL
CHLORIDE BLD-SCNC: 100 MMOL/L
CO2: 22 MMOL/L
CREAT SERPL-MCNC: 0.74 MG/DL
EOSINOPHILS ABSOLUTE: 0.4 /ΜL
EOSINOPHILS RELATIVE PERCENT: 5 %
GFR CALCULATED: NORMAL
GLUCOSE BLD-MCNC: 86 MG/DL
HCT VFR BLD CALC: 37.7 % (ref 41–53)
HEMOGLOBIN: 12.5 G/DL (ref 13.5–17.5)
LYMPHOCYTES ABSOLUTE: 2.2 /ΜL
LYMPHOCYTES RELATIVE PERCENT: 30 %
MCH RBC QN AUTO: 27.6 PG
MCHC RBC AUTO-ENTMCNC: 33.1 G/DL
MCV RBC AUTO: 83.3 FL
MONOCYTES ABSOLUTE: 0.8 /ΜL
MONOCYTES RELATIVE PERCENT: 11 %
NEUTROPHILS ABSOLUTE: 3.9 /ΜL
NEUTROPHILS RELATIVE PERCENT: 54 %
PLATELET # BLD: 575 K/ΜL
PMV BLD AUTO: 8.3 FL
POTASSIUM SERPL-SCNC: 4.7 MMOL/L
RBC # BLD: 4.53 10^6/ΜL
SEDIMENTATION RATE, ERYTHROCYTE: 63
SODIUM BLD-SCNC: 134 MMOL/L
TOTAL PROTEIN: 7.5
WBC # BLD: 7.3 10^3/ML

## 2019-04-16 ENCOUNTER — OFFICE VISIT (OUTPATIENT)
Dept: SURGERY | Age: 69
End: 2019-04-16
Payer: COMMERCIAL

## 2019-04-16 VITALS — DIASTOLIC BLOOD PRESSURE: 82 MMHG | SYSTOLIC BLOOD PRESSURE: 130 MMHG | WEIGHT: 170 LBS | BODY MASS INDEX: 25.85 KG/M2

## 2019-04-16 DIAGNOSIS — K68.12 PSOAS ABSCESS (HCC): Primary | ICD-10-CM

## 2019-04-16 PROCEDURE — 99213 OFFICE O/P EST LOW 20 MIN: CPT | Performed by: SURGERY

## 2019-04-16 NOTE — PROGRESS NOTES
[facility]  SUBJECTIVE:    Chief Complaint: Left psoas abscess    Nelly Davidson   1950   79 y.o. male presents for follow-up regarding recent admission for a left psoas abscess. Almost 2 months prior the patient had a robot assisted laparoscopic prostatectomy for malignancy. The patient had increasing left hip and thigh pain for a month prior to admission and then was found to have a large left psoas abscess. This was percutaneously drained on March 31, 2019 and had large output over 200 mL per day for the next several days. He's been started on IV antibiotics which have been continued after discharge through a right arm PICC. He's been tolerating these well. His pain is well controlled. He is tolerating a diet and with normal bowel movements. He is voiding well. The drain output has decreased to 20-40 mL per day. He has no other complaints and presents primarily for drain follow-up.   In the interim he's had repeat CT which shows a well-placed drain without evidence of loculation or other abscess    Past Medical History:   Diagnosis Date    CAD (coronary artery disease)     Mixed hyperlipidemia 11/26/2018    Prostate cancer ([address] 1/21/2019     Past Surgical History:   Procedure Laterality Date    CARDIAC CATHETERIZATION  12/28/2015    Dr Vasquez Counter    COLONOSCOPY  1-10-11    CORONARY ARTERY BYPASS GRAFT  12.30.2015    [facility]      PROSTATECTOMY N/A 1/28/2019    ROBOTIC LAPAROSCOPIC RADICAL PROSTATECTOMY WITH BILATERAL LYMPH NODE DISSECTION AND RIGHT SIDE NERVE SPARING performed by Tess Rose MD at [address] History     Socioeconomic History    Marital status:      Spouse name: Carlos Paez    Number of children: 2    Years of education: 15    Highest education level: Not on file   Occupational History    Not on file   Social Needs    Financial resource strain: Not on file    Food insecurity:     Worry: Not on file     Inability: Not on file    Transportation needs:     Medical: Not on file     Non-medical: Not on file   Tobacco Use    Smoking status: Never Smoker    Smokeless tobacco: Never Used   Substance and Sexual Activity    Alcohol use: Yes     Alcohol/week: 4.2 oz     Types: 7 Glasses of wine per week     Comment: per week    Drug use: No    Sexual activity: Not Currently   Lifestyle    Physical activity:     Days per week: Not on file     Minutes per session: Not on file    Stress: Not on file   Relationships    Social connections:     Talks on phone: Not on file     Gets together: Not on file     Attends [employer] service: Not on file     Active member of club or organization: Not on file     Attends meetings of clubs or organizations: Not on file     Relationship status: Not on file    Intimate partner violence:     Fear of current or ex partner: Not on file     Emotionally abused: Not on file     Physically abused: Not on file     Forced sexual activity: Not on file   Other Topics Concern    Not on file   Social History Narrative    Not on file      Family History   Problem Relation Age of Onset    Heart Disease Father     Stroke Mother     Other Sister      Current Outpatient Medications   Medication Sig Dispense Refill    guaiFENesin-dextromethorphan (ROBITUSSIN DM) 100-10 MG/5ML syrup Take 5 mLs by mouth every 4 hours as needed for Cough 120 mL 0    fluticasone (FLONASE) 50 MCG/ACT nasal spray 1 spray by Each Nare route daily 1 Bottle 3    lactobacillus (CULTURELLE) capsule Take 1 capsule by mouth 2 times daily (with meals) for 15 days 30 capsule 0    atorvastatin (LIPITOR) 10 MG tablet Take 1 tablet by mouth nightly 90 tablet 3    senna-docusate (PERICOLACE) 8.6-50 MG per tablet Take 1 tablet by mouth 2 times daily 50 tablet 0    aspirin 81 MG tablet Take 1 tablet by mouth daily 90 tablet 3     No current facility-administered medications for this visit. Facility-Administered Medications Ordered in Other Visits   Medication Dose Route Frequency Provider Last Rate Last Dose    lidocaine 1 % (PF) injection 0.1-0.5 mL  0.1-0.5 mL Intradermal Once Ismael Rodriguez MD          No Known Allergies     Review of Systems:  Review of systems performed and negative with the exception of the above findings    OBJECTIVE:  /82   Wt 170 lb (77.1 kg)   BMI 25.85 kg/m²      Physical Exam:  General appearance: alert, appears stated age, cooperative and no distress  Abdomen: Soft, nondistended, nontender, left abdominal drain in place with purulent output    Abstract on 04/16/2019   Component Date Value Ref Range Status    Sodium 04/10/2019 133  mmol/L Final    Chloride 04/10/2019 99  mmol/L Final    Potassium 04/10/2019 5.2  mmol/L Final    BUN 04/10/2019 18  mg/dL Final    CREATININE 04/10/2019 0.82   Final    Glucose 04/10/2019 88  mg/dL Final    AST 04/10/2019 52  U/L Final    ALT 04/10/2019 29  U/L Final    Calcium 04/10/2019 8.7  mg/dL Final    Total Protein 04/10/2019 7.2   Final    CO2 04/10/2019 21  mmol/L Final    Alb 04/10/2019 2.9   Final    Alkaline Phosphatase 04/10/2019 136  U/L Final    Total Bilirubin 04/10/2019 0.5  0.1 - 1.4 mg/dL Final    CRP 04/10/2019 15.20   Final    WBC 04/10/2019 7.6  10^3/mL Final    Hemoglobin 04/10/2019 12.7* 13.5 - 17.5 g/dL Final    Hematocrit 04/10/2019 38.8* 41 - 53 % Final    Platelets 21/06/4917 727  K/µL Final    Lymphocytes % 04/10/2019 20  % Final    Monocytes % 04/10/2019 9  % Final    Eosinophils % 04/10/2019 2  % Final    Basophils % 04/10/2019 3  % Final    Neutrophils # 04/10/2019 4.99  /µL Final    Lymphocytes # 04/10/2019 1.51  /µL Final    Monocytes # 04/10/2019 0.71  /µL Final    Eosinophils # 04/10/2019 0.16  /µL Final    Basophils # 04/10/2019 0.21  /µL Final    RBC 04/10/2019 4.64  10^6/µL Final    MCV 04/10/2019 83.7  fL Final    MCH 04/10/2019 27.3  pg Final    MCHC 04/10/2019 32.7  g/dL Final    MPV 04/10/2019 8.0  fL Final    Sed Rate 04/10/2019 84   Final   Admission on 03/30/2019, Discharged on 04/07/2019   No results displayed because visit has over 200 results.       Orders Only on 03/29/2019   Component Date Value Ref Range Status    Glucose, UA POC 03/29/2019 100 mg   Final    Bilirubin, UA 03/29/2019 Moderate   Final    Ketones, UA 03/29/2019 Trace   Final    Spec Grav, UA 03/29/2019 1.025   Final    Blood, UA POC 03/29/2019 large   Final    pH, UA 03/29/2019 5.5   Final    Protein, UA POC 03/29/2019 >300 mg   Final    Urobilinogen, UA 03/29/2019 >8.0 EU   Final    Leukocytes, UA 03/29/2019 Trace   Final    Nitrite, UA 03/29/2019 Neg   Final   Orders Only on 03/29/2019   Component Date Value Ref Range Status    WBC 03/29/2019 29.3* 4.0 - 11.0 K/uL Final    RBC 03/29/2019 4.53  4.20 - 5.90 M/uL Final    Hemoglobin 03/29/2019 12.6* 13.5 - 17.5 g/dL Final    Hematocrit 03/29/2019 38.6* 40.5 - 52.5 % Final    MCV 03/29/2019 85.3  80.0 - 100.0 fL Final    MCH 03/29/2019 27.9  26.0 - 34.0 pg Final    MCHC 03/29/2019 32.7  31.0 - 36.0 g/dL Final    RDW 03/29/2019 13.9  12.4 - 15.4 % Final    Platelets 97/34/2659 268  135 - 450 K/uL Final    MPV 03/29/2019 9.7  5.0 - 10.5 fL Final    SLIDE REVIEW 03/29/2019 see below   Final    Slide review agrees with reported results    Neutrophils % 03/29/2019 72.0  % Final    Lymphocytes % 03/29/2019 5.0  % Final    Monocytes % 03/29/2019 5.0  % Final    Eosinophils % 03/29/2019 0.0  % Final    Basophils % 03/29/2019 0.0  % Final    Neutrophils # 03/29/2019 26.4* 1.7 - 7.7 K/uL Final    Lymphocytes # 03/29/2019 1.5  1.0 - 5.1 K/uL Final    Monocytes # 03/29/2019 1.5* 0.0 - 1.3 K/uL Final    Eosinophils # 03/29/2019 0.0  0.0 - 0.6 K/uL Final    Basophils # 03/29/2019 0.0  0.0 - 0.2 K/uL Final    Bands Relative 03/29/2019 18* 0 - 7 % Final    nRBC 03/29/2019 1* /100 WBC Final    @L=100    Toxic Granulation 03/29/2019 Present*  Final   Ancillary Orders on 03/29/2019   Component Date Value Ref Range Status    Organism 03/29/2019 Klebsiella oxytoca*  Final    Urine Culture, Routine 03/29/2019 10,000 CFU/ml   Final       Xr Hip Left (2-3 Views)    Result Date: 3/28/2019  EXAMINATION: 2 XRAY VIEWS OF THE LEFT HIP 3/28/2019 12:16 pm COMPARISON: None. HISTORY: ORDERING SYSTEM PROVIDED HISTORY: Left hip pain TECHNOLOGIST PROVIDED HISTORY: Reason for exam:->pain Ordering Physician Provided Reason for Exam: No known injury. Pain in crease of left leg. started 2 days ago. FINDINGS: The left hip is intact. No fracture or dislocation. There is mild degenerative change in both hip joints with sclerotic change to the acetabulum and mild osteophyte formation. Pelvic bones intact. Vascular phleboliths are present throughout the pelvis. No other significant soft tissue findings. No acute finding in the left hip. Ct Abscess Drainage W Cath Placement S&i    Result Date: 4/1/2019  PROCEDURE: CT GUIDED ABSCESS FLUID DRAIN MODERATE CONSCIOUS SEDATION 3/31/2019 HISTORY: ORDERING SYSTEM PROVIDED HISTORY: abdominal abscess TECHNOLOGIST PROVIDED HISTORY: Reason for exam:->abdominal abscess Ordering Physician Provided Reason for Exam: abdominal abscess drainage Acuity: Acute Type of Exam: Initial TECHNIQUE: Dose modulation, iterative reconstruction, and/or weight based adjustment of the mA/kV was utilized to reduce the radiation dose to as low as reasonably achievable. CONTRAST: None. SEDATION: 2 mgversed and 100 mcg fentanyl were titrated intravenously for moderate sedation monitored under my direction. Total intraservice time of sedation was 15 minutes. The patient's vital signs were monitored throughout the procedure and recorded in the patient's medical record by the nurse.  FLUOROSCOPY DOSE AND TYPE OR TIME AND EXPOSURES: 123 CT images, 360.7 mGy DESCRIPTION OF PROCEDURE: Informed consent was obtained after a detailed explanation of the procedure including risks, benefits, and alternatives. Universal protocol was observed. Limited CT imaging was performed through the lower abdomen and pelvis. An appropriate skin entry site was selected. The left groin is prepped and draped in sterile fashion. 1% lidocaine local anesthesia was used. Using CT guidance, a 5 [place] needle sheath was advanced into the inferior iliopsoas/left pelvic sidewall collection. Wire was advanced into the collection. The tract was dilated and a 12 [place] locking pigtail drainage catheter was placed into the collection. The catheter was placed to bulb suction. The catheter was secured to the skin using suture. The site was dressed and bandaged in sterile fashion. The patient tolerated procedure well without immediate complication. FINDINGS: 90 mL of cloudy tan fluid was aspirated and sent for culture per request ordering clinician. Technically successful CT-guided placement of a 12 [place] locking pigtail drainage catheter into the deep left iliopsoas fluid collection. Ct Abdomen Pelvis W Iv Contrast Additional Contrast? Oral    Result Date: 4/12/2019  EXAMINATION: CT OF THE ABDOMEN AND PELVIS WITH CONTRAST 4/12/2019 12:02 pm TECHNIQUE: CT of the abdomen and pelvis was performed with the administration of intravenous contrast. Multiplanar reformatted images are provided for review. Dose modulation, iterative reconstruction, and/or weight based adjustment of the mA/kV was utilized to reduce the radiation dose to as low as reasonably achievable. COMPARISON: 03/30/2019 HISTORY: ORDERING SYSTEM PROVIDED HISTORY: Psoas abscess [facility]) TECHNOLOGIST PROVIDED HISTORY: Additional Contrast?->Oral Ordering Physician Provided Reason for Exam: Psoas abscess Acuity: Unknown Type of Exam: Unknown FINDINGS: Lower Chest: Coronary artery calcification is seen. De pendant opacity is seen at the lung bases. There is subtle fusiform bronchiectasis seen at the lung bases.  Organs: No perisplenic fluid. Right adrenal gland is normal.  There is mild thickening of left adrenal gland. There is lobular contour left kidney. No hydronephrosis. Hypodense nodules seen throughout the left kidney measuring 10 mm in size or less, likely cyst. There is lobular contour the right kidney. No hydronephrosis. Scattered circumscribed hypodense nodules seen throughout the right kidney measuring 6 mm in size or less. No intrahepatic ductal dilatation. No perihepatic fluid Hypodense nodule seen in the left hepatic lobe measuring 8-9 mm, similar to prior. No peripancreatic inflammatory change GI/Bowel: Moderate stool load seen in the colon. Scattered colonic diverticula are seen. No bowel obstruction Pelvis: Atrial drainage catheter is now seen on the left. There has been rapid decrease in size of the abscess involving the left psoas and iliopsoas muscle. A small collection remains in the left iliopsoas, measuring 1.8 cm x 3.9 cm. Small collection remains in the left psoas measuring 2.0 cm x 1.7 cm. Cranial caudal, collection measures approximately 14.0 cm. There is surrounding injection of the fat. Peritoneum/Retroperitoneum: Small periaortic nodes are seen on the left, similar to prior, likely reactive. Bones/Soft Tissues: Bones appear unchanged. Scattered degenerative changes are seen     Marked reduction of left psoas-iliopsoas abscess. Drain appears in good position. Small reactive periaortic nodes are seen     Ct Abdomen Pelvis W Iv Contrast Additional Contrast? None    Result Date: 3/30/2019  EXAMINATION: CT OF THE ABDOMEN AND PELVIS WITH CONTRAST 3/30/2019 6:09 pm TECHNIQUE: CT of the abdomen and pelvis was performed with the administration of intravenous contrast. Multiplanar reformatted images are provided for review. Dose modulation, iterative reconstruction, and/or weight based adjustment of the mA/kV was utilized to reduce the radiation dose to as low as reasonably achievable. vasectomy. Postoperative scarring in the anterior abdominal wall. Mild subcutaneous fat stranding in the left anterolateral abdominal wall. Status post median sternotomy. No acute osseous abnormality. 1. Large lobular fluid collection along the left pelvic sidewall involving the distal psoas and iliacus muscles measuring approximately 16.2 x 8.1 x 4.1 cm with mild adjacent stranding. This may represent an abscess or liquefying hematoma. 2. Status post prostatectomy. Xr Chest Portable    Result Date: 4/5/2019  EXAMINATION: SINGLE XRAY VIEW OF THE CHEST 4/5/2019 2:32 pm COMPARISON: 01/15/2019 HISTORY: ORDERING SYSTEM PROVIDED HISTORY: shortness of breath TECHNOLOGIST PROVIDED HISTORY: Reason for exam:->shortness of breath Ordering Physician Provided Reason for Exam: shortness of breath Acuity: Unknown Type of Exam: Unknown FINDINGS: Right upper extremity PICC line is present. Status post median sternotomy. Prominence of pulmonary vascularity. No pleural effusion or pneumothorax. Cardiac and mediastinal silhouettes are similar to prior. Tortuosity of aorta. Pulmonary vascular congestion. Us Dup Lower Extremity Left Kiran    Result Date: 3/29/2019  EXAMINATION: DUPLEX VENOUS ULTRASOUND OF THE LEFT LOWER EXTREMITY 3/29/2019 2:46 pm TECHNIQUE: Duplex ultrasound and Doppler images were obtained of the left lower extremity. COMPARISON: None. HISTORY: ORDERING SYSTEM PROVIDED HISTORY: Left hip pain TECHNOLOGIST PROVIDED HISTORY: Ordering Physician Provided Reason for Exam: left hip, groin pain, recent prostate surgery 2/2019 FINDINGS: The visualized veins of the left lower extremity are patent and free of echogenic thrombus. The veins are normally compressible and have normal phasic flow. No evidence of DVT in the left lower extremity. ASSESSMENT:  Left psoas abscess, s/p percutaneous drainage 3/31/19  Recent robot prostatectomy  CAD, s/p CABG    PLAN:  1.   Continue antibiotics per infectious

## 2019-04-16 NOTE — PATIENT INSTRUCTIONS
1.  Continue antibiotics per infectious disease  2. Drainage good position is confirmed with CT. Does not need repeat imaging unless symptoms worsen or recur. Continue drain monitoring until output less than 5-10 mL per day  3. May return to work from surgical standpoint   4.  Return to general surgery office in 1-2 weeks to monitor drain output

## 2019-04-17 ENCOUNTER — CARE COORDINATION (OUTPATIENT)
Dept: CASE MANAGEMENT | Age: 69
End: 2019-04-17

## 2019-04-17 NOTE — CARE COORDINATION
[facility] Transitions Follow Up Call    2019    Patient: Mo Bauer  Patient : 1950   MRN: <[id number]>  Reason for Admission: Sepsis    Discharge Date: 19 RARS: Readmission Risk Score: 19         Spoke with: Mo Bauer  Patient stated he is doing much better. He is done with oral med today and IV antibiotics will be for another week. He still has TREVIN drain in and is draining 30ml per day. He seen physician yesterday. No new medication. Denies concerns or issues. Care Transitions Subsequent and Final Call    Subsequent and Final Calls  Care Transitions Interventions  Other Interventions:             Follow Up  Future Appointments   Date Time Provider Carol Rincon   2019 10:30 AM Lady Olszewski, DO [facility]   2019 10:40 AM Nathan Schaffer MD FF INFCT DIS [facility]   5/3/2019 11:00 AM Leida Giles MD FF G&L SURG [facility]   2019  9:30 AM Renay Lombard, MD Michale Noss, RN

## 2019-04-22 ENCOUNTER — CARE COORDINATION (OUTPATIENT)
Dept: CASE MANAGEMENT | Age: 69
End: 2019-04-22

## 2019-04-22 NOTE — CARE COORDINATION
[facility] Transitions Follow Up Call    2019    Patient: Guillermo White  Patient : 1950   MRN: [id number]  Reason for Admission:   Discharge Date: 19 RARS: Readmission Risk Score: 19    Final transition call made, contact info left on vm as well as Reminded patient that if they have any questions/concerns at anytime, they can always call PCP/specialist as they have an MD on call .         Follow Up  Future Appointments   Date Time Provider Carol Rincon   2019 10:20 AM Keiko Suarez MD FP INFECT DI [facility]   5/3/2019  9:30 AM Melvin Tello DO MIL[facility]  [facility]   5/3/2019 11:00 AM Yany Garcia MD FF G&L SURG [facility]   2019  9:30 AM MD Brittni Palomino [facility]       Vitor Fulton RN

## 2019-04-24 ENCOUNTER — TELEPHONE (OUTPATIENT)
Dept: INFECTIOUS DISEASES | Age: 69
End: 2019-04-24

## 2019-04-24 NOTE — TELEPHONE ENCOUNTER
Patient called to confirm that he had a f/u appointment made.  This writer made patient aware that he is scheduled for 5/2/19 @ 10:20 a.m with Dr. Shefali Joy

## 2019-04-25 ENCOUNTER — TELEPHONE (OUTPATIENT)
Dept: INFECTIOUS DISEASES | Age: 69
End: 2019-04-25

## 2019-04-25 NOTE — TELEPHONE ENCOUNTER
Spoke with the patient and let him know that I called to set him up at the Wound Care center at [facility] for his out patient dressing changes. Patient wants to go to [facility] now. Advised patient that I will set him up at [facility].    Patient has had a CT of the Abd/Pelvis with contrast on 4/12/19  Would Dr. Ariela Moreland like to repeat this test?

## 2019-04-25 NOTE — TELEPHONE ENCOUNTER
Pelvic abscess culture from 3/31/19 was positive for MSSA. The patient is supposed to see me on 5/2/19.     Is currently on IV cefazolin    Will make a decision about repeat CT scan at that time depending upon his clinical progress      Marquez Dean MD, MPH  4/25/2019, 10:21 AM  [facility] Infectious Disease   Office: [phone]  Fax: [phone]  Tuesday AM clinic:   [address] [facility], [place] [address]  Thursday AM clinic: [address] [facility]

## 2019-04-26 ENCOUNTER — TELEPHONE (OUTPATIENT)
Dept: INFECTIOUS DISEASES | Age: 69
End: 2019-04-26

## 2019-04-26 ENCOUNTER — HOSPITAL ENCOUNTER (OUTPATIENT)
Dept: NURSING | Age: 69
Setting detail: INFUSION SERIES
Discharge: HOME | End: 2019-04-26
Payer: COMMERCIAL

## 2019-04-26 ENCOUNTER — HOSPITAL ENCOUNTER (OUTPATIENT)
Age: 69
Discharge: HOME | End: 2019-04-26
Payer: COMMERCIAL

## 2019-04-26 DIAGNOSIS — R19.7 DIARRHEA OF PRESUMED INFECTIOUS ORIGIN: Primary | ICD-10-CM

## 2019-04-26 DIAGNOSIS — K68.12 PSOAS ABSCESS (HCC): Primary | ICD-10-CM

## 2019-04-26 LAB
A/G RATIO: 1.1 (ref 1.1–2.2)
ALBUMIN SERPL-MCNC: 3.7 G/DL (ref 3.4–5)
ALP BLD-CCNC: 84 U/L (ref 40–129)
ALT SERPL-CCNC: 13 U/L (ref 10–40)
ANION GAP SERPL CALCULATED.3IONS-SCNC: 7 MMOL/L (ref 3–16)
AST SERPL-CCNC: 22 U/L (ref 15–37)
BASOPHILS ABSOLUTE: 0.1 K/UL (ref 0–0.2)
BASOPHILS RELATIVE PERCENT: 1.2 %
BILIRUB SERPL-MCNC: 0.3 MG/DL (ref 0–1)
BUN BLDV-MCNC: 16 MG/DL (ref 7–20)
C-REACTIVE PROTEIN: 5.3 MG/L (ref 0–5.1)
CALCIUM SERPL-MCNC: 9.2 MG/DL (ref 8.3–10.6)
CHLORIDE BLD-SCNC: 105 MMOL/L (ref 99–110)
CO2: 25 MMOL/L (ref 21–32)
CREAT SERPL-MCNC: 0.7 MG/DL (ref 0.8–1.3)
EOSINOPHILS ABSOLUTE: 0.2 K/UL (ref 0–0.6)
EOSINOPHILS RELATIVE PERCENT: 1.9 %
GFR AFRICAN AMERICAN: >60
GFR NON-AFRICAN AMERICAN: >60
GLOBULIN: 3.5 G/DL
GLUCOSE BLD-MCNC: 102 MG/DL (ref 70–99)
HCT VFR BLD CALC: 37.2 % (ref 40.5–52.5)
HEMOGLOBIN: 12.6 G/DL (ref 13.5–17.5)
LYMPHOCYTES ABSOLUTE: 1.7 K/UL (ref 1–5.1)
LYMPHOCYTES RELATIVE PERCENT: 16.3 %
MCH RBC QN AUTO: 28.5 PG (ref 26–34)
MCHC RBC AUTO-ENTMCNC: 34 G/DL (ref 31–36)
MCV RBC AUTO: 83.9 FL (ref 80–100)
MONOCYTES ABSOLUTE: 1 K/UL (ref 0–1.3)
MONOCYTES RELATIVE PERCENT: 9.7 %
NEUTROPHILS ABSOLUTE: 7.3 K/UL (ref 1.7–7.7)
NEUTROPHILS RELATIVE PERCENT: 70.9 %
PDW BLD-RTO: 14.1 % (ref 12.4–15.4)
PLATELET # BLD: 268 K/UL (ref 135–450)
PMV BLD AUTO: 7.4 FL (ref 5–10.5)
POTASSIUM SERPL-SCNC: 4.6 MMOL/L (ref 3.5–5.1)
RBC # BLD: 4.44 M/UL (ref 4.2–5.9)
SEDIMENTATION RATE, ERYTHROCYTE: 19 MM/HR (ref 0–20)
SODIUM BLD-SCNC: 137 MMOL/L (ref 136–145)
TOTAL PROTEIN: 7.2 G/DL (ref 6.4–8.2)
WBC # BLD: 10.3 K/UL (ref 4–11)

## 2019-04-26 PROCEDURE — 80053 COMPREHEN METABOLIC PANEL: CPT

## 2019-04-26 PROCEDURE — 36592 COLLECT BLOOD FROM PICC: CPT

## 2019-04-26 PROCEDURE — 85652 RBC SED RATE AUTOMATED: CPT

## 2019-04-26 PROCEDURE — 85025 COMPLETE CBC W/AUTO DIFF WBC: CPT

## 2019-04-26 PROCEDURE — 86140 C-REACTIVE PROTEIN: CPT

## 2019-04-26 PROCEDURE — 99211 OFF/OP EST MAY X REQ PHY/QHP: CPT

## 2019-04-26 RX ORDER — VANCOMYCIN HYDROCHLORIDE 250 MG/1
250 CAPSULE ORAL 4 TIMES DAILY
Qty: 40 CAPSULE | Refills: 0 | Status: SHIPPED | OUTPATIENT
Start: 2019-04-26 | End: 2019-05-06

## 2019-04-26 NOTE — PROGRESS NOTES
Picc line dressing dry and intact lab work drawn and sent to lab as ordered. Dressing to site changed no edema. redness or drainage. Redressed per protocol.  Flushed with 20 cc normal saline easily discharged to home in stable condition

## 2019-04-26 NOTE — TELEPHONE ENCOUNTER
Please hold IV cefazolin over the weekend. Send stool for C. Diff    Start patient on oral vancomycin 250 mg every 6 hours for 10 days    Orders in place. Ask him to call us on Monday to see how he is doing.       Yesica Jurado MD, MPH  4/26/2019, 10:32 AM  [facility] Infectious Disease   Office: [phone]  Fax: [phone]  Tuesday AM clinic:   [address] [place] [address]  Thursday AM clinic: [address] [facility]

## 2019-04-26 NOTE — TELEPHONE ENCOUNTER
Thank you! Agree with above. If he has further questions, will be glad to give him a call.       Eden Mendiola MD, MPH  4/26/2019, 12:47 PM  [facility] Infectious Disease   Office: [phone]  Fax: [phone]  Tuesday AM clinic:   [address] [place], [place] [address]  Thursday AM clinic: [address] [facility]

## 2019-04-26 NOTE — TELEPHONE ENCOUNTER
Spoke with the patient and explained to hold the Cefazolin and start the oral Vancomycin. He said that someone called him and told him that you were extending the Cefazolin for 2 more weeks, [facility] said that they have his end date as 4/30/19. I told him that you are waiting until he calls Monday to make any more decisions and he has an appointment with you on Thursday.

## 2019-04-27 ENCOUNTER — HOSPITAL ENCOUNTER (OUTPATIENT)
Age: 69
Discharge: HOME | End: 2019-04-27
Payer: COMMERCIAL

## 2019-04-27 LAB — C DIFFICILE TOXIN, EIA: NORMAL

## 2019-04-27 PROCEDURE — 87449 NOS EACH ORGANISM AG IA: CPT

## 2019-04-27 PROCEDURE — 87493 C DIFF AMPLIFIED PROBE: CPT

## 2019-04-27 PROCEDURE — 87324 CLOSTRIDIUM AG IA: CPT

## 2019-04-28 LAB
CLOSTRIDIUM DIFFICILE DNA AMPLIFICATION: ABNORMAL
CLOSTRIDIUM DIFFICILE DNA AMPLIFICATION: ABNORMAL
ORGANISM: ABNORMAL

## 2019-04-29 ENCOUNTER — TELEPHONE (OUTPATIENT)
Dept: INFECTIOUS DISEASES | Age: 69
End: 2019-04-29

## 2019-04-29 NOTE — TELEPHONE ENCOUNTER
Patient would like to know if his PICC can be removed at his appt on 5/2. 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  Please advise

## 2019-05-02 ENCOUNTER — OFFICE VISIT (OUTPATIENT)
Dept: INFECTIOUS DISEASES | Age: 69
End: 2019-05-02
Payer: COMMERCIAL

## 2019-05-02 ENCOUNTER — TELEPHONE (OUTPATIENT)
Dept: INFECTIOUS DISEASES | Age: 69
End: 2019-05-02

## 2019-05-02 ENCOUNTER — HOSPITAL ENCOUNTER (OUTPATIENT)
Dept: CT IMAGING | Age: 69
Discharge: HOME | End: 2019-05-02
Payer: COMMERCIAL

## 2019-05-02 ENCOUNTER — HOSPITAL ENCOUNTER (OUTPATIENT)
Age: 69
Discharge: HOME | End: 2019-05-02
Payer: COMMERCIAL

## 2019-05-02 VITALS
OXYGEN SATURATION: 98 % | DIASTOLIC BLOOD PRESSURE: 60 MMHG | TEMPERATURE: 98.4 F | BODY MASS INDEX: 26.85 KG/M2 | SYSTOLIC BLOOD PRESSURE: 118 MMHG | RESPIRATION RATE: 16 BRPM | WEIGHT: 176.6 LBS | HEART RATE: 80 BPM

## 2019-05-02 DIAGNOSIS — Z90.79 STATUS POST PROSTATECTOMY: ICD-10-CM

## 2019-05-02 DIAGNOSIS — E66.3 OVERWEIGHT: ICD-10-CM

## 2019-05-02 DIAGNOSIS — A49.01 MSSA (METHICILLIN SUSCEPTIBLE STAPHYLOCOCCUS AUREUS) INFECTION: ICD-10-CM

## 2019-05-02 DIAGNOSIS — A04.72 C. DIFFICILE DIARRHEA: ICD-10-CM

## 2019-05-02 DIAGNOSIS — K68.12 PSOAS ABSCESS (HCC): ICD-10-CM

## 2019-05-02 DIAGNOSIS — Z79.2 RECEIVING INTRAVENOUS ANTIBIOTIC TREATMENT AS OUTPATIENT: ICD-10-CM

## 2019-05-02 DIAGNOSIS — Z71.89 ENCOUNTER FOR MEDICATION COUNSELING: ICD-10-CM

## 2019-05-02 DIAGNOSIS — K68.12 PSOAS ABSCESS (HCC): Primary | ICD-10-CM

## 2019-05-02 DIAGNOSIS — Z85.46 HISTORY OF PROSTATE CANCER: ICD-10-CM

## 2019-05-02 DIAGNOSIS — Z95.1 S/P CABG X 5: ICD-10-CM

## 2019-05-02 DIAGNOSIS — I25.83 CORONARY ARTERY DISEASE DUE TO LIPID RICH PLAQUE: ICD-10-CM

## 2019-05-02 DIAGNOSIS — Z71.3 WEIGHT LOSS COUNSELING, ENCOUNTER FOR: ICD-10-CM

## 2019-05-02 DIAGNOSIS — I25.10 CORONARY ARTERY DISEASE INVOLVING NATIVE CORONARY ARTERY OF NATIVE HEART WITHOUT ANGINA PECTORIS: ICD-10-CM

## 2019-05-02 DIAGNOSIS — Z45.2 PICC (PERIPHERALLY INSERTED CENTRAL CATHETER) REMOVAL: ICD-10-CM

## 2019-05-02 DIAGNOSIS — I25.10 CORONARY ARTERY DISEASE DUE TO LIPID RICH PLAQUE: ICD-10-CM

## 2019-05-02 DIAGNOSIS — Z45.2 PICC (PERIPHERALLY INSERTED CENTRAL CATHETER) IN PLACE: ICD-10-CM

## 2019-05-02 PROCEDURE — 84153 ASSAY OF PSA TOTAL: CPT

## 2019-05-02 PROCEDURE — 74177 CT ABD & PELVIS W/CONTRAST: CPT

## 2019-05-02 PROCEDURE — 36415 COLL VENOUS BLD VENIPUNCTURE: CPT

## 2019-05-02 PROCEDURE — 6360000004 HC RX CONTRAST MEDICATION: Performed by: INTERNAL MEDICINE

## 2019-05-02 PROCEDURE — 99215 OFFICE O/P EST HI 40 MIN: CPT | Performed by: INTERNAL MEDICINE

## 2019-05-02 RX ORDER — DOXYCYCLINE HYCLATE 100 MG
100 TABLET ORAL 2 TIMES DAILY
Qty: 42 TABLET | Refills: 0 | Status: SHIPPED | OUTPATIENT
Start: 2019-05-02 | End: 2019-05-23

## 2019-05-02 RX ADMIN — IOHEXOL 50 ML: 240 INJECTION, SOLUTION INTRATHECAL; INTRAVASCULAR; INTRAVENOUS; ORAL at 13:24

## 2019-05-02 RX ADMIN — IOPAMIDOL 75 ML: 755 INJECTION, SOLUTION INTRAVENOUS at 13:24

## 2019-05-02 ASSESSMENT — ENCOUNTER SYMPTOMS
EYE REDNESS: 0
BACK PAIN: 0
ABDOMINAL PAIN: 0
SHORTNESS OF BREATH: 0
DIARRHEA: 0
COUGH: 0
WHEEZING: 0
EYE DISCHARGE: 0
CONSTIPATION: 0
TROUBLE SWALLOWING: 0
RHINORRHEA: 0
NAUSEA: 0
SORE THROAT: 0

## 2019-05-02 NOTE — TELEPHONE ENCOUNTER
Calling regarding ct results, patient was advised by radiology not to eat until he heard from physician.  Patient can be reached at [phone]

## 2019-05-02 NOTE — TELEPHONE ENCOUNTER
Patient advised. Loly Tenorio that he had two more days worth of the antibiotic that he is taking for c-diff and he would go ahead and finish that out.

## 2019-05-02 NOTE — PROGRESS NOTES
RIGHT SIDE NERVE SPARING performed by Lili Morales MD at [address] [facility]       Current Medications: All medications were reviewed by me during this visit  Current Outpatient Medications   Medication Sig Dispense Refill    Bacillus Coagulans-Inulin (PROBIOTIC FORMULA PO) Take by mouth      doxycycline hyclate (VIBRA-TABS) 100 MG tablet Take 1 tablet by mouth 2 times daily for 21 days 42 tablet 0    vancomycin (VANCOCIN) 250 MG capsule Take 1 capsule by mouth 4 times daily for 10 days 40 capsule 0    atorvastatin (LIPITOR) 10 MG tablet Take 1 tablet by mouth nightly 90 tablet 3    aspirin 81 MG tablet Take 1 tablet by mouth daily 90 tablet 3    fluticasone (FLONASE) 50 MCG/ACT nasal spray 1 spray by Each Nare route daily 1 Bottle 3    senna-docusate (PERICOLACE) 8.6-50 MG per tablet Take 1 tablet by mouth 2 times daily 50 tablet 0     No current facility-administered medications for this visit. Facility-Administered Medications Ordered in Other Visits   Medication Dose Route Frequency Provider Last Rate Last Dose    lidocaine 1 % (PF) injection 0.1-0.5 mL  0.1-0.5 mL Intradermal Once Thor Quinn MD             Family history: All family history was reviewed by me today    Family History   Problem Relation Age of Onset    Heart Disease Father     Stroke Mother     Other Sister        No family history of immunocompromising or autoimmune conditions. No h/o TB in in family or contacts    SocialHistory:  All social and epidemiologic history was reviewed and updated be me in detail today.     Social History     Socioeconomic History    Marital status:      Spouse name: Caitlin Simmons    Number of children: 2    Years of education: 15    Highest education level: None   Occupational History    None   Social Needs    Financial resource strain: None    Food insecurity:     Worry: None     Inability: None    Transportation needs:     Medical: None     Non-medical: None   Tobacco Use    Smoking EXAM:      Vitals:    05/02/19 1015   BP: 118/60   Pulse: 80   Resp: 16   Temp: 98.4 °F (36.9 °C)   SpO2: 98%       Physical Exam   Constitutional: He is oriented to person, place, and time. He appears well-developed. HENT:   Head: Normocephalic and atraumatic. Mouth/Throat: Oropharynx is clear and moist. No oropharyngeal exudate. Eyes: Pupils are equal, round, and reactive to light. Conjunctivae and EOM are normal. Right eye exhibits no discharge. Left eye exhibits no discharge. No scleral icterus. Neck: Normal range of motion. Neck supple. Cardiovascular: Normal rate and regular rhythm. Exam reveals no friction rub. No murmur heard. Pulmonary/Chest: No stridor. No respiratory distress. He has no wheezes. He has no rales. Abdominal: Soft. Bowel sounds are normal. There is no tenderness. There is no rebound and no guarding. TREVIN drain noted in left lower quadrant. Minimal drainage noted   Musculoskeletal: Normal range of motion. He exhibits no edema or tenderness. Lymphadenopathy:     He has no cervical adenopathy. He has no axillary adenopathy. Neurological: He is alert and oriented to person, place, and time. He exhibits normal muscle tone. Skin: Skin is warm and dry. No rash noted. He is not diaphoretic. No erythema. PICC line noted in the left arm, site healthy. Psychiatric: He has a normal mood and affect. His behavior is normal.   Nursing note and vitals reviewed.            DATA:  All available lab data wasreviewed by me during this visit    Last CBC:  Lab Results   Component Value Date    WBC 10.3 04/26/2019    HGB 12.6 (L) 04/26/2019    HCT 37.2 (L) 04/26/2019    MCV 83.9 04/26/2019     04/26/2019    LYMPHOPCT 16.3 04/26/2019    RBC 4.44 04/26/2019    MCH 28.5 04/26/2019    MCHC 34.0 04/26/2019    RDW 14.1 04/26/2019       Last BMP:  Lab Results   Component Value Date     04/26/2019    K 4.6 04/26/2019    K 3.7 03/30/2019     04/26/2019    CO2 25 04/26/2019 BUN 16 04/26/2019    CREATININE 0.7 04/26/2019    GLUCOSE 102 04/26/2019    CALCIUM 9.2 04/26/2019        Hepatic Function Panel:  Lab Results   Component Value Date    ALKPHOS 84 04/26/2019    ALT 13 04/26/2019    AST 22 04/26/2019    PROT 7.2 04/26/2019    BILITOT 0.3 04/26/2019    BILIDIR 0.5 04/02/2019    IBILI 0.3 04/02/2019    LABALBU 3.7 04/26/2019       Last CK: No results found for: CKTOTAL    Last ESR:  Lab Results   Component Value Date    SEDRATE 19 04/26/2019       Last CRP:  Lab Results   Component Value Date    CRP 5.3 (H) 04/26/2019         Imaging: All pertinent images and reports for the current visit were reviewed by me during this visit. Outside records:    Labs, Microbiology,Radiology and pertinent results from Care everywhere, if available, were reviewed as a part of the consultation. Allergies: All allergies data was reviewed by me during this visit  Patient has no known allergies.     Microbiology:   All available micro data was reviewed by me during this visit    ·  Left pelvic abscess fluid culture  - collected on 3/31/19: MSSA      Staphylococcus aureus (1)     Antibiotic Interpretation JING Status    ceFAZolin Sensitive <=4 mcg/mL     ciprofloxacin Intermediate 2 mcg/mL     clindamycin Sensitive <=0.5 mcg/mL     erythromycin Sensitive <=0.5 mcg/mL     oxacillin Sensitive 0.5 mcg/mL     tetracycline Sensitive <=4 mcg/mL     trimethoprim-sulfamethoxazole Sensitive <=0.5/9.5 mcg/mL         Problem list:       Patient Active Problem List   Diagnosis Code    Chest pain on exertion R07.9    Exertional chest pain R07.9    ACS (acute coronary syndrome) (HCC) I24.9    Abnormal stress test R94.39    Coronary artery disease involving native coronary artery of native heart without angina pectoris I25.10    S/P CABG x 5 Z95.1    Elevated PSA R97.20    Mixed hyperlipidemia E78.2    Prostate cancer ([facility] [address] C61    Psoas abscess (HCC) K68.12    Leukocytosis D72.829    Fever R50.9    Sepsis due to methicillin susceptible Staphylococcus aureus (HCC) A41.01    History of prostate cancer Z85.46    Status post prostatectomy Z90.79    Coronary artery disease due to lipid rich plaque I25.10, I25.83    Overweight M56.5    Complicated UTI (urinary tract infection) N39.0    Receiving intravenous antibiotic treatment as outpatient Z79.2    Weight loss counseling, encounter for Z71.3         IMPRESSION:    The patient is a 79 y.o. old male who  has a past medical history of CAD (coronary artery disease), Mixed hyperlipidemia (11/26/2018), and Prostate cancer ([facility] [address] (1/21/2019). was seen today for following problems:    1. Psoas abscess (HCC)    2. C. difficile diarrhea    3. PICC (peripherally inserted central catheter) in place    4. PICC (peripherally inserted central catheter) removal    5. MSSA (methicillin susceptible Staphylococcus aureus) infection    6. Encounter for medication counseling    7. Overweight    8. Weight loss counseling, encounter for    9. Coronary artery disease involving native coronary artery of native heart without angina pectoris    10. Receiving intravenous antibiotic treatment as outpatient    11. History of prostate cancer    12. Status post prostatectomy    13. Coronary artery disease due to lipid rich plaque    14. S/P CABG x 5        Discussion:      The patient had a CT scan of the left iliopsoas region done on 4/12/19. It was done with IV contrast and showed a marked reduction in the size of the abscess. The left previous or sepsis was present 3.9 x 1.8 cm in that and the left psoas abscess was present 2 cm x 1.7 cm    The patient developed a C. diff and hence has been off IV cefazolin  since 4/26/2019. He is now on oral vancomycin for C. diff diarrhea.       RECOMMENDATIONS:      Diagnostic Workup:    · Will order CT scan of abdomen and pelvis with IV contrast to follow up on the abdominal abscess is  · Continue to follow fever curve  at home      Antimicrobials:    · I have removed the PICC line in the office today. IV cefazolin was held on 4/26/19  · See PICC line removal note below  · I have started the patient on oral vancomycin 250 mg every 6 hours which is helping with the C. diff diarrhea. We'll let him continue that  · Will order by mouth doxycycline 100 mg every 12 hours for 3 weeks  · Patient was advised to continue over-the-counter probiotic twice daily  · Will follow-up on the CT scan of abdomen and pelvis and the size of her abdominal abscesses and based on that we will make a determination if doxycycline needs to be extended further if not  · Discussed the importance of compliance with antibiotic  · The patient has an appointment with Dr. Arnita Gosselin tomorrow. It's possible that the TREVIN drain may get removed soon    PICC line Removal Note:    The PICC line was removed from right arm after the site was prepped and cleaned with alcohol swabs per protocol. PICC catheter tip was visualized and intact. Pressure dressing applied with sterile dressing and the site was draped with Coban. No redness, ecchymosis, edema, swelling, or drainage noted at site. Instructions were provided on post PICC discharge care, including followup notification instructions.     Labs ordered today in EPIC:    Orders Placed This Encounter   Procedures    CT ABDOMEN PELVIS W IV CONTRAST Additional Contrast? Oral     Standing Status:   Future     Standing Expiration Date:   5/2/2020     Order Specific Question:   Additional Contrast?     Answer:   Oral     Order Specific Question:   Reason for exam:     Answer:   f/u on abdominal abscesses       Medications ordered today in EPIC:    Orders Placed This Encounter   Medications    doxycycline hyclate (VIBRA-TABS) 100 MG tablet     Sig: Take 1 tablet by mouth 2 times daily for 21 days     Dispense:  42 tablet     Refill:  0         Patient education and counseling:    · The patient was educated in detail about the side-effects of various antibiotics and things to watch for like new rashes, lip swelling, severereaction, worsening diarrhea, break through fever etc.  · Patient was instructed to stop antibiotics and call our office if these problems were to occur, or go to nearest ER ifexperiencing severe symptoms. · Discussed patient's condition and what to expect. All of the patient's questions were addressed in a satisfactory manner and patient verbalizedunderstanding all instructions. Doxycyline/minocycline related instructions: Take Doxycyline/minocycline with a full glass of water and stay upright or sitting up after taking it for 30 minutes as it can cause food pipe irritation (pill esophagitis). Use sunscreen when going in bright sun while on Doxycycline/minocycline as it can cause photosensitivity. Take 1 hour before or 2 hour after dairy, calcium, iron, magnesium, aluminum or zinc.    Weight loss counseling:    Extensive weight loss counseling was done. It is important to set a realistic weight loss goal. First goal should be to avoid gaining more weight and staying at current weight (or within 5 percent). People at high risk of developing diabetes who are able to lose 5 percent of their body weight and maintain this weight will reduce their risk of developing diabetes by about 50 percent and reduce their blood pressure. Losing more than 15 percent of  body weight and staying at this weight is an extremely good result, even if you never reach your \"dream\" or \"ideal\" weight. Lifestyle changes including changing eating habits, substituting excess carbohydrates with proteins, stress reduction, using self-help programs like Weight Watchers®, Overeaters Anonymous®, and Take Off Pounds Sensibly (TOPS)© , following DASH diet and increasing exercise or walking briskly daily for half hour to and hour 5-7 days a week was suggested among other measures.  Information was given about various weight loss education programs and their websites like www.cdc.gov/healthyweight, www.choosemyplate.gov and www.health.gov/dietaryguidelines/    Follow-up:    · Follow-up with me in ID clinic when necessary      TIME SPENT TODAY:    - Spent over 42  minutes on visit (including interval history, physical exam, review of data including labs,cultures, imaging, development and implementation of treatment plan and coordination of care). - Over 50% of time spent with pt counseling andeducation. Please note that this chart was generated using Dragon dictation software. Although every effort was made to ensure the accuracy of this automatedtranscription, some errors in transcription may have occurred inadvertently. If you may need any clarification, please do not hesitate to contact me through [facility] or at the phone number provided below with my electronicsignature. Robbyu for involving me inthe care of your patient. If you have any additional questions, please do not hesitate to contact me.     Maribel Conroy MD, MPH  5/2/2019, 10:52 AM  [facility] Infectious Disease   Office: [phone]  Fax: [phone]  Tuesday AM clinic:   [address] [facility], [facility] [address]  Thursday AM [id number] [facility]

## 2019-05-03 ENCOUNTER — OFFICE VISIT (OUTPATIENT)
Dept: SURGERY | Age: 69
End: 2019-05-03
Payer: COMMERCIAL

## 2019-05-03 ENCOUNTER — HOSPITAL ENCOUNTER (OUTPATIENT)
Dept: NURSING | Age: 69
Discharge: HOME | End: 2019-05-03

## 2019-05-03 VITALS
DIASTOLIC BLOOD PRESSURE: 80 MMHG | SYSTOLIC BLOOD PRESSURE: 110 MMHG | WEIGHT: 175 LBS | HEART RATE: 77 BPM | BODY MASS INDEX: 26.61 KG/M2

## 2019-05-03 DIAGNOSIS — K68.12 PSOAS ABSCESS (HCC): Primary | ICD-10-CM

## 2019-05-03 LAB — PROSTATE SPECIFIC ANTIGEN: 0.02 NG/ML (ref 0–4)

## 2019-05-03 PROCEDURE — 4040F PNEUMOC VAC/ADMIN/RCVD: CPT | Performed by: SURGERY

## 2019-05-03 PROCEDURE — 1111F DSCHRG MED/CURRENT MED MERGE: CPT | Performed by: SURGERY

## 2019-05-03 PROCEDURE — 1123F ACP DISCUSS/DSCN MKR DOCD: CPT | Performed by: SURGERY

## 2019-05-03 PROCEDURE — 1036F TOBACCO NON-USER: CPT | Performed by: SURGERY

## 2019-05-03 PROCEDURE — G8598 ASA/ANTIPLAT THER USED: HCPCS | Performed by: SURGERY

## 2019-05-03 PROCEDURE — G8419 CALC BMI OUT NRM PARAM NOF/U: HCPCS | Performed by: SURGERY

## 2019-05-03 PROCEDURE — G8427 DOCREV CUR MEDS BY ELIG CLIN: HCPCS | Performed by: SURGERY

## 2019-05-03 PROCEDURE — 3017F COLORECTAL CA SCREEN DOC REV: CPT | Performed by: SURGERY

## 2019-05-03 PROCEDURE — 99213 OFFICE O/P EST LOW 20 MIN: CPT | Performed by: SURGERY

## 2019-05-03 NOTE — PROGRESS NOTES
[facility]  SUBJECTIVE:    Chief Complaint: Left psoas abscess    Tarun Justice   1950   79 y.o. male presents for follow-up regarding recent admission for a left psoas abscess. 2-3 months ago the patient had a robot assisted laparoscopic prostatectomy for malignancy. The patient had increasing left hip and thigh pain for a month prior to admission and then was found to have a large left psoas abscess. This was percutaneously drained on March 31, 2019 and had large output over 200 mL per day for the next several days. He's been started on IV antibiotics which have been continued after discharge through a right arm PICC. He's been tolerating these well. His pain is well controlled. He is tolerating a diet and with normal bowel movements. He is voiding well. The drain output has now decreased to less than 5 mL per day and almost nothing over the last day. Recent imaging shows near complete resolution of the fluid.   The patient has no symptoms and presents primarily for drain follow-up and hopeful removal     Past Medical History:   Diagnosis Date    CAD (coronary artery disease)     Mixed hyperlipidemia 11/26/2018    Prostate cancer ([address] 1/21/2019     Past Surgical History:   Procedure Laterality Date    CARDIAC CATHETERIZATION  12/28/2015    Dr Jorge Menendez    COLONOSCOPY  1-10-11    CORONARY ARTERY BYPASS GRAFT  12.30.2015    [place]    DENTAL SURGERY      PROSTATECTOMY N/A 1/28/2019    ROBOTIC LAPAROSCOPIC RADICAL PROSTATECTOMY WITH BILATERAL LYMPH NODE DISSECTION AND RIGHT SIDE NERVE SPARING performed by Mara Salazar MD at [address] History     Socioeconomic History    Marital status:      Spouse name: Young Blanchard    Number of children: 2    Years of education: 15    Highest education level: Not on file   Occupational History    Not on file   Social Needs    Financial resource strain: Not on file    Food insecurity: Worry: Not on file     Inability: Not on file    Transportation needs:     Medical: Not on file     Non-medical: Not on file   Tobacco Use    Smoking status: Never Smoker    Smokeless tobacco: Never Used   Substance and Sexual Activity    Alcohol use:  Yes     Alcohol/week: 4.2 oz     Types: 7 Glasses of wine per week     Comment: per week    Drug use: No    Sexual activity: Not Currently   Lifestyle    Physical activity:     Days per week: Not on file     Minutes per session: Not on file    Stress: Not on file   Relationships    Social connections:     Talks on phone: Not on file     Gets together: Not on file     Attends [employer] service: Not on file     Active member of club or organization: Not on file     Attends meetings of clubs or organizations: Not on file     Relationship status: Not on file    Intimate partner violence:     Fear of current or ex partner: Not on file     Emotionally abused: Not on file     Physically abused: Not on file     Forced sexual activity: Not on file   Other Topics Concern    Not on file   Social History Narrative    Not on file      Family History   Problem Relation Age of Onset    Heart Disease Father     Stroke Mother     Other Sister      Current Outpatient Medications   Medication Sig Dispense Refill    Bacillus Coagulans-Inulin (PROBIOTIC FORMULA PO) Take by mouth      doxycycline hyclate (VIBRA-TABS) 100 MG tablet Take 1 tablet by mouth 2 times daily for 21 days 42 tablet 0    vancomycin (VANCOCIN) 250 MG capsule Take 1 capsule by mouth 4 times daily for 10 days 40 capsule 0    fluticasone (FLONASE) 50 MCG/ACT nasal spray 1 spray by Each Nare route daily 1 Bottle 3    atorvastatin (LIPITOR) 10 MG tablet Take 1 tablet by mouth nightly 90 tablet 3    senna-docusate (PERICOLACE) 8.6-50 MG per tablet Take 1 tablet by mouth 2 times daily 50 tablet 0    aspirin 81 MG tablet Take 1 tablet by mouth daily 90 tablet 3     No current facility-administered medications for this visit. Facility-Administered Medications Ordered in Other Visits   Medication Dose Route Frequency Provider Last Rate Last Dose    lidocaine 1 % (PF) injection 0.1-0.5 mL  0.1-0.5 mL Intradermal Once Theo Lynch MD          No Known Allergies     Review of Systems:  Review of systems performed and negative with the exception of the above findings    OBJECTIVE:  /80   Pulse 77   Wt 175 lb (79.4 kg)   BMI 26.61 kg/m²      Physical Exam:  General appearance: alert, appears stated age, cooperative and no distress  Abdomen: Soft, nondistended, nontender, left abdominal drain in place with minimal output, removed without incident    Hospital Outpatient Visit on 05/02/2019   Component Date Value Ref Range Status    PSA 05/02/2019 0.02  0.00 - 4.00 ng/mL Final   Hospital Outpatient Visit on 04/27/2019   Component Date Value Ref Range Status    C difficile Toxin, EIA 04/27/2019    Final                    Value: Indeterminate- see results of C. difficile   amplification test (PCR)      Normal Range: Negative      Organism 04/27/2019 C. difficile toxin B gene detected*  Final    CLOSTRIDIUM DIFFICILE DNA AMPLIFIC* 04/27/2019 *  Final                    Value:POSITIVE FOR  Normal Range: Not detected  [address] Outpatient Visit on 04/26/2019   Component Date Value Ref Range Status    WBC 04/26/2019 10.3  4.0 - 11.0 K/uL Final    RBC 04/26/2019 4.44  4.20 - 5.90 M/uL Final    Hemoglobin 04/26/2019 12.6* 13.5 - 17.5 g/dL Final    Hematocrit 04/26/2019 37.2* 40.5 - 52.5 % Final    MCV 04/26/2019 83.9  80.0 - 100.0 fL Final    MCH 04/26/2019 28.5  26.0 - 34.0 pg Final    MCHC 04/26/2019 34.0  31.0 - 36.0 g/dL Final    RDW 04/26/2019 14.1  12.4 - 15.4 % Final    Platelets 44/39/3858 268  135 - 450 K/uL Final    MPV 04/26/2019 7.4  5.0 - 10.5 fL Final    Neutrophils % 04/26/2019 70.9  % Final    Lymphocytes % 04/26/2019 16.3  % Final    Monocytes % 04/26/2019 9.7  % Final    Eosinophils % 04/26/2019 1.9  % Final    Basophils % 04/26/2019 1.2  % Final    Neutrophils # 04/26/2019 7.3  1.7 - 7.7 K/uL Final    Lymphocytes # 04/26/2019 1.7  1.0 - 5.1 K/uL Final    Monocytes # 04/26/2019 1.0  0.0 - 1.3 K/uL Final    Eosinophils # 04/26/2019 0.2  0.0 - 0.6 K/uL Final    Basophils # 04/26/2019 0.1  0.0 - 0.2 K/uL Final    Sodium 04/26/2019 137  136 - 145 mmol/L Final    Potassium 04/26/2019 4.6  3.5 - 5.1 mmol/L Final    Chloride 04/26/2019 105  99 - 110 mmol/L Final    CO2 04/26/2019 25  21 - 32 mmol/L Final    Anion Gap 04/26/2019 7  3 - 16 Final    Glucose 04/26/2019 102* 70 - 99 mg/dL Final    BUN 04/26/2019 16  7 - 20 mg/dL Final    CREATININE 04/26/2019 0.7* 0.8 - 1.3 mg/dL Final    GFR Non- 04/26/2019 >60  >60 Final    Comment: >60 mL/min/1.73m2 EGFR, calc. for ages 25 and older using the  MDRD formula (not corrected for weight), is valid for stable  renal function.  GFR  04/26/2019 >60  >60 Final    Comment: Chronic Kidney Disease: less than 60 ml/min/1.73 sq.m. Kidney Failure: less than 15 ml/min/1.73 sq.m. Results valid for patients 18 years and older.  Calcium 04/26/2019 9.2  8.3 - 10.6 mg/dL Final    Total Protein 04/26/2019 7.2  6.4 - 8.2 g/dL Final    Alb 04/26/2019 3.7  3.4 - 5.0 g/dL Final    Albumin/Globulin Ratio 04/26/2019 1.1  1.1 - 2.2 Final    Total Bilirubin 04/26/2019 0.3  0.0 - 1.0 mg/dL Final    Alkaline Phosphatase 04/26/2019 84  40 - 129 U/L Final    ALT 04/26/2019 13  10 - 40 U/L Final    AST 04/26/2019 22  15 - 37 U/L Final    Globulin 04/26/2019 3.5  g/dL Final    CRP 04/26/2019 5.3* 0.0 - 5.1 mg/L Final    Comment: WR-CRP Reference Range:  0D-29D      <0.6  30D-199Y    <5.1    CRP is used in the detection and evaluation of infection, tissue injury,  inflammatory disorders and associated diseases.  Increases in CRP values  are non-specific and should not be Final    Calcium 04/10/2019 8.7  mg/dL Final    Total Protein 04/10/2019 7.2   Final    CO2 04/10/2019 21  mmol/L Final    Alb 04/10/2019 2.9   Final    Alkaline Phosphatase 04/10/2019 136  U/L Final    Total Bilirubin 04/10/2019 0.5  0.1 - 1.4 mg/dL Final    CRP 04/10/2019 15.20   Final    WBC 04/10/2019 7.6  10^3/mL Final    Hemoglobin 04/10/2019 12.7* 13.5 - 17.5 g/dL Final    Hematocrit 04/10/2019 38.8* 41 - 53 % Final    Platelets 21/43/1980 727  K/µL Final    Lymphocytes % 04/10/2019 20  % Final    Monocytes % 04/10/2019 9  % Final    Eosinophils % 04/10/2019 2  % Final    Basophils % 04/10/2019 3  % Final    Neutrophils # 04/10/2019 4.99  /µL Final    Lymphocytes # 04/10/2019 1.51  /µL Final    Monocytes # 04/10/2019 0.71  /µL Final    Eosinophils # 04/10/2019 0.16  /µL Final    Basophils # 04/10/2019 0.21  /µL Final    RBC 04/10/2019 4.64  10^6/µL Final    MCV 04/10/2019 83.7  fL Final    MCH 04/10/2019 27.3  pg Final    MCHC 04/10/2019 32.7  g/dL Final    MPV 04/10/2019 8.0  fL Final    Sed Rate 04/10/2019 84   Final   Admission on 03/30/2019, Discharged on 04/07/2019   No results displayed because visit has over 200 results.       Orders Only on 03/29/2019   Component Date Value Ref Range Status    Glucose, UA POC 03/29/2019 100 mg   Final    Bilirubin, UA 03/29/2019 Moderate   Final    Ketones, UA 03/29/2019 Trace   Final    Spec Grav, UA 03/29/2019 1.025   Final    Blood, UA POC 03/29/2019 large   Final    pH, UA 03/29/2019 5.5   Final    Protein, UA POC 03/29/2019 >300 mg   Final    Urobilinogen, UA 03/29/2019 >8.0 EU   Final    Leukocytes, UA 03/29/2019 Trace   Final    Nitrite, UA 03/29/2019 Neg   Final   Orders Only on 03/29/2019   Component Date Value Ref Range Status    WBC 03/29/2019 29.3* 4.0 - 11.0 K/uL Final    RBC 03/29/2019 4.53  4.20 - 5.90 M/uL Final    Hemoglobin 03/29/2019 12.6* 13.5 - 17.5 g/dL Final    Hematocrit 03/29/2019 38.6* 40.5 - 52.5 % Final    MCV 03/29/2019 85.3  80.0 - 100.0 fL Final    MCH 03/29/2019 27.9  26.0 - 34.0 pg Final    MCHC 03/29/2019 32.7  31.0 - 36.0 g/dL Final    RDW 03/29/2019 13.9  12.4 - 15.4 % Final    Platelets 86/08/1538 268  135 - 450 K/uL Final    MPV 03/29/2019 9.7  5.0 - 10.5 fL Final    SLIDE REVIEW 03/29/2019 see below   Final    Slide review agrees with reported results    Neutrophils % 03/29/2019 72.0  % Final    Lymphocytes % 03/29/2019 5.0  % Final    Monocytes % 03/29/2019 5.0  % Final    Eosinophils % 03/29/2019 0.0  % Final    Basophils % 03/29/2019 0.0  % Final    Neutrophils # 03/29/2019 26.4* 1.7 - 7.7 K/uL Final    Lymphocytes # 03/29/2019 1.5  1.0 - 5.1 K/uL Final    Monocytes # 03/29/2019 1.5* 0.0 - 1.3 K/uL Final    Eosinophils # 03/29/2019 0.0  0.0 - 0.6 K/uL Final    Basophils # 03/29/2019 0.0  0.0 - 0.2 K/uL Final    Bands Relative 03/29/2019 18* 0 - 7 % Final    nRBC 03/29/2019 1* /100 WBC Final    @L=100    Toxic Granulation 03/29/2019 Present*  Final   Ancillary Orders on 03/29/2019   Component Date Value Ref Range Status    Organism 03/29/2019 Klebsiella oxytoca*  Final    Urine Culture, Routine 03/29/2019 10,000 CFU/ml   Final       Ct Abdomen Pelvis W Iv Contrast Additional Contrast? Oral    Result Date: 5/2/2019  EXAMINATION: CT OF THE ABDOMEN AND PELVIS WITH CONTRAST 5/2/2019 1:23 pm TECHNIQUE: CT of the abdomen and pelvis was performed with the administration of intravenous contrast. Multiplanar reformatted images are provided for review. Dose modulation, iterative reconstruction, and/or weight based adjustment of the mA/kV was utilized to reduce the radiation dose to as low as reasonably achievable.  COMPARISON: 04/12/2019 CT HISTORY: ORDERING SYSTEM PROVIDED HISTORY: Psoas abscess [facility]) TECHNOLOGIST PROVIDED HISTORY: Additional Contrast?->Oral Reason for exam:->f/u on abdominal abscesses Ordering Physician Provided Reason for Exam: follow up \"gut\" infection Acuity: Acute Type of Exam: Initial Additional signs and symptoms: no new complaints FINDINGS: Lower Chest:  The lung bases are clear. The base of the heart is normal. Organs: Stable 5 mm cyst in the left hepatic lobe. The liver is otherwise normal.  The gallbladder, biliary ducts, pancreas and spleen are normal. Kidneys and adrenal glands are normal. GI/Bowel: The stomach, duodenum and small bowel are normal. Nonvisualized appendix with no inflammation at the cecum. The colon is normal. Pelvis: The bladder is unremarkable. The prostate is surgically absent. Peritoneum/Retroperitoneum: Prior history of abscess involving the left psoas and iliopsoas muscles. So asked component has completely resolved from prior imaging. Small residual fluid collection within the ileus psoas muscle measuring 3.0 x 0.7 cm. This has significantly improved. Percutaneous drainage catheter unchanged in position at the inferior margin of the iliopsoas muscle. Aorta tapers normally. Reactive retroperitoneal lymph nodes are slightly improved. Bones/Soft Tissues: No significant skeletal abnormalities. 1. Complete resolution of psoas abscess. 2. Near complete resolution of additional abscess component in the iliopsoas muscle. Drainage catheter unchanged in position. Ct Abdomen Pelvis W Iv Contrast Additional Contrast? Oral    Result Date: 4/12/2019  EXAMINATION: CT OF THE ABDOMEN AND PELVIS WITH CONTRAST 4/12/2019 12:02 pm TECHNIQUE: CT of the abdomen and pelvis was performed with the administration of intravenous contrast. Multiplanar reformatted images are provided for review. Dose modulation, iterative reconstruction, and/or weight based adjustment of the mA/kV was utilized to reduce the radiation dose to as low as reasonably achievable.  COMPARISON: 03/30/2019 HISTORY: ORDERING SYSTEM PROVIDED HISTORY: Psoas abscess [facility]) TECHNOLOGIST PROVIDED HISTORY: Additional Contrast?->Oral Ordering Physician Provided Reason for Exam: Psoas abscess Acuity: Unknown Type of Exam: Unknown FINDINGS: Lower Chest: Coronary artery calcification is seen. De pendant opacity is seen at the lung bases. There is subtle fusiform bronchiectasis seen at the lung bases. Organs: No perisplenic fluid. Right adrenal gland is normal.  There is mild thickening of left adrenal gland. There is lobular contour left kidney. No hydronephrosis. Hypodense nodules seen throughout the left kidney measuring 10 mm in size or less, likely cyst. There is lobular contour the right kidney. No hydronephrosis. Scattered circumscribed hypodense nodules seen throughout the right kidney measuring 6 mm in size or less. No intrahepatic ductal dilatation. No perihepatic fluid Hypodense nodule seen in the left hepatic lobe measuring 8-9 mm, similar to prior. No peripancreatic inflammatory change GI/Bowel: Moderate stool load seen in the colon. Scattered colonic diverticula are seen. No bowel obstruction Pelvis: Atrial drainage catheter is now seen on the left. There has been rapid decrease in size of the abscess involving the left psoas and iliopsoas muscle. A small collection remains in the left iliopsoas, measuring 1.8 cm x 3.9 cm. Small collection remains in the left psoas measuring 2.0 cm x 1.7 cm. Cranial caudal, collection measures approximately 14.0 cm. There is surrounding injection of the fat. Peritoneum/Retroperitoneum: Small periaortic nodes are seen on the left, similar to prior, likely reactive. Bones/Soft Tissues: Bones appear unchanged. Scattered degenerative changes are seen     Marked reduction of left psoas-iliopsoas abscess. Drain appears in good position.   Small reactive periaortic nodes are seen     Xr Chest Portable    Result Date: 4/5/2019  EXAMINATION: SINGLE XRAY VIEW OF THE CHEST 4/5/2019 2:32 pm COMPARISON: 01/15/2019 HISTORY: ORDERING SYSTEM PROVIDED HISTORY: shortness of breath TECHNOLOGIST PROVIDED HISTORY: Reason for exam:->shortness of breath Ordering Physician Provided Reason for Exam: shortness of breath Acuity: Unknown Type of Exam: Unknown FINDINGS: Right upper extremity PICC line is present. Status post median sternotomy. Prominence of pulmonary vascularity. No pleural effusion or pneumothorax. Cardiac and mediastinal silhouettes are similar to prior. Tortuosity of aorta. Pulmonary vascular congestion. ASSESSMENT:  Left psoas abscess, s/p percutaneous drainage 3/31/19  Recent robot prostatectomy  CAD, s/p CABG     PLAN:  1. PICC removed, antibiotics per infectious disease   2.   percutaneous drain removed, dry dressing as needed   3.   follow with urology as scheduled   4.  follow general surgery as needed    Dustin Marquez MD, FACS  5/3/2019  11:43 AM

## 2019-05-03 NOTE — PATIENT INSTRUCTIONS
1.  PICC removed, antibiotics per infectious disease   2.   percutaneous drain removed, dry dressing as needed   3.   follow with urology as scheduled   4.  follow general surgery as needed
No

## 2019-05-10 ENCOUNTER — OFFICE VISIT (OUTPATIENT)
Dept: FAMILY MEDICINE CLINIC | Age: 69
End: 2019-05-10
Payer: COMMERCIAL

## 2019-05-10 VITALS
HEIGHT: 68 IN | BODY MASS INDEX: 27.13 KG/M2 | SYSTOLIC BLOOD PRESSURE: 126 MMHG | OXYGEN SATURATION: 95 % | RESPIRATION RATE: 14 BRPM | DIASTOLIC BLOOD PRESSURE: 74 MMHG | HEART RATE: 56 BPM | TEMPERATURE: 98.2 F | WEIGHT: 179 LBS

## 2019-05-10 DIAGNOSIS — K68.12 PSOAS ABSCESS (HCC): Primary | ICD-10-CM

## 2019-05-10 DIAGNOSIS — Z00.00 ANNUAL PHYSICAL EXAM: Primary | ICD-10-CM

## 2019-05-10 PROCEDURE — 1036F TOBACCO NON-USER: CPT | Performed by: FAMILY MEDICINE

## 2019-05-10 PROCEDURE — G8427 DOCREV CUR MEDS BY ELIG CLIN: HCPCS | Performed by: FAMILY MEDICINE

## 2019-05-10 PROCEDURE — 3017F COLORECTAL CA SCREEN DOC REV: CPT | Performed by: FAMILY MEDICINE

## 2019-05-10 PROCEDURE — 1123F ACP DISCUSS/DSCN MKR DOCD: CPT | Performed by: FAMILY MEDICINE

## 2019-05-10 PROCEDURE — 4040F PNEUMOC VAC/ADMIN/RCVD: CPT | Performed by: FAMILY MEDICINE

## 2019-05-10 PROCEDURE — G8598 ASA/ANTIPLAT THER USED: HCPCS | Performed by: FAMILY MEDICINE

## 2019-05-10 PROCEDURE — 99213 OFFICE O/P EST LOW 20 MIN: CPT | Performed by: FAMILY MEDICINE

## 2019-05-10 PROCEDURE — G8419 CALC BMI OUT NRM PARAM NOF/U: HCPCS | Performed by: FAMILY MEDICINE

## 2019-05-10 ASSESSMENT — ENCOUNTER SYMPTOMS: DIARRHEA: 0

## 2019-05-10 NOTE — PROGRESS NOTES
5/10/2019    This is a 79 y.o. male   Chief Complaint   Patient presents with    Follow-Up from Hospital     F/U   .    HPI  Pt presents today for F/U for a left sided psoas abscess. Was hospitalized from 03/30/19 to 04/06/19. States that today that he feels wonderful and that he is 99% recovered. Had diarrhea 7 days ago and called ID, was told to stop PICC line antibiotic and start new PO antibiotic (Doxycycline), diarrhea resolved, taking probiotics and drinking Oneita Diaz and taking probiotics. Denies psoas pain, still a little stiff in the upper left leg. Denies f/c. Past Medical History:   Diagnosis Date    CAD (coronary artery disease)     Mixed hyperlipidemia 11/26/2018    Prostate cancer ([facility] [address] 1/21/2019       Past Surgical History:   Procedure Laterality Date    CARDIAC CATHETERIZATION  12/28/2015    Dr Nicolasa Mckoy    COLONOSCOPY  1-10-11    [place]PASS [place]RA[place]  12[place]30.2015    [place]    DENTAL SURGERY      PROSTATECTOMY N/A 1/28/2019    ROBOTIC LAPAROSCOPIC RADICAL PROSTATECTOMY WITH BILATERAL LYMPH NODE DISSECTION AND RIGHT SIDE NERVE SPARING performed by Mirian Hicks MD at [address] [place] [address] History     Socioeconomic History    Marital status:      Spouse name: Aguilar Morales    Number of children: 2    Years of education: 15    Highest education level: Not on file   Occupational History    Not on file   Social Needs    Financial resource strain: Not on file    Food insecurity:     Worry: Not on file     Inability: Not on file    Transportation needs:     Medical: Not on file     Non-medical: Not on file   Tobacco Use    Smoking status: Never Smoker    Smokeless tobacco: Never Used   Substance and Sexual Activity    Alcohol use:  Yes     Alcohol/week: 4.2 oz     Types: 7 Glasses of wine per week     Comment: per week    Drug use: No    Sexual activity: Not Currently   Lifestyle    Physical activity:     Days per week: Not on file     Minutes per session: Not on file    diarrhea (resolved). Musculoskeletal: Negative for myalgias (psoas). Left LE stiffness       /74 (Site: Left Upper Arm, Position: Sitting, Cuff Size: Medium Adult)   Pulse 56   Temp 98.2 °F (36.8 °C) (Oral)   Resp 14   Ht 5' 8\" (1.727 m)   Wt 179 lb (81.2 kg)   SpO2 95%   BMI 27.22 kg/m²     Physical Exam   Constitutional: He is oriented to person, place, and time. He appears well-developed and well-nourished. HENT:   Head: Normocephalic and atraumatic. Eyes: Pupils are equal, round, and reactive to light. EOM are normal.   Neck: Normal range of motion. Cardiovascular: Normal rate, regular rhythm and normal heart sounds. Pulmonary/Chest: Effort normal and breath sounds normal.   Abdominal: Bowel sounds are normal. There is no tenderness. Musculoskeletal: He exhibits no tenderness (left psoas/inguinal). Neurological: He is alert and oriented to person, place, and time. Psychiatric: He has a normal mood and affect. His behavior is normal. Judgment and thought content normal.   Vitals reviewed. Plan   Diagnosis Orders   1. Psoas abscess ([address]  Currently doing well without sx, still taking Doxy       Return in about 4 months (around 9/10/2019) for Physical Exam.    Prior to Visit Medications    Medication Sig Taking?  Authorizing Provider   Bacillus Coagulans-Inulin (PROBIOTIC FORMULA PO) Take by mouth Yes Historical Provider, MD   doxycycline hyclate (VIBRA-TABS) 100 MG tablet Take 1 tablet by mouth 2 times daily for 21 days Yes Baldomero Garduno MD   atorvastatin (LIPITOR) 10 MG tablet Take 1 tablet by mouth nightly Yes Diego Perry MD   aspirin 81 MG tablet Take 1 tablet by mouth daily Yes Diego Perry MD   fluticasone (FLONASE) 50 MCG/ACT nasal spray 1 spray by Each Nare route daily  Brandee Blackwell MD   senna-docusate (PERICOLACE) 8.6-50 MG per tablet Take 1 tablet by mouth 2 times daily  Mica Nagel MD

## 2019-05-23 ENCOUNTER — TELEPHONE (OUTPATIENT)
Dept: FAMILY MEDICINE CLINIC | Age: 69
End: 2019-05-23

## 2019-05-23 DIAGNOSIS — Z00.00 ANNUAL PHYSICAL EXAM: Primary | ICD-10-CM

## 2019-05-23 NOTE — TELEPHONE ENCOUNTER
Please put labs in for be well within physical  Future Appointments   Date Time Provider Carol Rincon   6/13/2019 10:00 AM DO INOCENTE Mata  [facility]   7/11/2019  9:30 AM DO INOCENTE Mata  [address] [facility]   11/18/2019  9:30 AM MD Isidro Lara [facility]

## 2019-05-30 ENCOUNTER — TELEPHONE (OUTPATIENT)
Dept: INFECTIOUS DISEASES | Age: 69
End: 2019-05-30

## 2019-05-30 DIAGNOSIS — R19.7 DIARRHEA OF PRESUMED INFECTIOUS ORIGIN: Primary | ICD-10-CM

## 2019-06-10 ENCOUNTER — HOSPITAL ENCOUNTER (OUTPATIENT)
Age: 69
Discharge: HOME | End: 2019-06-10
Payer: COMMERCIAL

## 2019-06-10 LAB — C DIFF TOXIN/ANTIGEN: NORMAL

## 2019-06-10 PROCEDURE — 87449 NOS EACH ORGANISM AG IA: CPT

## 2019-06-10 PROCEDURE — 87324 CLOSTRIDIUM AG IA: CPT

## 2019-06-12 ENCOUNTER — TELEPHONE (OUTPATIENT)
Dept: INFECTIOUS DISEASES | Age: 69
End: 2019-06-12

## 2019-06-12 NOTE — TELEPHONE ENCOUNTER
Pt called requesting results of stool culture, notified culture was negative for c-diff and other toxins. Advised pt that is doctor would like any further follow up that we would call him back, pt verbalized understanding.

## 2019-06-13 ENCOUNTER — OFFICE VISIT (OUTPATIENT)
Dept: FAMILY MEDICINE CLINIC | Age: 69
End: 2019-06-13
Payer: COMMERCIAL

## 2019-06-13 VITALS
OXYGEN SATURATION: 96 % | RESPIRATION RATE: 14 BRPM | SYSTOLIC BLOOD PRESSURE: 114 MMHG | BODY MASS INDEX: 26.83 KG/M2 | HEIGHT: 68 IN | DIASTOLIC BLOOD PRESSURE: 74 MMHG | HEART RATE: 56 BPM | WEIGHT: 177 LBS

## 2019-06-13 DIAGNOSIS — R05.3 CHRONIC COUGH: ICD-10-CM

## 2019-06-13 DIAGNOSIS — Z00.00 ANNUAL PHYSICAL EXAM: Primary | ICD-10-CM

## 2019-06-13 DIAGNOSIS — R71.0 DECREASED HEMOGLOBIN: ICD-10-CM

## 2019-06-13 PROCEDURE — 90471 IMMUNIZATION ADMIN: CPT | Performed by: FAMILY MEDICINE

## 2019-06-13 PROCEDURE — 99397 PER PM REEVAL EST PAT 65+ YR: CPT | Performed by: FAMILY MEDICINE

## 2019-06-13 PROCEDURE — 90715 TDAP VACCINE 7 YRS/> IM: CPT | Performed by: FAMILY MEDICINE

## 2019-06-13 RX ORDER — SILDENAFIL CITRATE 20 MG/1
20 TABLET ORAL PRN
COMMUNITY
End: 2019-12-19 | Stop reason: CLARIF

## 2019-06-13 ASSESSMENT — ENCOUNTER SYMPTOMS
EYE PAIN: 0
EYE ITCHING: 0
COUGH: 1
ABDOMINAL PAIN: 0
COLOR CHANGE: 0
VOICE CHANGE: 0
BACK PAIN: 0

## 2019-06-13 NOTE — PROGRESS NOTES
Gilda Raymond  YOB: 1950    Date of Service:  6/13/2019    Chief Complaint:   Gilda Raymond is a 79 y.o. male who presents for complete physical examination ([facility]).     HPI:  HPI    Self-testicular exams: Yes  Sexual activity: has sex with males   Last eye exam: 5-6 years ago,normal  Exercise: cycles and walks, also does elliptical  Seatbelt use: yes  Are You a Spiritual person: yes  Living will: has had discussion with his wife    Wt Readings from Last 3 Encounters:   06/13/19 177 lb (80.3 kg)   05/10/19 179 lb (81.2 kg)   05/03/19 175 lb (79.4 kg)     BP Readings from Last 3 Encounters:   06/13/19 114/74   05/10/19 126/74   05/03/19 110/80       Patient Active Problem List   Diagnosis    Chest pain on exertion    Exertional chest pain    ACS (acute coronary syndrome) (HCC)    Abnormal stress test    Coronary artery disease involving native coronary artery of native heart without angina pectoris    S/P CABG x 5    Elevated PSA    Mixed hyperlipidemia    Prostate cancer ([address]    Psoas abscess (HCC)    Leukocytosis    Fever    Sepsis due to methicillin susceptible Staphylococcus aureus ([address]    History of prostate cancer    Status post prostatectomy    Coronary artery disease due to lipid rich plaque    Overweight    Complicated UTI (urinary tract infection)    Receiving intravenous antibiotic treatment as outpatient    Weight loss counseling, encounter for       Health Maintenance   Topic Date Due    DTaP/Tdap/Td vaccine (1 - Tdap) 11/12/1971    Diabetes screen  12/30/2018    Pneumococcal 65+ years Vaccine (1 of 2 - PCV13) 06/10/2020 (Originally 11/12/2017)    Shingles Vaccine (1 of 2) 06/13/2020 (Originally 11/12/2002)    Flu vaccine (Season Ended) 09/01/2019    Colon cancer screen colonoscopy  01/11/2021    Lipid screen  03/08/2023    Hepatitis C screen  Completed       There is no immunization history for the selected administration types on file for this patient. No Known Allergies  Outpatient Medications Marked as Taking for the 6/13/19 encounter (Office Visit) with John Fournier, DO   Medication Sig Dispense Refill    sildenafil (REVATIO) 20 MG tablet Take 20 mg by mouth as needed      Bacillus Coagulans-Inulin (PROBIOTIC FORMULA PO) Take by mouth      atorvastatin (LIPITOR) 10 MG tablet Take 1 tablet by mouth nightly 90 tablet 3    aspirin 81 MG tablet Take 1 tablet by mouth daily 90 tablet 3       Past Medical History:   Diagnosis Date    CAD (coronary artery disease)     Mixed hyperlipidemia 11/26/2018    Prostate cancer ([address] 1/21/2019     Past Surgical History:   Procedure Laterality Date    CARDIAC CATHETERIZATION  12/28/2015    Dr Patel Logan    COLONOSCOPY  1-10-11    CORONARY ARTERY [place]PASS [place]RAFT  12[place]30.2015    [place]    DENTAL SURGERY      PROSTATECTOMY N/A 1/28/2019    ROBOTIC LAPAROSCOPIC RADICAL PROSTATECTOMY WITH BILATERAL LYMPH NODE DISSECTION AND RIGHT SIDE NERVE SPARING performed by Tiara Pelayo MD at [address] [place] [address],[address] [facility] History   Problem Relation Age of Onset    Heart Disease Father     Stroke Mother     Other Sister      Social History     Socioeconomic History    Marital status:      Spouse name: Jess Grant    Number of children: 2    Years of education: 15    Highest education level: Not on file   Occupational History    Not on file   Social Needs    Financial resource strain: Not on file    Food insecurity:     Worry: Not on file     Inability: Not on file    Transportation needs:     Medical: Not on file     Non-medical: Not on file   Tobacco Use    Smoking status: Never Smoker    Smokeless tobacco: Never Used   Substance and Sexual Activity    Alcohol use:  Yes     Alcohol/week: 4.2 oz     Types: 7 Glasses of wine per week     Comment: per week    Drug use: No    Sexual activity: Not Currently   Lifestyle    Physical activity:     Days per week: Not on file     Minutes per session: Not on file    Stress: Not on file   Relationships    Social connections:     Talks on phone: Not on file     Gets together: Not on file     Attends Shinto service: Not on file     Active member of club or organization: Not on file     Attends meetings of clubs or organizations: Not on file     Relationship status: Not on file    Intimate partner violence:     Fear of current or ex partner: Not on file     Emotionally abused: Not on file     Physically abused: Not on file     Forced sexual activity: Not on file   Other Topics Concern    Not on file   Social History Narrative    Not on file       Review ofSystems:  Review of Systems   Constitutional: Negative for fatigue. HENT: Negative for congestion, tinnitus and voice change. Eyes: Negative for pain and itching. Respiratory: Positive for cough (when coughing). Cardiovascular: Negative for chest pain and palpitations. Gastrointestinal: Negative for abdominal pain. Endocrine: Negative for cold intolerance and heat intolerance. Genitourinary: Negative for difficulty urinating and hematuria. Musculoskeletal: Positive for myalgias (mild). Negative for back pain and neck pain. Skin: Negative for color change and rash. Allergic/Immunologic: Positive for environmental allergies (possibly spring). Neurological: Negative for dizziness and headaches. Hematological: Does not bruise/bleed easily. Psychiatric/Behavioral: Negative for dysphoric mood. The patient is not nervous/anxious. PhysicalExam:   Vitals:    06/13/19 1021   BP: 114/74   Site: Right Upper Arm   Pulse: 56   Resp: 14   SpO2: 96%   Weight: 177 lb (80.3 kg)   Height: 5' 8\" (1.727 m)     Body mass index is 26.91 kg/m². Physical Exam   Constitutional: He is oriented to person, place, and time. He appears well-developed and well-nourished. HENT:   Head: Normocephalic and atraumatic. Eyes: Pupils are equal, round, and reactive to light. EOM are normal.   Neck: Normal range of motion. Cardiovascular: Normal rate, regular rhythm and normal heart sounds. Pulmonary/Chest: Effort normal and breath sounds normal. He has no wheezes. Abdominal: Bowel sounds are normal. There is no tenderness. Musculoskeletal:   Bilateral UE/LE normal ROM   Neurological: He is alert and oriented to person, place, and time. He displays normal reflexes. No cranial nerve deficit or sensory deficit. He exhibits normal muscle tone. Coordination normal.   Psychiatric: He has a normal mood and affect. His behavior is normal. Judgment and thought content normal.   Vitals reviewed. Assessment/Plan:   Diagnosis Orders   1. Annual physical exam     2. Decreased hemoglobin     3. Chronic cough  Kandice Petit MD, Pulmonary, [facility]     Return in about 1 year (around 6/13/2020) for Physical Exam.    Prior to Visit Medications    Medication Sig Taking?  Authorizing Provider   sildenafil (REVATIO) 20 MG tablet Take 20 mg by mouth as needed Yes Historical Provider, MD   Bacillus Coagulans-Inulin (PROBIOTIC FORMULA PO) Take by mouth Yes Historical Provider, MD   atorvastatin (LIPITOR) 10 MG tablet Take 1 tablet by mouth nightly Yes Prince Rodriguez MD   aspirin 81 MG tablet Take 1 tablet by mouth daily Yes Prince Rodriguez MD   fluticasone (FLONASE) 50 MCG/ACT nasal spray 1 spray by Each Nare route daily  Mary Hernández MD

## 2019-06-14 DIAGNOSIS — Z00.00 ANNUAL PHYSICAL EXAM: ICD-10-CM

## 2019-06-14 LAB
A/G RATIO: 1.3 (ref 1.1–2.2)
ALBUMIN SERPL-MCNC: 4.1 G/DL (ref 3.4–5)
ALP BLD-CCNC: 69 U/L (ref 40–129)
ALT SERPL-CCNC: 23 U/L (ref 10–40)
ANION GAP SERPL CALCULATED.3IONS-SCNC: 14 MMOL/L (ref 3–16)
AST SERPL-CCNC: 26 U/L (ref 15–37)
BASOPHILS ABSOLUTE: 0.1 K/UL (ref 0–0.2)
BASOPHILS RELATIVE PERCENT: 0.9 %
BILIRUB SERPL-MCNC: 0.5 MG/DL (ref 0–1)
BUN BLDV-MCNC: 16 MG/DL (ref 7–20)
CALCIUM SERPL-MCNC: 10 MG/DL (ref 8.3–10.6)
CHLORIDE BLD-SCNC: 106 MMOL/L (ref 99–110)
CHOLESTEROL, TOTAL: 161 MG/DL (ref 0–199)
CO2: 24 MMOL/L (ref 21–32)
CREAT SERPL-MCNC: 0.8 MG/DL (ref 0.8–1.3)
EOSINOPHILS ABSOLUTE: 0.2 K/UL (ref 0–0.6)
EOSINOPHILS RELATIVE PERCENT: 2.5 %
GFR AFRICAN AMERICAN: >60
GFR NON-AFRICAN AMERICAN: >60
GLOBULIN: 3.2 G/DL
GLUCOSE BLD-MCNC: 99 MG/DL (ref 70–99)
HCT VFR BLD CALC: 44.1 % (ref 40.5–52.5)
HDLC SERPL-MCNC: 49 MG/DL (ref 40–60)
HEMOGLOBIN: 14.6 G/DL (ref 13.5–17.5)
LDL CHOLESTEROL CALCULATED: 95 MG/DL
LYMPHOCYTES ABSOLUTE: 1.8 K/UL (ref 1–5.1)
LYMPHOCYTES RELATIVE PERCENT: 27 %
MAGNESIUM: 2.2 MG/DL (ref 1.8–2.4)
MCH RBC QN AUTO: 27.9 PG (ref 26–34)
MCHC RBC AUTO-ENTMCNC: 33.1 G/DL (ref 31–36)
MCV RBC AUTO: 84.2 FL (ref 80–100)
MONOCYTES ABSOLUTE: 0.5 K/UL (ref 0–1.3)
MONOCYTES RELATIVE PERCENT: 7.8 %
NEUTROPHILS ABSOLUTE: 4 K/UL (ref 1.7–7.7)
NEUTROPHILS RELATIVE PERCENT: 61.8 %
PDW BLD-RTO: 15 % (ref 12.4–15.4)
PLATELET # BLD: 235 K/UL (ref 135–450)
PMV BLD AUTO: 8.7 FL (ref 5–10.5)
POTASSIUM SERPL-SCNC: 4.9 MMOL/L (ref 3.5–5.1)
RBC # BLD: 5.23 M/UL (ref 4.2–5.9)
SODIUM BLD-SCNC: 144 MMOL/L (ref 136–145)
T3 FREE: 3.6 PG/ML (ref 2.3–4.2)
T4 FREE: 1.1 NG/DL (ref 0.9–1.8)
TOTAL PROTEIN: 7.3 G/DL (ref 6.4–8.2)
TRIGL SERPL-MCNC: 83 MG/DL (ref 0–150)
TSH SERPL DL<=0.05 MIU/L-ACNC: 1.48 UIU/ML (ref 0.27–4.2)
VITAMIN D 25-HYDROXY: 38 NG/ML
VLDLC SERPL CALC-MCNC: 17 MG/DL
WBC # BLD: 6.5 K/UL (ref 4–11)

## 2019-09-05 ENCOUNTER — TELEPHONE (OUTPATIENT)
Dept: PULMONOLOGY | Age: 69
End: 2019-09-05

## 2019-11-14 ENCOUNTER — OFFICE VISIT (OUTPATIENT)
Dept: PULMONOLOGY | Age: 69
End: 2019-11-14
Payer: COMMERCIAL

## 2019-11-14 ENCOUNTER — HOSPITAL ENCOUNTER (OUTPATIENT)
Age: 69
Discharge: HOME | End: 2019-11-14
Payer: COMMERCIAL

## 2019-11-14 ENCOUNTER — TELEPHONE (OUTPATIENT)
Dept: CARDIOLOGY CLINIC | Age: 69
End: 2019-11-14

## 2019-11-14 ENCOUNTER — HOSPITAL ENCOUNTER (OUTPATIENT)
Dept: GENERAL RADIOLOGY | Age: 69
Discharge: HOME | End: 2019-11-14
Payer: COMMERCIAL

## 2019-11-14 VITALS
DIASTOLIC BLOOD PRESSURE: 72 MMHG | BODY MASS INDEX: 28.04 KG/M2 | HEART RATE: 50 BPM | WEIGHT: 185 LBS | SYSTOLIC BLOOD PRESSURE: 128 MMHG | HEIGHT: 68 IN | RESPIRATION RATE: 16 BRPM | TEMPERATURE: 98.1 F | OXYGEN SATURATION: 98 %

## 2019-11-14 DIAGNOSIS — R05.9 COUGH: ICD-10-CM

## 2019-11-14 DIAGNOSIS — J31.0 CHRONIC RHINITIS: ICD-10-CM

## 2019-11-14 DIAGNOSIS — R05.9 COUGH: Primary | ICD-10-CM

## 2019-11-14 PROCEDURE — 1036F TOBACCO NON-USER: CPT | Performed by: INTERNAL MEDICINE

## 2019-11-14 PROCEDURE — 71046 X-RAY EXAM CHEST 2 VIEWS: CPT

## 2019-11-14 PROCEDURE — 1123F ACP DISCUSS/DSCN MKR DOCD: CPT | Performed by: INTERNAL MEDICINE

## 2019-11-14 PROCEDURE — 3017F COLORECTAL CA SCREEN DOC REV: CPT | Performed by: INTERNAL MEDICINE

## 2019-11-14 PROCEDURE — 4040F PNEUMOC VAC/ADMIN/RCVD: CPT | Performed by: INTERNAL MEDICINE

## 2019-11-14 PROCEDURE — G8484 FLU IMMUNIZE NO ADMIN: HCPCS | Performed by: INTERNAL MEDICINE

## 2019-11-14 PROCEDURE — 99204 OFFICE O/P NEW MOD 45 MIN: CPT | Performed by: INTERNAL MEDICINE

## 2019-11-14 PROCEDURE — G8598 ASA/ANTIPLAT THER USED: HCPCS | Performed by: INTERNAL MEDICINE

## 2019-11-14 PROCEDURE — G8427 DOCREV CUR MEDS BY ELIG CLIN: HCPCS | Performed by: INTERNAL MEDICINE

## 2019-11-14 PROCEDURE — G8417 CALC BMI ABV UP PARAM F/U: HCPCS | Performed by: INTERNAL MEDICINE

## 2019-11-14 RX ORDER — FLUTICASONE PROPIONATE 50 MCG
2 SPRAY, SUSPENSION (ML) NASAL DAILY
Qty: 1 BOTTLE | Refills: 5 | Status: SHIPPED | OUTPATIENT
Start: 2019-11-14 | End: 2021-02-03 | Stop reason: ALTCHOICE

## 2019-11-14 RX ORDER — LORATADINE 10 MG/1
10 TABLET ORAL DAILY
Qty: 30 TABLET | Refills: 5 | Status: CANCELLED | OUTPATIENT
Start: 2019-11-14

## 2019-11-14 RX ORDER — LORATADINE 10 MG/1
10 TABLET ORAL DAILY
Qty: 30 TABLET | Refills: 5 | Status: SHIPPED | OUTPATIENT
Start: 2019-11-14 | End: 2021-08-23

## 2019-11-14 RX ORDER — BENZONATATE 200 MG/1
200 CAPSULE ORAL 3 TIMES DAILY PRN
Qty: 90 CAPSULE | Refills: 1 | Status: SHIPPED | OUTPATIENT
Start: 2019-11-14 | End: 2021-02-03

## 2019-11-29 ENCOUNTER — HOSPITAL ENCOUNTER (OUTPATIENT)
Dept: PULMONOLOGY | Age: 69
Discharge: HOME | End: 2019-11-29
Payer: COMMERCIAL

## 2019-11-29 VITALS — OXYGEN SATURATION: 100 %

## 2019-11-29 DIAGNOSIS — R05.9 COUGH: ICD-10-CM

## 2019-11-29 PROCEDURE — 94760 N-INVAS EAR/PLS OXIMETRY 1: CPT

## 2019-11-29 PROCEDURE — 94060 EVALUATION OF WHEEZING: CPT

## 2019-11-29 PROCEDURE — 94070 EVALUATION OF WHEEZING: CPT

## 2019-11-29 PROCEDURE — 94729 DIFFUSING CAPACITY: CPT

## 2019-11-29 PROCEDURE — 6360000002 HC RX W HCPCS: Performed by: INTERNAL MEDICINE

## 2019-11-29 PROCEDURE — 94726 PLETHYSMOGRAPHY LUNG VOLUMES: CPT

## 2019-11-29 PROCEDURE — 94640 AIRWAY INHALATION TREATMENT: CPT

## 2019-11-29 RX ORDER — ALBUTEROL SULFATE 2.5 MG/3ML
2.5 SOLUTION RESPIRATORY (INHALATION) ONCE
Status: COMPLETED | OUTPATIENT
Start: 2019-11-29 | End: 2019-11-29

## 2019-11-29 RX ADMIN — METHACHOLINE CHLORIDE 100 MG: 100 POWDER, FOR SOLUTION RESPIRATORY (INHALATION) at 09:28

## 2019-11-29 RX ADMIN — ALBUTEROL SULFATE 2.5 MG: 2.5 SOLUTION RESPIRATORY (INHALATION) at 09:28

## 2019-12-05 ENCOUNTER — OFFICE VISIT (OUTPATIENT)
Dept: PULMONOLOGY | Age: 69
End: 2019-12-05
Payer: COMMERCIAL

## 2019-12-05 VITALS
DIASTOLIC BLOOD PRESSURE: 68 MMHG | TEMPERATURE: 97.9 F | HEIGHT: 68 IN | SYSTOLIC BLOOD PRESSURE: 128 MMHG | WEIGHT: 188 LBS | BODY MASS INDEX: 28.49 KG/M2 | RESPIRATION RATE: 16 BRPM | OXYGEN SATURATION: 98 % | HEART RATE: 57 BPM

## 2019-12-05 DIAGNOSIS — R05.9 COUGH: Primary | ICD-10-CM

## 2019-12-05 DIAGNOSIS — J31.0 CHRONIC RHINITIS: ICD-10-CM

## 2019-12-05 PROCEDURE — 4040F PNEUMOC VAC/ADMIN/RCVD: CPT | Performed by: INTERNAL MEDICINE

## 2019-12-05 PROCEDURE — 1036F TOBACCO NON-USER: CPT | Performed by: INTERNAL MEDICINE

## 2019-12-05 PROCEDURE — 1123F ACP DISCUSS/DSCN MKR DOCD: CPT | Performed by: INTERNAL MEDICINE

## 2019-12-05 PROCEDURE — G8598 ASA/ANTIPLAT THER USED: HCPCS | Performed by: INTERNAL MEDICINE

## 2019-12-05 PROCEDURE — 3017F COLORECTAL CA SCREEN DOC REV: CPT | Performed by: INTERNAL MEDICINE

## 2019-12-05 PROCEDURE — G8484 FLU IMMUNIZE NO ADMIN: HCPCS | Performed by: INTERNAL MEDICINE

## 2019-12-05 PROCEDURE — G8427 DOCREV CUR MEDS BY ELIG CLIN: HCPCS | Performed by: INTERNAL MEDICINE

## 2019-12-05 PROCEDURE — 99213 OFFICE O/P EST LOW 20 MIN: CPT | Performed by: INTERNAL MEDICINE

## 2019-12-05 PROCEDURE — G8417 CALC BMI ABV UP PARAM F/U: HCPCS | Performed by: INTERNAL MEDICINE

## 2019-12-19 ENCOUNTER — TELEPHONE (OUTPATIENT)
Dept: CARDIOLOGY CLINIC | Age: 69
End: 2019-12-19

## 2019-12-19 ENCOUNTER — OFFICE VISIT (OUTPATIENT)
Dept: CARDIOLOGY CLINIC | Age: 69
End: 2019-12-19
Payer: COMMERCIAL

## 2019-12-19 VITALS
HEART RATE: 48 BPM | DIASTOLIC BLOOD PRESSURE: 82 MMHG | BODY MASS INDEX: 28.49 KG/M2 | HEIGHT: 68 IN | RESPIRATION RATE: 16 BRPM | WEIGHT: 188 LBS | SYSTOLIC BLOOD PRESSURE: 128 MMHG | OXYGEN SATURATION: 98 %

## 2019-12-19 DIAGNOSIS — I25.10 CORONARY ARTERY DISEASE INVOLVING NATIVE CORONARY ARTERY OF NATIVE HEART WITHOUT ANGINA PECTORIS: Primary | ICD-10-CM

## 2019-12-19 DIAGNOSIS — Z95.1 S/P CABG X 5: ICD-10-CM

## 2019-12-19 DIAGNOSIS — E78.2 MIXED HYPERLIPIDEMIA: ICD-10-CM

## 2019-12-19 PROCEDURE — 1123F ACP DISCUSS/DSCN MKR DOCD: CPT | Performed by: INTERNAL MEDICINE

## 2019-12-19 PROCEDURE — G8427 DOCREV CUR MEDS BY ELIG CLIN: HCPCS | Performed by: INTERNAL MEDICINE

## 2019-12-19 PROCEDURE — G8598 ASA/ANTIPLAT THER USED: HCPCS | Performed by: INTERNAL MEDICINE

## 2019-12-19 PROCEDURE — G8484 FLU IMMUNIZE NO ADMIN: HCPCS | Performed by: INTERNAL MEDICINE

## 2019-12-19 PROCEDURE — 3017F COLORECTAL CA SCREEN DOC REV: CPT | Performed by: INTERNAL MEDICINE

## 2019-12-19 PROCEDURE — G8417 CALC BMI ABV UP PARAM F/U: HCPCS | Performed by: INTERNAL MEDICINE

## 2019-12-19 PROCEDURE — 1036F TOBACCO NON-USER: CPT | Performed by: INTERNAL MEDICINE

## 2019-12-19 PROCEDURE — 4040F PNEUMOC VAC/ADMIN/RCVD: CPT | Performed by: INTERNAL MEDICINE

## 2019-12-19 PROCEDURE — 99214 OFFICE O/P EST MOD 30 MIN: CPT | Performed by: INTERNAL MEDICINE

## 2019-12-19 RX ORDER — TADALAFIL 10 MG/1
10 TABLET ORAL PRN
Qty: 30 TABLET | Refills: 5 | Status: SHIPPED | OUTPATIENT
Start: 2019-12-19

## 2020-08-13 ENCOUNTER — OFFICE VISIT (OUTPATIENT)
Dept: ORTHOPEDIC SURGERY | Age: 70
End: 2020-08-13
Payer: COMMERCIAL

## 2020-08-13 VITALS — TEMPERATURE: 98.5 F | BODY MASS INDEX: 28.49 KG/M2 | HEIGHT: 68 IN | WEIGHT: 188 LBS

## 2020-08-13 PROCEDURE — 1036F TOBACCO NON-USER: CPT | Performed by: ORTHOPAEDIC SURGERY

## 2020-08-13 PROCEDURE — 99204 OFFICE O/P NEW MOD 45 MIN: CPT | Performed by: ORTHOPAEDIC SURGERY

## 2020-08-13 PROCEDURE — 1123F ACP DISCUSS/DSCN MKR DOCD: CPT | Performed by: ORTHOPAEDIC SURGERY

## 2020-08-13 PROCEDURE — 3017F COLORECTAL CA SCREEN DOC REV: CPT | Performed by: ORTHOPAEDIC SURGERY

## 2020-08-13 PROCEDURE — 4040F PNEUMOC VAC/ADMIN/RCVD: CPT | Performed by: ORTHOPAEDIC SURGERY

## 2020-08-13 PROCEDURE — G8427 DOCREV CUR MEDS BY ELIG CLIN: HCPCS | Performed by: ORTHOPAEDIC SURGERY

## 2020-08-13 PROCEDURE — G8417 CALC BMI ABV UP PARAM F/U: HCPCS | Performed by: ORTHOPAEDIC SURGERY

## 2020-08-13 NOTE — PROGRESS NOTES
Present Illness:  Nimesh Coats is a 81 y.o. male being seen for the first time for severe right shoulder pain    Chief complaint presents in the office today: Right shoulder pain    Location right shoulder  Severity  Moderate  Duration started a week ago when he was raising his arm  Associated sign/symptoms sharp pain with raising the arm or pulling the arm back or pulling himself out of a seated position    I have reviewed and discussed the below Pain assessment findings with the patient. Pain Assessment  Location of Pain: Shoulder  Location Modifiers: Right  Severity of Pain: 0  Quality of Pain: Sharp  Duration of Pain: Persistent  Frequency of Pain: Intermittent  Aggravating Factors: Bending, Straightening  Limiting Behavior: Some  Relieving Factors: Nsaids  Result of Injury: No  Work-Related Injury: No  Are there other pain locations you wish to document?: No    Medical History  Patient's medications, allergies, past medical, surgical, social and family histories were reviewed and updated as appropriate. Past Medical History:   Diagnosis Date    CAD (coronary artery disease)     Mixed hyperlipidemia 11/26/2018    Prostate cancer ([facility] 1/21/2019     Family History   Problem Relation Age of Onset    Heart Disease Father     Stroke Mother     Other Sister      Social History     Socioeconomic History    Marital status:      Spouse name: Maura Heck    Number of children: 2    Years of education: 15    Highest education level: None   Occupational History    None   Social Needs    Financial resource strain: None    Food insecurity     Worry: None     Inability: None    Transportation needs     Medical: None     Non-medical: None   Tobacco Use    Smoking status: Never Smoker    Smokeless tobacco: Never Used   Substance and Sexual Activity    Alcohol use:  Yes     Alcohol/week: 7.0 standard drinks     Types: 7 Glasses of wine per week     Comment: per week    Drug use: No    Sexual available in the patient's chart under the Media tab. Examination:    General Exam:    Vitals: Temperature 98.5 °F (36.9 °C), height 5' 8\" (1.727 m), weight 188 lb (85.3 kg). BMI Readings from Last 3 Encounters:   08/13/20 28.59 kg/m²   12/19/19 28.59 kg/m²   12/05/19 28.59 kg/m²     Constitutional: Patient is adequately groomed with no evidence of malnutrition  Mental Status: The patient is oriented to time, place and person. The patient's mood and affect are appropriate. Lymphatic: The lymphatic examination bilaterally reveals all areas to be without enlargement or induration. Vascular: Examination reveals no swelling or calf tenderness. Peripheral pulses are palpable and 2+. Neurological: The patient has good coordination. There is no weakness or sensory deficit. Skin:    Head/Neck: inspection reveals no rashes, ulcerations or lesions. Trunk: inspection reveals no rashes, ulcerations or lesions. Right Upper Extremity: inspection reveals no rashes, ulcerations or lesions. Left Upper Extremity: inspection reveals no rashes, ulcerations or lesions. PHYSICAL EXAM:      Shoulder Examination  Inspection:  No obvious deformity, no erythema, no abrasions or lacerations, no obvious muscle atrophy.       Palpation:  Lateral deltoid moderate pain to palpation  AC joint mild pain to palpation  Mild pain Anterior to palpation  Mild pain Posterior to palpation  Moderate trapezial pain to palpation  Range of Motion:  Abduction --130 degrees  Flexion-- 150 degrees  Extension-- between 35-40 degrees  Latera/external rotation --close to 50 degrees  Medial/ internal rotation -- between 50-60 degrees    Strength: Right shoulder strength:   internal rotation against resistance is 4/5  external rotation against resistance is 4/5  and supraspinatus isolation against resistance is 3/5, Shoulder shrug is 5 over 5 , cervical spine strength is excellent, flexion extension at the elbow is 5 over 5, wrist and hand strength is equal bilaterally with supination pronation and flexion and extension  no winging no muscle atrophy. Special Tests:  Palpation demonstrates no swelling no effusion moderate pain. There is limited active and passive range of motion. Strength is intact but painful slightly weak with internal rotation against resistance external rotation against resistance supraspinatus isolation against resistance. Shoulder shrug strength is 5 over 5 equal bilaterally. Radial ulnar and median nerve function is intact. Capillary refill is brisk. Sensation is intact from neck down to the fingers. Elbow motion finger and wrist motion is full equal bilaterally. Deep tendon reflexes of the Brachial radialis, biceps, triceps are all +2/4 equal bilaterally. Cervical spine range of motion is full without pain negative Spurling's test.  Load-and-shift test is negative. Crank test is negative. Apprehension and relocation are negative. Anterior and posterior glide are equal bilaterally. Negative sulcus sign. No signs of any significant multidirectional instability. There is no scapular winging. There is no muscle atrophy of the latissimus dorsi, the deltoid, the periscapular musculature, the trapezius musculature or the pectoralis musculature. Positive Neer's test, positive Lui test, positive pain with crossarm elevation. Gait: normal gait     Reflex: Deep tendon reflexes of the biceps, triceps, brachioradialis +2/4 equal bilaterally. Lower extremity reflexes:  +2/4 and equal bilaterally for patella and Achilles. Contralateral Shoulder exam: Palpation demonstrates no swelling no effusion no pain. There is full active and passive range of motion bilaterally. Strength is excellent with internal rotation against resistance external rotation against resistance supraspinatus isolation against resistance. Shoulder shrug strength is 5 over 5 equal bilaterally. to rule out rotator cuff tear  Labs:None at this time. Xr Shoulder Right (min 2 Views)    Result Date: 2020  Radiology exam is complete. No Radiologist dictation. Please follow up with ordering provider. Past Surgical History:   Procedure Laterality Date    CARDIAC CATHETERIZATION  2015    Dr Jesus Madden    COLONOSCOPY  1-10-11    CORONARY ARTERY BYPASS GRAFT  12.    [place]    DENTAL SURGERY      PROSTATECTOMY N/A 2019    ROBOTIC LAPAROSCOPIC RADICAL PROSTATECTOMY WITH BILATERAL LYMPH NODE DISSECTION AND RIGHT SIDE NERVE SPARING performed by Shruti Mtz MD at [address] [place] [address]   . Office Procedures:  Orders Placed This Encounter   Procedures    XR SHOULDER RIGHT (MIN 2 VIEWS)     Standing Status:   Future     Number of Occurrences:   1     Standing Expiration Date:   2021       Previous Treatments: Ice, rest, exercises, X-ray, anti-inflammatories,    Differential Diagnoses: Impingement, AC joint osteoarthritis, Rotator cuff tear, Labral tear, Instability, loose body, long head of bicep injury, glenohumeral osteoarthritis, AC joint separation, SLAP tear, Posterior labral tear, Anterior Labral tear, neck pathology, brachioplexis injury, muscle injury, neck radiculopathy, bone tumor, fracture or stress fracture. Diagnosis rotator cuff tear versus labral tear      Plan (Medical Decision Making):    I discussed the diagnosis and the treatment options with Nimesh Coats today. In Summary:  The various treatment options were outlined and discussed with Nimesh Coats including:  Conservative care options: physical therapy, ice, medications, bracing, and activity modification. The indications for therapeutic injections. The indications for additional imaging/laboratory studies. The indications for (possible future) interventions. After considering the various options discussed, Nimesh Coats elected to pursue a course of treatment that includes the followin.

## 2020-09-01 ENCOUNTER — HOSPITAL ENCOUNTER (OUTPATIENT)
Dept: PHYSICAL THERAPY | Age: 70
Setting detail: THERAPIES SERIES
Discharge: HOME | End: 2020-09-01
Payer: COMMERCIAL

## 2020-09-01 PROCEDURE — 97161 PT EVAL LOW COMPLEX 20 MIN: CPT

## 2020-09-01 PROCEDURE — 97530 THERAPEUTIC ACTIVITIES: CPT

## 2020-09-01 PROCEDURE — 97110 THERAPEUTIC EXERCISES: CPT

## 2020-09-01 NOTE — PLAN OF CARE
Middle Trap     Rhomboids     Lower Trap      Pronation      Supination      Wrist Flex (C7)     Wrist Ext (C6)     Wrist Radial Deviation     Wrist Ulnar Deviation                    Joint mobility: NA   []Normal    []Hypo   []Hyper      Orthopaedic Special Tests Positive  Negative  NT Comments    Shoulder        Neer        Jesus-Lui Y   R shoulder   Empty Can  Y     Drop Arm       [place]'s       Speeds        Sulcus        Apprehension (Anterior / Posterior)       Load and Shift              Elbow        Cozen's       Varus Stress       Valgus Stress        Tinel's                                                   [x] Patient history, allergies, meds reviewed. Medical chart reviewed. See intake form. Review Of Systems (ROS):  [x]Performed Review of systems (Integumentary, CardioPulmonary, Neurological) by intake and observation. Intake form has been scanned into medical record. Patient has been instructed to contact their primary care physician regarding ROS issues if not already being addressed at this time.       Co-morbidities/Complexities (which will affect course of rehabilitation):  []None           Arthritic conditions   []Rheumatoid arthritis (M05.9)  []Osteoarthritis (M19.91)   Cardiovascular conditions   []Hypertension (I10)  []Hyperlipidemia (E78.5)  []Angina pectoris (I20)  []Atherosclerosis (I70)   Musculoskeletal conditions   []Disc pathology   []Congenital spine pathologies   []Prior surgical intervention  []Osteoporosis (M81.8)  []Osteopenia (M85.8)   Endocrine conditions   []Hypothyroid (E03.9)  []Hyperthyroid Gastrointestinal conditions   []Constipation (F91.27)   Metabolic conditions   []Morbid obesity (E66.01)  []Diabetes type 1(E10.65) or 2 (E11.65)   []Neuropathy (G60.9)     Pulmonary conditions   []Asthma (J45)  []Coughing   []COPD (J44.9)   Psychological Disorders  []Anxiety (F41.9)  []Depression (F32.9)   []Other:   [x]Other:     Prostate cancer, bypass (2015)     Barriers to/and or personal factors that will affect rehab potential:              [x]Age  [x]Sex    []Smoker              []Motivation/Lack of Motivation                        [x]Co-Morbidities              []Cognitive Function, education/learning barriers              [x]Environmental, home barriers              [x]profession/work barriers  []past PT/medical experience  []other:  Justification:     Falls Risk Assessment (30 days):   [x] Falls Risk assessed and no intervention required. [] Falls Risk assessed and Patient requires intervention due to being higher risk   TUG score (>12s at risk):     [] Falls education provided, including       ASSESSMENT: Lesly Flanagan is a 81 y.o. male presenting to physical therapy with c/o R shoulder pain and dec ROM. Pt demonstrates TTP R supraspinatus tendon and R lateral epicondylitis, dec R shoulder ROM, forward rounded shoulder posturing B. Pt would benefit from skilled PT to return to PLOF and dec pain with work-related tasks.     Functional Impairments   []Noted spinal or UE joint hypomobility   []Noted spinal or UE joint hypermobility   [x]Decreased UE functional ROM   []Decreased UE functional strength   []Abnormal reflexes/sensation/myotomal/dermatomal deficits   [x]Decreased RC/scapular/core strength and neuromuscular control   []other:      Functional Activity Limitations (from functional questionnaire and intake)   []Reduced ability to tolerate prolonged functional positions   []Reduced ability or difficulty with changes of positions or transfers between positions   []Reduced ability to maintain good posture and demonstrate good body mechanics with sitting, bending, and lifting   [] Reduced ability or tolerance with driving and/or computer work   []Reduced ability to sleep   [x]Reduced ability to perform lifting, reaching, carrying tasks   []Reduced ability to tolerate impact through UE   [x]Reduced ability to reach behind back   [x]Reduced ability to  or hold objects   []Reduced ability to throw or toss an object   []other:    Participation Restrictions   []Reduced participation in self care activities   []Reduced participation in home management activities   [x]Reduced participation in work activities   [x]Reduced participation in social activities. []Reduced participation in sport/recreation activities. Classification:   []Signs/symptoms consistent with post-surgical status including decreased ROM, strength and function.   []Signs/symptoms consistent with joint sprain/strain   [x]Signs/symptoms consistent with shoulder impingement   [x]Signs/symptoms consistent with shoulder/elbow/wrist tendinopathy   []Signs/symptoms consistent with Rotator cuff tear   []Signs/symptoms consistent with labral tear   []Signs/symptoms consistent with postural dysfunction    []Signs/symptoms consistent with Glenohumeral IR Deficit - <45 degrees   []Signs/symptoms consistent with facet dysfunction of cervical/thoracic spine    []Signs/symptoms consistent with pathology which may benefit from Dry needling     []other:     Prognosis/Rehab Potential:      [x]Excellent   []Good    []Fair   []Poor    Tolerance of evaluation/treatment:    [x]Excellent   []Good    []Fair   []Poor    Physical Therapy Evaluation Complexity Justification  [x] A history of present problem with:  [] no personal factors and/or comorbidities that impact the plan of care;  [x]1-2 personal factors and/or comorbidities that impact the plan of care  []3 personal factors and/or comorbidities that impact the plan of care  [x] An examination of body systems using standardized tests and measures addressing any of the following: body structures and functions (impairments), activity limitations, and/or participation restrictions;:  [x] a total of 1-2 or more elements   [] a total of 3 or more elements   [] a total of 4 or more elements   [x] A clinical presentation with:  [x] stable and/or uncomplicated characteristics   [] evolving clinical presentation with changing characteristics  [] unstable and unpredictable characteristics;   [x] Clinical decision making of [x] low, [] moderate, [] high complexity using standardized patient assessment instrument and/or measurable assessment of functional outcome. [x] EVAL (LOW) 59790 (typically 15 minutes face-to-face)  [] EVAL (MOD) 39316 (typically 30 minutes face-to-face)  [] EVAL (HIGH) 34689 (typically 45 minutes face-to-face)  [] RE-EVAL     PLAN:  Frequency/Duration:  1 day after 2 weeks of completing HEP independently:  INTERVENTIONS:  [x] Therapeutic exercise including: strength training, ROM, for Upper extremity and core   [x]  NMR activation and proprioception for UE, scap and Core   [x] Manual therapy as indicated for shoulder, scapula and spine to include: Dry Needling/IASTM, STM, PROM, Gr I-IV mobilizations, manipulation. [x] Modalities as needed that may include: thermal agents, E-stim, Biofeedback, US, iontophoresis as indicated  [x] Patient education on joint protection, postural re-education, activity modification, progression of HEP. 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 [] Progressing: [] Met: [] Not Met: [] Adjusted    Therapist goals for Patient:   Short Term Goals: To be achieved in: 2 weeks  1. Independent in HEP and progression per patient tolerance, in order to prevent re-injury. [] Progressing: [] Met: [] Not Met: [] Adjusted  2. Patient will have a decrease in pain to facilitate improvement in movement, function, and ADLs as indicated by Functional Deficits. [] Progressing: [] Met: [] Not Met: [] Adjusted    Long Term Goals: To be achieved in: 2 weeks  1. Disability index score of 5% or less for the UEFI or Quick DASH to assist with reaching prior level of function. [] Progressing: [] Met: [] Not Met: [] Adjusted  2.  Patient will demonstrate increased AROM to [facility] to allow for proper joint functioning as indicated by patients Functional Deficits. [] Progressing: [] Met: [] Not Met: [] Adjusted  3. Patient will return to functional activities including reaching behind back, lifting and gripping objects without increased symptoms or restriction. [] Progressing: [] Met: [] Not Met: [] Adjusted       Electronically signed by:  Yoshi Esparza, PT   Therapist was present, directed the patient's care, made skilled judgement, and was responsible for assessment and treatment of the patient.       Scott Boles, SPT

## 2020-09-01 NOTE — FLOWSHEET NOTE
[address] [facility] Physical Therapy  Phone: [phone]   Fax: [phone]    Physical Therapy Daily Treatment Note  Date:  2020    Patient Name:  Jaylon Ignacio    :  1950  MRN: [id number]  Medical/Treatment Diagnosis Information:  · Diagnosis: M25.511 (ICD-10-CM) - Acute pain of right shoulder  · Treatment Diagnosis: TTP R supraspinatus tendon and R lateral epicondylitis, dec R shoulder ROM, forward rounded shoulder posturing B  Insurance/Certification information:  PT Insurance Information: medical [place]  Physician Information:  Referring Practitioner: Mitch Hester DO  Plan of care signed (Y/N): []  Yes [x]  No     Date of Patient follow up with Physician: PRN     Progress Report: []  Yes  [x]  No     Date Range for reporting period:  Beginnin/1  Ending:     Progress report due (10 Rx/or 30 days whichever is less): visit #10 or 75/0 (date)     Recertification due (POC duration/ or 90 days whichever is less): 10/1 (date)     Visit # Insurance Allowable Auth required?  Date Range   1 Med nec []  Yes  [x]  No Through      Latex Allergy:  [x]NO      []YES  Preferred Language for Healthcare:   [x]English       []other:    Functional Scale:        Date assessed:  LEFS: Quick DASH: raw score = 14; dysfunction = 9% 2020    Pain level:  0/10     SUBJECTIVE:  See eval    OBJECTIVE: See eval      RESTRICTIONS/PRECAUTIONS: hx of prostate cancer, bypass ()     Exercises/Interventions:     Therapeutic Exercises (93515) Resistance / level Sets/sec Reps Notes   UBE: fwd/retro              TB:  · Rows  · LPD  · ER              Prone [place]                      HEP review  Per HEP Per HEP X 15 min          Therapeutic Activities (20718)              Education provided: pathomechanics of diagnosis, PT POC, PT scheduling, activity modifications  X 10 min                          Neuromuscular Re-ed (15858)                                                 Manual Intervention ([address] [facility])                                                     Pt. Education:  -patient educated on diagnosis, prognosis and expectations for rehab  -all patient questions were answered    Home Exercise Program:  Access Code: [email or web address]   URL: [email or web address]   Date: 09/01/2020   Prepared by: Freddy Acosta     Exercises   Sleeper Stretch - 2 reps - 1 sets - 30 hold - 2x daily - 7x weekly   Isometric Shoulder Flexion at Wall - 10 reps - 1 sets - 5 hold - 1x daily - 7x weekly   Isometric Shoulder Abduction at Wall - 10 reps - 1 sets - 5 hold - 1x daily - 7x weekly   Isometric Shoulder External Rotation at Wall - 10 reps - 1 sets - 5 hold - 1x daily - 7x weekly   Standing Shoulder External Rotation with Resistance - 10 reps - 2 sets - 1x daily - 7x weekly   Seated Eccentric Wrist Extension - 10 reps - 1 sets - 1x daily - 7x weekly         Therapeutic Exercise and NMR EXR  [x] (75062) Provided verbal/tactile cueing for activities related to strengthening, flexibility, endurance, ROM for improvements in  [] LE / Lumbar: LE, proximal hip, and core control with self care, mobility, lifting, ambulation. [x] UE / Cervical: cervical, postural, scapular, scapulothoracic and UE control with self care, reaching, carrying, lifting, house/yardwork, driving, computer work.  [] (72649) Provided verbal/tactile cueing for activities related to improving balance, coordination, kinesthetic sense, posture, motor skill, proprioception to assist with   [] LE / lumbar: LE, proximal hip, and core control in self care, mobility, lifting, ambulation and eccentric single leg control.    [] UE / cervical: cervical, scapular, scapulothoracic and UE control with self care, reaching, carrying, lifting, house/yardwork, driving, computer work.   [] (98441) Therapist is in constant attendance of 2 or more patients providing skilled therapy interventions, but not providing any significant amount of measurable one-on-one time to either coordination, kinesthetic sense, posture, motor skill, proprioception of   [] LE: core, proximal hip and LE for self care, mobility, lifting, and ambulation/stair navigation    [] UE / Cervical: cervical, postural,  scapular, scapulothoracic and UE control with self care, reaching, carrying, lifting, house/yardwork, driving, computer work    Manual Treatments:  PROM / STM / Oscillations-Mobs:  G-I, II, III, IV (PA's, Inf., Post.)  [] (63756) Provided manual therapy to mobilize LE, proximal hip and/or LS spine soft tissue/joints for the purpose of modulating pain, promoting relaxation,  increasing ROM, reducing/eliminating soft tissue swelling/inflammation/restriction, improving soft tissue extensibility and allowing for proper ROM for normal function with   [] LE / lumbar: self care, mobility, lifting and ambulation. [] UE / Cervical: self care, reaching, carrying, lifting, house/yardwork, driving, computer work. Modalities:  [] (08811) Vasopneumatic compression: Utilized vasopneumatic compression to decrease edema / swelling for the purpose of improving mobility and quad tone / recruitment which will allow for increased overall function including but not limited to self-care, transfers, ambulation, and ascending / descending stairs. 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   [] Progressing: [] Met: [] Not Met: [] Adjusted    Therapist goals for Patient:   Short Term Goals: To be achieved in: 2 weeks  1. Independent in HEP and progression per patient tolerance, in order to prevent re-injury. [] Progressing: [] Met: [] Not Met: [] Adjusted  2. Patient will have a decrease in pain to facilitate improvement in movement, function, and ADLs as indicated by Functional Deficits. [] Progressing: [] Met: [] Not Met: [] Adjusted    Long Term Goals: To be achieved in: 2 weeks  1. Disability index score of 5% or less for the UEFI or Quick DASH to assist with reaching prior level of function. [] Progressing: [] Met: [] Not Met: [] Adjusted  2. Patient will demonstrate increased AROM to [facility] to allow for proper joint functioning as indicated by patients Functional Deficits. [] Progressing: [] Met: [] Not Met: [] Adjusted  3. Patient will return to functional activities including reaching behind back, lifting and gripping objects without increased symptoms or restriction. [] Progressing: [] Met: [] Not Met: [] Adjusted    Overall Progression Towards Functional goals/ Treatment Progress Update:  [] Patient is progressing as expected towards functional goals listed. [] Progression is slowed due to complexities/Impairments listed. [] Progression has been slowed due to co-morbidities.   [x] Plan just implemented, too soon to assess goals progression <30days   [] Goals require adjustment due to lack of progress  [] Patient is not progressing as expected and requires additional follow up with physician  [] Other    Persisting Functional Limitations/Impairments:  []Sleeping []Sitting               []Standing []Transfers        []Walking []Kneeling               []Stairs []Squatting / bending   [x]ADLs [x]Reaching  [x]Lifting  []Housework  []Driving [x]Job related tasks  []Sports/Recreation []Other:        ASSESSMENT:  See eval    Treatment/Activity Tolerance:  [x] Patient able to complete tx [] Patient limited by fatigue  [] Patient limited by pain  [] Patient limited by other medical complications  [] Other:     Prognosis: [x] Good [] Fair  [] Poor    Patient Requires Follow-up: [x] Yes  [] No    Plan for next treatment session:    PLAN: See eval. PT 1x / week for 1 weeks. [] Continue per plan of care [] Alter current plan (see comments)  [x] Plan of care initiated [] Hold pending MD visit [] Discharge    Electronically signed by: Elvi Torres DPT  Therapist was present, directed the patient's care, made skilled judgement, and was responsible for assessment and treatment of the patient. Scott Boles, SPT    Note: If patient does not return for scheduled/ recommended follow up visits, this note will serve as a discharge from care along with most recent update on progress.

## 2020-09-16 ENCOUNTER — HOSPITAL ENCOUNTER (OUTPATIENT)
Dept: PHYSICAL THERAPY | Age: 70
Setting detail: THERAPIES SERIES
Discharge: HOME | End: 2020-09-16
Payer: COMMERCIAL

## 2020-09-16 PROCEDURE — 97110 THERAPEUTIC EXERCISES: CPT

## 2020-09-16 NOTE — PROGRESS NOTES
[address] [facility] Physical Therapy  Phone: [phone]   Fax: [phone]   Physical Therapy Discharge Summary    Dear Keira Green DO,    We had the pleasure of treating the following patient for physical therapy services at [address] [place] [address]. A summary of our findings can be found in the discharge summary below. If you have any questions or concerns regarding these findings, please do not hesitate to contact me at the office phone number checked above. Thank you for the referral.     Physician Signature:________________________________Date:__________________  By signing above (or electronic signature), therapists plan is approved by physician      Functional Outcome: : Quick DASH: raw score = 14; dysfunction = 9%    Overall Response to Treatment:   []Patient is responding well to treatment and improvement is noted with regards  to goals   []Patient should continue to improve in reasonable time if they continue HEP   []Patient has plateaued and is no longer responding to skilled PT intervention    []Patient is getting worse and would benefit from return to referring MD   []Patient unable to adhere to initial POC   [x]Other: Pt is independent with HEP, has no further questions or concerns for therapy and would like to d/c at this time. Pt has met 2 out of 2 STG and 2 out of 3 LTG on this date. Pt instructed to follow up with MD for further needs, if needed. Date range of Visits: -2020  Total Visits: 2    Recommendation:    [x] Discharge to [facility]. Follow up with PT PRN.          Physical Therapy Daily Treatment Note  Date:  2020    Patient Name:  Kyler Santana    :  1950  MRN: [id number]  Medical/Treatment Diagnosis Information:  · Diagnosis: M25.511 (ICD-10-CM) - Acute pain of right shoulder  · Treatment Diagnosis: TTP R supraspinatus tendon and R lateral epicondylitis, dec R shoulder ROM, forward rounded shoulder posturing B  Insurance/Certification information:  PT Insurance Information: medical [place]  Physician Information:  Referring Practitioner: Claudio Valdovinos DO  Plan of care signed (Y/N): []  Yes [x]  No     Date of Patient follow up with Physician: PRN     Progress Report: []  Yes  [x]  No     Date Range for reporting period:  Beginnin/1  Ending:     Progress report due (10 Rx/or 30 days whichever is less): visit #10 or /6 (date)     Recertification due (POC duration/ or 90 days whichever is less): 10/1 (date)     Visit # Insurance Allowable Auth required? Date Range   2 Med nec []  Yes  [x]  No Through      Latex Allergy:  [x]NO      []YES  Preferred Language for Healthcare:   [x]English       []other:    Functional Scale:        Date assessed:  Quick DASH: raw score = 14; dysfunction = 9%  2020    Pain level:  0/10     SUBJECTIVE:  : Reports he is doing well, completes HEP every other day. OBJECTIVE: See eval      RESTRICTIONS/PRECAUTIONS: hx of prostate cancer, bypass ()     Exercises/Interventions:     Therapeutic Exercises (40930) Resistance / level Sets/sec Reps Notes   UBE: fwd/retro 2 min fwd/ 2 min retro                    Prone [place]        [employer] [facility]:  · Rows  · LPD  · ER   40#  30#  10#   2  2  2   10  10  10           HEP review  Per HEP Per HEP X 10 min          Therapeutic Activities (53678)              Education provided: pathomechanics of diagnosis, PT POC, PT scheduling, activity modifications                            Neuromuscular Re-ed (88670)                                                 Manual Intervention ([phone])                                                     Pt. Education:  -patient educated on diagnosis, prognosis and expectations for rehab  -all patient questions were answered    Home Exercise Program:  Access Code: [email or web address]   URL: [email or web address]   Date: 2020   Prepared by: Severiano Starling Exercises Sleeper Stretch - 2 (22132 or ) Provided verbal/tactile cueing for activities related to improving balance, coordination, kinesthetic sense, posture, motor skill, proprioception and motor activation to allow for proper function of   [] LE: / Lumbar core, proximal hip and LE with self care and ADLs  [x] UE / Cervical: cervical, postural, scapular, scapulothoracic and UE control with self care, carrying, lifting, driving, computer work.   [] (46452) Gait Re-education- Provided training and instruction to the patient for proper LE, core and proximal hip recruitment and positioning and eccentric body weight control with ambulation re-education including up and down stairs     Home Management Training / Self Care:  [] (03194) Provided self-care/home management training related to activities of daily living and compensatory training, and/or use of adaptive equipment for improvement with: ADLs and compensatory training, meal preparation, safety procedures and instruction in use of adaptive equipment, including bathing, grooming, dressing, personal hygiene, basic household cleaning and chores.      Home Exercise Program:    [x] (92276) Reviewed/Progressed HEP activities related to strengthening, flexibility, endurance, ROM of   [] LE / Lumbar: core, proximal hip and LE for functional self-care, mobility, lifting and ambulation/stair navigation   [x] UE / Cervical: cervical, postural, scapular, scapulothoracic and UE control with self care, reaching, carrying, lifting, house/yardwork, driving, computer work  [] (59583)Reviewed/Progressed HEP activities related to improving balance, coordination, kinesthetic sense, posture, motor skill, proprioception of   [] LE: core, proximal hip and LE for self care, mobility, lifting, and ambulation/stair navigation    [] UE / Cervical: cervical, postural,  scapular, scapulothoracic and UE control with self care, reaching, carrying, lifting, house/yardwork, driving, computer work    Manual Treatments: care [] Alter current plan (see comments)  [] Plan of care initiated [] Hold pending MD visit [x] Discharge    Electronically signed by: Wendie Browne DPT  Therapist was present, directed the patient's care, made skilled judgement, and was responsible for assessment and treatment of the patient. Scott Boles, SPT    Note: If patient does not return for scheduled/ recommended follow up visits, this note will serve as a discharge from care along with most recent update on progress.

## 2021-01-06 ENCOUNTER — HOSPITAL ENCOUNTER (OUTPATIENT)
Age: 71
Discharge: HOME | End: 2021-01-06
Payer: COMMERCIAL

## 2021-01-06 LAB — PROSTATE SPECIFIC ANTIGEN: 0.03 NG/ML (ref 0–4)

## 2021-01-06 PROCEDURE — 84153 ASSAY OF PSA TOTAL: CPT

## 2021-01-06 PROCEDURE — 36415 COLL VENOUS BLD VENIPUNCTURE: CPT

## 2021-02-03 ENCOUNTER — TELEPHONE (OUTPATIENT)
Dept: FAMILY MEDICINE CLINIC | Age: 71
End: 2021-02-03

## 2021-02-03 ENCOUNTER — VIRTUAL VISIT (OUTPATIENT)
Dept: FAMILY MEDICINE CLINIC | Age: 71
End: 2021-02-03
Payer: COMMERCIAL

## 2021-02-03 DIAGNOSIS — R50.9 FEVER, UNSPECIFIED FEVER CAUSE: ICD-10-CM

## 2021-02-03 DIAGNOSIS — R53.83 FATIGUE, UNSPECIFIED TYPE: ICD-10-CM

## 2021-02-03 DIAGNOSIS — R09.89 RUNNY NOSE: ICD-10-CM

## 2021-02-03 DIAGNOSIS — Z12.11 COLON CANCER SCREENING: Primary | ICD-10-CM

## 2021-02-03 DIAGNOSIS — R05.9 COUGH: ICD-10-CM

## 2021-02-03 DIAGNOSIS — R52 GENERALIZED BODY ACHES: ICD-10-CM

## 2021-02-03 PROCEDURE — 87804 INFLUENZA ASSAY W/OPTIC: CPT | Performed by: FAMILY MEDICINE

## 2021-02-03 PROCEDURE — 99441 PR PHYS/QHP TELEPHONE EVALUATION 5-10 MIN: CPT | Performed by: FAMILY MEDICINE

## 2021-02-03 RX ORDER — BENZONATATE 100 MG/1
100 CAPSULE ORAL 3 TIMES DAILY PRN
Qty: 30 CAPSULE | Refills: 1 | Status: SHIPPED | OUTPATIENT
Start: 2021-02-03 | End: 2021-02-13

## 2021-02-03 RX ORDER — AZITHROMYCIN 250 MG/1
250 TABLET, FILM COATED ORAL DAILY
Qty: 1 PACKET | Refills: 0 | Status: SHIPPED | OUTPATIENT
Start: 2021-02-03 | End: 2021-08-19

## 2021-02-03 RX ORDER — FLUTICASONE PROPIONATE 50 MCG
1 SPRAY, SUSPENSION (ML) NASAL DAILY
Qty: 1 BOTTLE | Refills: 0 | Status: SHIPPED | OUTPATIENT
Start: 2021-02-03 | End: 2021-08-23 | Stop reason: ALTCHOICE

## 2021-02-03 ASSESSMENT — PATIENT HEALTH QUESTIONNAIRE - PHQ9
SUM OF ALL RESPONSES TO PHQ QUESTIONS 1-9: 0
SUM OF ALL RESPONSES TO PHQ QUESTIONS 1-9: 0
SUM OF ALL RESPONSES TO PHQ9 QUESTIONS 1 & 2: 0

## 2021-02-03 NOTE — PROGRESS NOTES
Osito Jenkins is a 78 y.o. male evaluated via telephone on 2/3/2021. Originally scheduled for video visit but unable to connect. Consent:  He and/or health care decision maker is aware that that he may receive a bill for this telephone service, depending on his insurance coverage, and has provided verbal consent to proceed: Yes      Documentation:  I communicated with the patient and/or health care decision maker about cough/fever. Details of this discussion including any medical advice provided:     Pt presents today via telephone visit for a 2 week hx of cough, fever (highest temp was 99.5 F in the evenings, fatigue, and BA's. States that the last week he felt better then felt bad again over the last weekend. Felt better all day yesterday. Admits to wife being sick at  [employer]. Admits to runny nose (clear). Admits to anosmia last week. Cough is productive. A/P:  1) Cough - ZPak, Flonase, Tessalon Perles  2) BA's - OTC Tylenol or Ibuprofen  3) Fever - OTC Tylenol or Ibuprofen    Counseled pt to call [phone]. I affirm this is a Patient Initiated Episode with a Patient who has not had a related appointment within my department in the past 7 days or scheduled within the next 24 hours.     Patient identification was verified at the start of the visit: Yes    Total Time: minutes: 5-10 minutes    Note: not billable if this call serves to triage the patient into an appointment for the relevant concern      Wade Dawson

## 2021-02-03 NOTE — TELEPHONE ENCOUNTER
----- Message from Kya Lemos sent at 2/3/2021  1:48 PM EST -----  Subject: Message to Provider    QUESTIONS  Information for Provider? Patient needs order put in to have flu test done   tomorrow 2/4/2021 when he has his covid test done.   ---------------------------------------------------------------------------  --------------  CALL BACK INFO  What is the best way for the office to contact you? OK to leave message on   voicemail  Preferred Call Back Phone Number? [phone]  ---------------------------------------------------------------------------  --------------  SCRIPT ANSWERS  Relationship to Patient?  Self

## 2021-02-04 ENCOUNTER — OFFICE VISIT (OUTPATIENT)
Dept: PRIMARY CARE CLINIC | Age: 71
End: 2021-02-04
Payer: COMMERCIAL

## 2021-02-04 DIAGNOSIS — R05.9 COUGH: Primary | ICD-10-CM

## 2021-02-04 DIAGNOSIS — R52 GENERALIZED BODY ACHES: ICD-10-CM

## 2021-02-04 DIAGNOSIS — Z20.822 SUSPECTED COVID-19 VIRUS INFECTION: Primary | ICD-10-CM

## 2021-02-04 DIAGNOSIS — R53.83 FATIGUE, UNSPECIFIED TYPE: ICD-10-CM

## 2021-02-04 DIAGNOSIS — R50.9 FEVER, UNSPECIFIED FEVER CAUSE: ICD-10-CM

## 2021-02-04 LAB
INFLUENZA A ANTIBODY: NORMAL
INFLUENZA B ANTIBODY: NORMAL

## 2021-02-04 PROCEDURE — 99211 OFF/OP EST MAY X REQ PHY/QHP: CPT | Performed by: NURSE PRACTITIONER

## 2021-02-04 NOTE — PROGRESS NOTES
Summer Patient received a viral test for COVID-19. They were educated on isolation and quarantine as appropriate. For any symptoms, they were directed to seek care from their PCP, given contact information to establish with a doctor, directed to an urgent care or the emergency room.

## 2021-02-06 LAB — SARS-COV-2, NAA: NOT DETECTED

## 2021-05-06 ENCOUNTER — TELEPHONE (OUTPATIENT)
Dept: DERMATOLOGY | Age: 71
End: 2021-05-06

## 2021-05-06 NOTE — TELEPHONE ENCOUNTER
Patient is requesting a return call to reschedule 5/6 appt. Patient is hoping to get in prior to Aug- Sept. Please advise. Thank you!

## 2021-05-12 ENCOUNTER — OFFICE VISIT (OUTPATIENT)
Dept: DERMATOLOGY | Age: 71
End: 2021-05-12
Payer: COMMERCIAL

## 2021-05-12 VITALS — TEMPERATURE: 97.8 F

## 2021-05-12 DIAGNOSIS — L66.1 LICHEN PLANOPILARIS: ICD-10-CM

## 2021-05-12 DIAGNOSIS — D48.5 NEOPLASM OF UNCERTAIN BEHAVIOR OF SKIN: ICD-10-CM

## 2021-05-12 DIAGNOSIS — L57.0 AK (ACTINIC KERATOSIS): ICD-10-CM

## 2021-05-12 DIAGNOSIS — L82.1 SK (SEBORRHEIC KERATOSIS): Primary | ICD-10-CM

## 2021-05-12 PROCEDURE — 11102 TANGNTL BX SKIN SINGLE LES: CPT | Performed by: DERMATOLOGY

## 2021-05-12 PROCEDURE — 99213 OFFICE O/P EST LOW 20 MIN: CPT | Performed by: DERMATOLOGY

## 2021-05-12 RX ORDER — FLUOROURACIL 50 MG/G
CREAM TOPICAL
Qty: 40 G | Refills: 0 | Status: SHIPPED | OUTPATIENT
Start: 2021-05-12 | End: 2021-08-19

## 2021-05-12 NOTE — PATIENT INSTRUCTIONS
Biopsy Wound Care Instructions    · Keep the bandage in place for 24 hours. · Cleanse the wound with mild soapy water daily   Gently dry the area.  Apply Vaseline or petroleum jelly to the wound using a cotton tipped applicator.  Cover with a clean bandage.  Repeat this process until the biopsy site is healed.  If you had stitches placed, continue treating the site until the stitches are removed. Remember to make an appointment to return to have your stitches removed by our staff.  You may shower and bathe as usual.       ** Biopsy results generally take around 7 business days to come back. If you have not heard from us by then, please call the office at [phone]. *Please note that biopsy results are released to both the patient and physician at the same time in [address] Please allow time for your physician to review the results. One of our staff members will reach out to you with the results and plan.

## 2021-05-12 NOTE — PROGRESS NOTES
[facility]  Richard Shaffer MD  [address]  1950    78 y.o. male     Date of Visit: 5/12/2021    Chief Complaint: skin lesions    History of Present Illness:    1. He complains of several persistent lesions on the extremities and trunk. 2.  He also complains of recurrent scaly lesions on the cheeks. He reports being treated with cryotherapy in the past.    3.  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 It is asymptomatic. He has had some gradual progression since last visit. 4.  He also complains of a newly noted lesion on the right upper abdomen. 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 Review of Systems:  Gen: Feels well, good sense of health. Past Medical History, Family History, Surgical History, Medications and Allergies reviewed. Past Medical History:   Diagnosis Date    CAD (coronary artery disease)     Mixed hyperlipidemia 11/26/2018    Prostate cancer ([address] 1/21/2019     Past Surgical History:   Procedure Laterality Date    CARDIAC CATHETERIZATION  12/28/2015    Dr Jayro Henriquez    COLONOSCOPY  1-10-11    CORONARY ARTERY BYPASS GRAFT  12.30.2015    [facility]    DENTAL SURGERY      PROSTATECTOMY N/A 1/28/2019    ROBOTIC LAPAROSCOPIC RADICAL PROSTATECTOMY WITH BILATERAL LYMPH NODE DISSECTION AND RIGHT SIDE NERVE SPARING performed by Melissa Lawson MD at [facility]       No Known Allergies  Outpatient Medications Marked as Taking for the 5/12/21 encounter (Office Visit) with Lucy Muller MD   Medication Sig Dispense Refill    fluorouracil (EFUDEX) 5 % cream Apply twice daily to affected areas on the cheeks for 14 days.  40 g 0    azithromycin (ZITHROMAX Z-JAYLYN) 250 MG tablet Take 1 tablet by mouth daily As directed on pack 1 packet 0    tadalafil (CIALIS) 10 MG tablet Take 1 tablet by mouth as needed for Erectile Dysfunction TAKE 1 TABLET BY MOUTH AS NEEDED FOR ERECTILE DYSFUNCTION 30 tablet 5    loratadine

## 2021-05-14 LAB — DERMATOLOGY PATHOLOGY REPORT: NORMAL

## 2021-05-18 ENCOUNTER — NURSE TRIAGE (OUTPATIENT)
Dept: OTHER | Facility: CLINIC | Age: 71
End: 2021-05-18

## 2021-05-18 NOTE — TELEPHONE ENCOUNTER
Reason for Disposition   Eye pain/discomfort and more than mild    Answer Assessment - Initial Assessment Questions  1. ONSET: \"When did the pain start? \" (e.g., minutes, hours, days)      Last night     2. TIMING: \"Does the pain come and go, or has it been constant since it started? \" (e.g., constant, intermittent, fleeting)      Constant    3. SEVERITY: \"How bad is the pain? \"   (Scale 1-10; mild, moderate or severe)    - MILD (1-3): doesn't interfere with normal activities     - MODERATE (4-7): interferes with normal activities or awakens from sleep     - SEVERE (8-10): excruciating pain and patient unable to do normal activities      6-7/10    4. LOCATION: \"Where does it hurt? \"  (e.g., eyelid, eye, cheekbone)      Right eye     5. CAUSE: \"What do you think is causing the pain? \"      A scratch     6. VISION: \"Do you have blurred vision or changes in your vision? \"       No     7. EYE DISCHARGE: \"Is there any discharge (pus) from the eye(s)? \"  If yes, ask: \"What color is it? \"       Tears, clear     8. FEVER: \"Do you have a fever? \" If so, ask: \"What is it, how was it measured, and when did it start? \"      No      9. OTHER SYMPTOMS: \"Do you have any other symptoms? \" (e.g., headache, nasal discharge, facial rash)      Headache     10. PREGNANCY: \"Is there any chance you are pregnant? \" \"When was your last menstrual period? \"        N/a    Protocols used: EYE PAIN-ADULT-[place]    Received call from Lucie Herbert at pre-service center [facility]) [place] with Red Flag Complaint. Brief description of triage: eye pain. Triage indicates for patient to be seen today, I recommended [facility] if unable to get in. Care advice provided, patient verbalizes understanding; denies any other questions or concerns; instructed to call back for any new or worsening symptoms. Writer provided warm transfer to [place] at [facility] for appointment scheduling. Attention Provider:   Thank you for allowing me to participate in the care of your patient. The patient was connected to triage in response to information provided to the ECC. Please do not respond through this encounter as the response is not directed to a shared pool.

## 2021-08-19 ENCOUNTER — OFFICE VISIT (OUTPATIENT)
Dept: DERMATOLOGY | Age: 71
End: 2021-08-19
Payer: COMMERCIAL

## 2021-08-19 VITALS — TEMPERATURE: 96.4 F

## 2021-08-19 DIAGNOSIS — L57.0 AK (ACTINIC KERATOSIS): Primary | ICD-10-CM

## 2021-08-19 DIAGNOSIS — L82.1 SK (SEBORRHEIC KERATOSIS): ICD-10-CM

## 2021-08-19 PROCEDURE — 17000 DESTRUCT PREMALG LESION: CPT | Performed by: DERMATOLOGY

## 2021-08-19 PROCEDURE — 99212 OFFICE O/P EST SF 10 MIN: CPT | Performed by: DERMATOLOGY

## 2021-08-19 NOTE — PROGRESS NOTES
[facility]  Dae Torres MD  [address]  1950    78 y.o. male     Date of Visit: 8/19/2021    Chief Complaint: skin lesions    History of Present Illness:    1. He returns today to follow-up for history of multiple AK's on the cheeks-reports marked improvement with use of Efudex cream twice daily for 2 weeks. He has 1 remaining lesion. 2.  He also complains of a couple of scaly lesions on the right forearm and left abdomen. Review of Systems:  Gen: Feels well, good sense of health. Past Medical History, Family History, Surgical History, Medications and Allergies reviewed. Past Medical History:   Diagnosis Date    CAD (coronary artery disease)     Mixed hyperlipidemia 11/26/2018    Prostate cancer ([facility] [address] 1/21/2019     Past Surgical History:   Procedure Laterality Date    CARDIAC CATHETERIZATION  12/28/2015    Dr Jovana Pineda    COLONOSCOPY  1-10-11    CORONARY ARTERY BYPASS GRAFT  12.30.2015    [place]    DENTAL SURGERY      PROSTATECTOMY N/A 1/28/2019    ROBOTIC LAPAROSCOPIC RADICAL PROSTATECTOMY WITH BILATERAL LYMPH NODE DISSECTION AND RIGHT SIDE NERVE SPARING performed by Phong Alexander MD at [facility]       No Known Allergies  Outpatient Medications Marked as Taking for the 8/19/21 encounter (Office Visit) with Kathi Mckeon MD   Medication Sig Dispense Refill    tadalafil (CIALIS) 10 MG tablet Take 1 tablet by mouth as needed for Erectile Dysfunction TAKE 1 TABLET BY MOUTH AS NEEDED FOR ERECTILE DYSFUNCTION 30 tablet 5    loratadine (CLARITIN) 10 MG tablet Take 1 tablet by mouth daily 30 tablet 5    Bacillus Coagulans-Inulin (PROBIOTIC FORMULA PO) Take by mouth      aspirin 81 MG tablet Take 1 tablet by mouth daily 90 tablet 3         Physical Examination       Well-appearing. 1.  Right central cheek with a hyperkeratotic erythematous macule.     2.  Right forearm and left abdomen with 2 stuck on appearing verrucous brown papules. Assessment and Plan     1. AK (actinic keratosis)     2 cycles of liquid nitrogen applied to 1 AK on the right cheek. Patient was educated regarding the potential risks of blister formation and discomfort. Wound care was discussed. 2. SK (seborrheic keratosis)     Reassurance.           --Luis Miguel Miguel MD

## 2021-08-23 ENCOUNTER — OFFICE VISIT (OUTPATIENT)
Dept: FAMILY MEDICINE CLINIC | Age: 71
End: 2021-08-23
Payer: COMMERCIAL

## 2021-08-23 VITALS
SYSTOLIC BLOOD PRESSURE: 138 MMHG | TEMPERATURE: 98.3 F | HEIGHT: 66 IN | RESPIRATION RATE: 16 BRPM | OXYGEN SATURATION: 98 % | WEIGHT: 180 LBS | DIASTOLIC BLOOD PRESSURE: 78 MMHG | HEART RATE: 54 BPM | BODY MASS INDEX: 28.93 KG/M2

## 2021-08-23 DIAGNOSIS — Z00.00 ANNUAL PHYSICAL EXAM: Primary | ICD-10-CM

## 2021-08-23 DIAGNOSIS — Z12.5 PROSTATE CANCER SCREENING: ICD-10-CM

## 2021-08-23 DIAGNOSIS — I25.10 CORONARY ARTERY DISEASE INVOLVING NATIVE CORONARY ARTERY OF NATIVE HEART WITHOUT ANGINA PECTORIS: ICD-10-CM

## 2021-08-23 DIAGNOSIS — E78.2 MIXED HYPERLIPIDEMIA: ICD-10-CM

## 2021-08-23 LAB
A/G RATIO: 1.5 (ref 1.1–2.2)
ALBUMIN SERPL-MCNC: 4.3 G/DL (ref 3.4–5)
ALP BLD-CCNC: 71 U/L (ref 40–129)
ALT SERPL-CCNC: 21 U/L (ref 10–40)
ANION GAP SERPL CALCULATED.3IONS-SCNC: 10 MMOL/L (ref 3–16)
AST SERPL-CCNC: 24 U/L (ref 15–37)
BASOPHILS ABSOLUTE: 0.1 K/UL (ref 0–0.2)
BASOPHILS RELATIVE PERCENT: 1.1 %
BILIRUB SERPL-MCNC: 0.5 MG/DL (ref 0–1)
BUN BLDV-MCNC: 18 MG/DL (ref 7–20)
CALCIUM SERPL-MCNC: 10.5 MG/DL (ref 8.3–10.6)
CHLORIDE BLD-SCNC: 106 MMOL/L (ref 99–110)
CHOLESTEROL, TOTAL: 197 MG/DL (ref 0–199)
CO2: 26 MMOL/L (ref 21–32)
CREAT SERPL-MCNC: 0.9 MG/DL (ref 0.8–1.3)
EOSINOPHILS ABSOLUTE: 0.1 K/UL (ref 0–0.6)
EOSINOPHILS RELATIVE PERCENT: 1.9 %
GFR AFRICAN AMERICAN: >60
GFR NON-AFRICAN AMERICAN: >60
GLOBULIN: 2.9 G/DL
GLUCOSE BLD-MCNC: 106 MG/DL (ref 70–99)
HCT VFR BLD CALC: 45.8 % (ref 40.5–52.5)
HDLC SERPL-MCNC: 50 MG/DL (ref 40–60)
HEMOGLOBIN: 15.1 G/DL (ref 13.5–17.5)
LDL CHOLESTEROL CALCULATED: 128 MG/DL
LYMPHOCYTES ABSOLUTE: 1.7 K/UL (ref 1–5.1)
LYMPHOCYTES RELATIVE PERCENT: 24.7 %
MCH RBC QN AUTO: 28.6 PG (ref 26–34)
MCHC RBC AUTO-ENTMCNC: 33.1 G/DL (ref 31–36)
MCV RBC AUTO: 86.5 FL (ref 80–100)
MONOCYTES ABSOLUTE: 0.5 K/UL (ref 0–1.3)
MONOCYTES RELATIVE PERCENT: 8.1 %
NEUTROPHILS ABSOLUTE: 4.3 K/UL (ref 1.7–7.7)
NEUTROPHILS RELATIVE PERCENT: 64.2 %
PDW BLD-RTO: 14.3 % (ref 12.4–15.4)
PLATELET # BLD: 238 K/UL (ref 135–450)
PMV BLD AUTO: 9.1 FL (ref 5–10.5)
POTASSIUM SERPL-SCNC: 5.5 MMOL/L (ref 3.5–5.1)
PROSTATE SPECIFIC ANTIGEN: 0.03 NG/ML (ref 0–4)
RBC # BLD: 5.29 M/UL (ref 4.2–5.9)
SODIUM BLD-SCNC: 142 MMOL/L (ref 136–145)
TOTAL PROTEIN: 7.2 G/DL (ref 6.4–8.2)
TRIGL SERPL-MCNC: 94 MG/DL (ref 0–150)
TSH REFLEX: 1.4 UIU/ML (ref 0.27–4.2)
VLDLC SERPL CALC-MCNC: 19 MG/DL
WBC # BLD: 6.7 K/UL (ref 4–11)

## 2021-08-23 PROCEDURE — 99397 PER PM REEVAL EST PAT 65+ YR: CPT | Performed by: FAMILY MEDICINE

## 2021-08-23 PROCEDURE — 36415 COLL VENOUS BLD VENIPUNCTURE: CPT | Performed by: FAMILY MEDICINE

## 2021-08-23 SDOH — ECONOMIC STABILITY: TRANSPORTATION INSECURITY
IN THE PAST 12 MONTHS, HAS LACK OF TRANSPORTATION KEPT YOU FROM MEETINGS, WORK, OR FROM GETTING THINGS NEEDED FOR DAILY LIVING?: NO

## 2021-08-23 SDOH — ECONOMIC STABILITY: TRANSPORTATION INSECURITY
IN THE PAST 12 MONTHS, HAS THE LACK OF TRANSPORTATION KEPT YOU FROM MEDICAL APPOINTMENTS OR FROM GETTING MEDICATIONS?: NO

## 2021-08-23 SDOH — ECONOMIC STABILITY: FOOD INSECURITY: WITHIN THE PAST 12 MONTHS, YOU WORRIED THAT YOUR FOOD WOULD RUN OUT BEFORE YOU GOT MONEY TO BUY MORE.: NEVER TRUE

## 2021-08-23 SDOH — ECONOMIC STABILITY: FOOD INSECURITY: WITHIN THE PAST 12 MONTHS, THE FOOD YOU BOUGHT JUST DIDN'T LAST AND YOU DIDN'T HAVE MONEY TO GET MORE.: NEVER TRUE

## 2021-08-23 SDOH — ECONOMIC STABILITY: HOUSING INSECURITY
IN THE LAST 12 MONTHS, WAS THERE A TIME WHEN YOU DID NOT HAVE A STEADY PLACE TO SLEEP OR SLEPT IN A SHELTER (INCLUDING NOW)?: NO

## 2021-08-23 SDOH — ECONOMIC STABILITY: INCOME INSECURITY: IN THE LAST 12 MONTHS, WAS THERE A TIME WHEN YOU WERE NOT ABLE TO PAY THE MORTGAGE OR RENT ON TIME?: NO

## 2021-08-23 ASSESSMENT — PATIENT HEALTH QUESTIONNAIRE - PHQ9
SUM OF ALL RESPONSES TO PHQ9 QUESTIONS 1 & 2: 0
1. LITTLE INTEREST OR PLEASURE IN DOING THINGS: 0
SUM OF ALL RESPONSES TO PHQ QUESTIONS 1-9: 0
SUM OF ALL RESPONSES TO PHQ QUESTIONS 1-9: 0
2. FEELING DOWN, DEPRESSED OR HOPELESS: 0
SUM OF ALL RESPONSES TO PHQ QUESTIONS 1-9: 0

## 2021-08-23 ASSESSMENT — SOCIAL DETERMINANTS OF HEALTH (SDOH): HOW HARD IS IT FOR YOU TO PAY FOR THE VERY BASICS LIKE FOOD, HOUSING, MEDICAL CARE, AND HEATING?: NOT HARD AT ALL

## 2021-08-23 ASSESSMENT — ENCOUNTER SYMPTOMS
ABDOMINAL PAIN: 0
SHORTNESS OF BREATH: 0
BACK PAIN: 0

## 2021-08-23 NOTE — PROGRESS NOTES
Anna Nino  YOB: 1950    Date of Service:  8/23/2021    Chief Complaint:   Anna Nino is a 78 y.o. male who presents for complete physical examination.     HPI:  HPI    Self-testicular exams: Yes  Sexual activity: has sex with females   Last eye exam: 3 month ago had FB with exam,normal  Exercise: walks 4 time(s) per week  Seatbelt use: yes  Are You a Spiritual person: yes  Living will: no    Wt Readings from Last 3 Encounters:   08/23/21 180 lb (81.6 kg)   08/13/20 188 lb (85.3 kg)   12/19/19 188 lb (85.3 kg)     BP Readings from Last 3 Encounters:   08/23/21 138/78   12/19/19 128/82   12/05/19 128/68       Patient Active Problem List   Diagnosis    Chest pain on exertion    Exertional chest pain    ACS (acute coronary syndrome) (HCC)    Abnormal stress test    Coronary artery disease involving native coronary artery of native heart without angina pectoris    S/P CABG x 5    Elevated PSA    Mixed hyperlipidemia    Prostate cancer ([address]    Psoas abscess (HCC)    Leukocytosis    Fever    Sepsis due to methicillin susceptible Staphylococcus aureus ([address]    History of prostate cancer    Status post prostatectomy    Coronary artery disease due to lipid rich plaque    Overweight    Complicated UTI (urinary tract infection)    Receiving intravenous antibiotic treatment as outpatient    Weight loss counseling, encounter for       Health Maintenance   Topic Date Due    COVID-19 Vaccine (1) Never done    Shingles Vaccine (2 of 3) 08/06/2014    Pneumococcal 65+ years Vaccine (1 of 1 - PPSV23) Never done    Flu vaccine (1) 09/01/2021    PSA counseling  01/06/2022    Colon cancer screen fecal DNA test (Cologuard)  02/23/2024    Lipid screen  06/14/2024    DTaP/Tdap/Td vaccine (2 - Td or Tdap) 06/13/2029    Hepatitis C screen  Completed    Hepatitis A vaccine  Aged Out    Hepatitis B vaccine  Aged Out    Hib vaccine  Aged Out    Meningococcal (ACWY) vaccine  Aged Out Immunization History   Administered Date(s) Administered    Tdap (Boostrix, Adacel) 06/13/2019    Zoster Live (Zostavax) 06/11/2014       No Known Allergies  Outpatient Medications Marked as Taking for the 8/23/21 encounter (Office Visit) with Ethan Shaffer, DO   Medication Sig Dispense Refill    tadalafil (CIALIS) 10 MG tablet Take 1 tablet by mouth as needed for Erectile Dysfunction TAKE 1 TABLET BY MOUTH AS NEEDED FOR ERECTILE DYSFUNCTION 30 tablet 5    aspirin 81 MG tablet Take 1 tablet by mouth daily 90 tablet 3       Past Medical History:   Diagnosis Date    CAD (coronary artery disease)     Mixed hyperlipidemia 11/26/2018    Prostate cancer ([facility] [address] 1/21/2019     Past Surgical History:   Procedure Laterality Date    CARDIAC CATHETERIZATION  12/28/2015    Dr Dellis Klinefelter    COLONOSCOPY  1-10-11    CORONARY ARTERY BYPASS GRAFT  12.30.2015    [place]    DENTAL SURGERY      PROSTATECTOMY N/A 1/28/2019    ROBOTIC LAPAROSCOPIC RADICAL PROSTATECTOMY WITH BILATERAL LYMPH NODE DISSECTION AND RIGHT SIDE NERVE SPARING performed by Karo Montes MD at [address] [place] [address],[address] [facility] History   Problem Relation Age of Onset    Heart Disease Father     Stroke Mother     Other Sister      Social History     Socioeconomic History    Marital status:      Spouse name: Tom Gomez    Number of children: 2    Years of education: 15    Highest education level: Not on file   Occupational History    Not on file   Tobacco Use    Smoking status: Never Smoker    Smokeless tobacco: Never Used   Vaping Use    Vaping Use: Never used   Substance and Sexual Activity    Alcohol use:  Yes     Alcohol/week: 7.0 standard drinks     Types: 7 Glasses of wine per week     Comment: per week    Drug use: No    Sexual activity: Not Currently   Other Topics Concern    Not on file   Social History Narrative    Not on file     Social Determinants of Health     Financial Resource Strain: Low Risk     Difficulty of Paying Erectile Dysfunction TAKE 1 TABLET BY MOUTH AS NEEDED FOR ERECTILE DYSFUNCTION Yes Kisha Pleitez MD   aspirin 81 MG tablet Take 1 tablet by mouth daily Yes Kisha Pleitez MD   Bacillus Coagulans-Inulin (PROBIOTIC FORMULA PO) Take by mouth  Patient not taking: Reported on 8/23/2021  Historical Provider, MD

## 2021-08-31 ENCOUNTER — OFFICE VISIT (OUTPATIENT)
Dept: FAMILY MEDICINE CLINIC | Age: 71
End: 2021-08-31
Payer: COMMERCIAL

## 2021-08-31 VITALS
DIASTOLIC BLOOD PRESSURE: 84 MMHG | BODY MASS INDEX: 29.66 KG/M2 | TEMPERATURE: 97.3 F | HEART RATE: 71 BPM | SYSTOLIC BLOOD PRESSURE: 132 MMHG | WEIGHT: 181 LBS | RESPIRATION RATE: 18 BRPM | OXYGEN SATURATION: 99 %

## 2021-08-31 DIAGNOSIS — H61.23 BILATERAL IMPACTED CERUMEN: Primary | ICD-10-CM

## 2021-08-31 PROCEDURE — 99213 OFFICE O/P EST LOW 20 MIN: CPT | Performed by: FAMILY MEDICINE

## 2021-08-31 PROCEDURE — 69209 REMOVE IMPACTED EAR WAX UNI: CPT | Performed by: FAMILY MEDICINE

## 2021-08-31 NOTE — PROGRESS NOTES
8/31/2021    This is a 78 y.o. male   Chief Complaint   Patient presents with    Cerumen Impaction     ear irrigation, bilateral   .    HPI  Patient presents today for bilateral ear irrigation for impacted cerumen  Past Medical History:   Diagnosis Date    CAD (coronary artery disease)     Mixed hyperlipidemia 11/26/2018    Prostate cancer ([address] 1/21/2019       Past Surgical History:   Procedure Laterality Date    CARDIAC CATHETERIZATION  12/28/2015    Dr Cali Valenzuela    COLONOSCOPY  1-10-11    CORONARY ARTERY BYPASS GRAFT  12.30.2015    [facility]    DENTAL SURGERY      PROSTATECTOMY N/A 1/28/2019    ROBOTIC LAPAROSCOPIC RADICAL PROSTATECTOMY WITH BILATERAL LYMPH NODE DISSECTION AND RIGHT SIDE NERVE SPARING performed by Donelda Meckel, MD at [address] [place] [address] History     Socioeconomic History    Marital status:      Spouse name: Mary Sullivan    Number of children: 2    Years of education: 15    Highest education level: Not on file   Occupational History    Not on file   Tobacco Use    Smoking status: Never Smoker    Smokeless tobacco: Never Used   Vaping Use    Vaping Use: Never used   Substance and Sexual Activity    Alcohol use: Yes     Alcohol/week: 7.0 standard drinks     Types: 7 Glasses of wine per week     Comment: per week    Drug use: No    Sexual activity: Not Currently   Other Topics Concern    Not on file   Social History Narrative    Not on file     Social Determinants of Health     Financial Resource Strain: Low Risk     Difficulty of Paying Living Expenses: Not hard at all   Food Insecurity: No Food Insecurity    Worried About Running Out of Food in the Last Year: Never true    Ana of Food in the Last Year: Never true   Transportation Needs: No Transportation Needs    Lack of Transportation (Medical): No    Lack of Transportation (Non-Medical):  No   Physical Activity:     Days of Exercise per Week:     Minutes of Exercise per Session:    Stress:     Feeling of 64.2  % Final    Lymphocytes % 08/23/2021 24.7  % Final    Monocytes % 08/23/2021 8.1  % Final    Eosinophils % 08/23/2021 1.9  % Final    Basophils % 08/23/2021 1.1  % Final    Neutrophils Absolute 08/23/2021 4.3  1.7 - 7.7 K/uL Final    Lymphocytes Absolute 08/23/2021 1.7  1.0 - 5.1 K/uL Final    Monocytes Absolute 08/23/2021 0.5  0.0 - 1.3 K/uL Final    Eosinophils Absolute 08/23/2021 0.1  0.0 - 0.6 K/uL Final    Basophils Absolute 08/23/2021 0.1  0.0 - 0.2 K/uL Final    Sodium 08/23/2021 142  136 - 145 mmol/L Final    Potassium 08/23/2021 5.5* 3.5 - 5.1 mmol/L Final    Chloride 08/23/2021 106  99 - 110 mmol/L Final    CO2 08/23/2021 26  21 - 32 mmol/L Final    Anion Gap 08/23/2021 10  3 - 16 Final    Glucose 08/23/2021 106* 70 - 99 mg/dL Final    BUN 08/23/2021 18  7 - 20 mg/dL Final    CREATININE 08/23/2021 0.9  0.8 - 1.3 mg/dL Final    GFR Non- 08/23/2021 >60  >60 Final    Comment: >60 mL/min/1.73m2 EGFR, calc. for ages 25 and older using the  MDRD formula (not corrected for weight), is valid for stable  renal function.  GFR  08/23/2021 >60  >60 Final    Comment: Chronic Kidney Disease: less than 60 ml/min/1.73 sq.m. Kidney Failure: less than 15 ml/min/1.73 sq.m. Results valid for patients 18 years and older.       Calcium 08/23/2021 10.5  8.3 - 10.6 mg/dL Final    Total Protein 08/23/2021 7.2  6.4 - 8.2 g/dL Final    Albumin 08/23/2021 4.3  3.4 - 5.0 g/dL Final    Albumin/Globulin Ratio 08/23/2021 1.5  1.1 - 2.2 Final    Total Bilirubin 08/23/2021 0.5  0.0 - 1.0 mg/dL Final    Alkaline Phosphatase 08/23/2021 71  40 - 129 U/L Final    ALT 08/23/2021 21  10 - 40 U/L Final    AST 08/23/2021 24  15 - 37 U/L Final    Globulin 08/23/2021 2.9  g/dL Final    Cholesterol, Total 08/23/2021 197  0 - 199 mg/dL Final    Triglycerides 08/23/2021 94  0 - 150 mg/dL Final    HDL 08/23/2021 50  40 - 60 mg/dL Final    LDL Calculated 08/23/2021 128* <100 mg/dL Final    VLDL Cholesterol Calculated 08/23/2021 19  Not Established mg/dL Final    TSH 08/23/2021 1.40  0.27 - 4.20 uIU/mL Final    PSA 08/23/2021 0.03  0.00 - 4.00 ng/mL Final       Review of Systems   HENT:        Positive for bilateral cerumen impaction       /84 (Site: Left Upper Arm, Position: Sitting)   Pulse 71   Temp 97.3 °F (36.3 °C) (Infrared)   Resp 18   Wt 181 lb (82.1 kg)   SpO2 99%   BMI 29.66 kg/m²     Physical Exam  Constitutional:       General: He is not in acute distress. Appearance: Normal appearance. HENT:      Head: Atraumatic. Right Ear: There is impacted cerumen. Left Ear: There is impacted cerumen. Pulmonary:      Effort: Pulmonary effort is normal.   Neurological:      Mental Status: He is alert. Psychiatric:         Mood and Affect: Mood normal.         Thought Content: Thought content normal.         Judgment: Judgment normal.         Plan   Diagnosis Orders   1. Bilateral impacted cerumen  UT REMOVAL IMPACTED CERUMEN IRRIGATION/LVG UNILAT       Colace applied to each ear, forceps used to remove impacted cerumen, patient tolerated procedure well. Return if symptoms worsen or fail to improve. Prior to Visit Medications    Medication Sig Taking?  Authorizing Provider   tadalafil (CIALIS) 10 MG tablet Take 1 tablet by mouth as needed for Erectile Dysfunction TAKE 1 TABLET BY MOUTH AS NEEDED FOR ERECTILE DYSFUNCTION Yes Raymond Phan MD   Bacillus Coagulans-Inulin (PROBIOTIC FORMULA PO) Take by mouth  Yes Historical Provider, MD   aspirin 81 MG tablet Take 1 tablet by mouth daily Yes Raymond Phan MD

## 2021-09-29 DIAGNOSIS — E87.5 SERUM POTASSIUM ELEVATED: Primary | ICD-10-CM

## 2021-11-29 ENCOUNTER — ANESTHESIA (OUTPATIENT)
Dept: ENDOSCOPY | Age: 71
End: 2021-11-29
Payer: COMMERCIAL

## 2021-11-29 ENCOUNTER — HOSPITAL ENCOUNTER (OUTPATIENT)
Age: 71
Setting detail: OUTPATIENT SURGERY
Discharge: HOME | End: 2021-11-29
Attending: INTERNAL MEDICINE | Admitting: INTERNAL MEDICINE
Payer: COMMERCIAL

## 2021-11-29 ENCOUNTER — ANESTHESIA EVENT (OUTPATIENT)
Dept: ENDOSCOPY | Age: 71
End: 2021-11-29
Payer: COMMERCIAL

## 2021-11-29 VITALS
DIASTOLIC BLOOD PRESSURE: 81 MMHG | TEMPERATURE: 97 F | BODY MASS INDEX: 28.72 KG/M2 | WEIGHT: 183 LBS | OXYGEN SATURATION: 100 % | HEART RATE: 50 BPM | RESPIRATION RATE: 16 BRPM | HEIGHT: 67 IN | SYSTOLIC BLOOD PRESSURE: 136 MMHG

## 2021-11-29 VITALS
SYSTOLIC BLOOD PRESSURE: 128 MMHG | DIASTOLIC BLOOD PRESSURE: 78 MMHG | RESPIRATION RATE: 16 BRPM | OXYGEN SATURATION: 100 %

## 2021-11-29 PROCEDURE — 6360000002 HC RX W HCPCS: Performed by: NURSE ANESTHETIST, CERTIFIED REGISTERED

## 2021-11-29 PROCEDURE — 2500000003 HC RX 250 WO HCPCS: Performed by: NURSE ANESTHETIST, CERTIFIED REGISTERED

## 2021-11-29 PROCEDURE — 7100000011 HC PHASE II RECOVERY - ADDTL 15 MIN: Performed by: INTERNAL MEDICINE

## 2021-11-29 PROCEDURE — 2580000003 HC RX 258: Performed by: ANESTHESIOLOGY

## 2021-11-29 PROCEDURE — 88305 TISSUE EXAM BY PATHOLOGIST: CPT

## 2021-11-29 PROCEDURE — 2709999900 HC NON-CHARGEABLE SUPPLY: Performed by: INTERNAL MEDICINE

## 2021-11-29 PROCEDURE — 3700000000 HC ANESTHESIA ATTENDED CARE: Performed by: INTERNAL MEDICINE

## 2021-11-29 PROCEDURE — 3700000001 HC ADD 15 MINUTES (ANESTHESIA): Performed by: INTERNAL MEDICINE

## 2021-11-29 PROCEDURE — 2580000003 HC RX 258: Performed by: NURSE ANESTHETIST, CERTIFIED REGISTERED

## 2021-11-29 PROCEDURE — 3609010600 HC COLONOSCOPY POLYPECTOMY SNARE/COLD BIOPSY: Performed by: INTERNAL MEDICINE

## 2021-11-29 PROCEDURE — 7100000010 HC PHASE II RECOVERY - FIRST 15 MIN: Performed by: INTERNAL MEDICINE

## 2021-11-29 RX ORDER — SODIUM CHLORIDE 9 MG/ML
INJECTION, SOLUTION INTRAVENOUS CONTINUOUS
Status: DISCONTINUED | OUTPATIENT
Start: 2021-11-29 | End: 2021-11-29 | Stop reason: HOSPADM

## 2021-11-29 RX ORDER — SODIUM CHLORIDE 9 MG/ML
INJECTION, SOLUTION INTRAVENOUS CONTINUOUS PRN
Status: DISCONTINUED | OUTPATIENT
Start: 2021-11-29 | End: 2021-11-29 | Stop reason: SDUPTHER

## 2021-11-29 RX ORDER — LIDOCAINE HYDROCHLORIDE 20 MG/ML
INJECTION, SOLUTION EPIDURAL; INFILTRATION; INTRACAUDAL; PERINEURAL PRN
Status: DISCONTINUED | OUTPATIENT
Start: 2021-11-29 | End: 2021-11-29 | Stop reason: SDUPTHER

## 2021-11-29 RX ORDER — PROPOFOL 10 MG/ML
INJECTION, EMULSION INTRAVENOUS PRN
Status: DISCONTINUED | OUTPATIENT
Start: 2021-11-29 | End: 2021-11-29 | Stop reason: SDUPTHER

## 2021-11-29 RX ADMIN — PROPOFOL 150 MG: 10 INJECTION, EMULSION INTRAVENOUS at 07:03

## 2021-11-29 RX ADMIN — LIDOCAINE HYDROCHLORIDE 50 MG: 20 INJECTION, SOLUTION EPIDURAL; INFILTRATION; INTRACAUDAL; PERINEURAL at 07:03

## 2021-11-29 RX ADMIN — SODIUM CHLORIDE: 9 INJECTION, SOLUTION INTRAVENOUS at 07:00

## 2021-11-29 RX ADMIN — SODIUM CHLORIDE: 9 INJECTION, SOLUTION INTRAVENOUS at 06:03

## 2021-11-29 ASSESSMENT — PAIN - FUNCTIONAL ASSESSMENT: PAIN_FUNCTIONAL_ASSESSMENT: 0-10

## 2021-11-29 ASSESSMENT — PAIN SCALES - GENERAL
PAINLEVEL_OUTOF10: 0

## 2021-11-29 NOTE — PROGRESS NOTES
Post-procedure education reviewed with patient and visitor, and they verbalized understanding.
Pt ready for discharge - pt discharged in stable condition.
Teaching / education initiated regarding perioperative experience, expectations, and pain management during stay. Patient verbalized understanding.
To endo recovery from procedure room. VSS, awakens to voice, respirations easy and unlabored.
subject to change at any time. Please make sure the visitor has a cell phone that is on,charged and able to accept calls, as this may be the way that the staff communicates with them. Pain management is NO VISITOR policyThe patients ride is expected to remain in the car with a cell phone for communication. If the ride is leaving the hospital grounds please make sure they are back in time for pickup. Have the patient inform the staff on arrival what their rides plans are while the patient is in the facility. At the MAIN there is one visitor allowed. Please note that the visitor policy is subject to change.

## 2021-11-29 NOTE — ANESTHESIA POSTPROCEDURE EVALUATION
Department of Anesthesiology  Postprocedure Note    Patient: Andrea Dale  MRN: [id number]  YOB: 1950  Date of evaluation: 11/29/2021  Time:  8:58 AM     Procedure Summary     Date: 11/29/21 Room / Location: [address] [place] [address]    Anesthesia Start: 0700 Anesthesia Stop: 0720    Procedure: COLONOSCOPY POLYPECTOMY SNARE/COLD BIOPSY (N/A ) Diagnosis: (COLON CANCER SCREENING Z12.11)    Surgeons: Marlon Shah MD Responsible Provider: Chantell Willson MD    Anesthesia Type: MAC ASA Status: 3          Anesthesia Type: MAC    Thiago Phase I: Thiago Score: 10    Thiago Phase II: Thiago Score: 10    Last vitals: Reviewed and per EMR flowsheets. Anesthesia Post Evaluation    Patient location during evaluation: PACU  Patient participation: complete - patient participated  Level of consciousness: awake  Airway patency: patent  Nausea & Vomiting: no vomiting  Complications: no  Cardiovascular status: hemodynamically stable  Respiratory status: acceptable  Hydration status: euvolemic  There was medical reason for not using a multimodal analgesia pain management approach.

## 2021-11-29 NOTE — H&P
Gastroenterology Note                 Pre-operative History and Physical    Patient: Camden Nuñez  : 1950  CSN:     History Obtained From:   Patient or guardian. HISTORY OF PRESENT ILLNESS:    The patient is a 73 y.o. male here for Endoscopy. Past Medical History:    Past Medical History:   Diagnosis Date    CAD (coronary artery disease)     Mixed hyperlipidemia 2018    Prostate cancer ([address] 2019     Past Surgical History:    Past Surgical History:   Procedure Laterality Date    CARDIAC CATHETERIZATION  2015    Dr Boaz Marquez    COLONOSCOPY  1-10-11    CORONARY ART[place]RAFT  2015    [place]    DENTAL SURGERY      PROSTATECTOMY N/A 2019    ROBOTIC LAPAROSCOPIC RADICAL PROSTATECTOMY WITH BILATERAL LYMPH NODE DISSECTION AND RIGHT SIDE NERVE SPARING performed by Arnol Ruiz MD at [address] [place] [address]     Medications Prior to Admission:   Current Facility-Administered Medications on File Prior to Encounter   Medication Dose Route Frequency Provider Last Rate Last Admin    lidocaine 1 % (PF) injection 0.1-0.5 mL  0.1-0.5 mL IntraDERmal Once Leon Franco MD         Current Outpatient Medications on File Prior to Encounter   Medication Sig Dispense Refill    aspirin 81 MG tablet Take 1 tablet by mouth daily 90 tablet 3    tadalafil (CIALIS) 10 MG tablet Take 1 tablet by mouth as needed for Erectile Dysfunction TAKE 1 TABLET BY MOUTH AS NEEDED FOR ERECTILE DYSFUNCTION 30 tablet 5    Bacillus Coagulans-Inulin (PROBIOTIC FORMULA PO) Take by mouth           Allergies:  Patient has no known allergies. Social History:   Social History     Tobacco Use    Smoking status: Former Smoker    Smokeless tobacco: Never Used   Substance Use Topics    Alcohol use:  Yes     Alcohol/week: 1.0 standard drink     Types: 1 Glasses of wine per week     Comment: 3-4 days a week     Family History:   Family History   Problem Relation Age of Onset    Heart Disease Father  Stroke Mother     Other Sister        PHYSICAL EXAM:      BP (!) 157/77   Pulse 53   Temp 97.4 °F (36.3 °C) (Temporal)   Resp 16   Ht 5' 7\" (1.702 m)   Wt 183 lb (83 kg)   SpO2 100%   BMI 28.66 kg/m²  I        Heart:  RRR, normal s1s2    Lungs:  CTA and normal effort    Abdomen:   Soft, nt nd. ASSESSMENT AND PLAN:    1. Patient is a 73 y.o. male here for endoscopy with MAC sedation. 2.  Procedure options, risks and benefits reviewed with patient and/or guardian. They express understanding.

## 2021-11-29 NOTE — ANESTHESIA PRE PROCEDURE
Department of Anesthesiology  Preprocedure Note       Name:  Shai Speaker   Age:  73 y.o.  :  1950                                          MRN:  [id number]         Date:  2021      Surgeon: Alis Vigil):  Brennen Keenan MD    Procedure: Procedure(s):  COLONOSCOPY DIAGNOSTIC    Medications prior to admission:   Prior to Admission medications    Medication Sig Start Date End Date Taking?  Authorizing Provider   aspirin 81 MG tablet Take 1 tablet by mouth daily 18  Yes Aniya Gomez MD   tadalafil (CIALIS) 10 MG tablet Take 1 tablet by mouth as needed for Erectile Dysfunction TAKE 1 TABLET BY MOUTH AS NEEDED FOR ERECTILE DYSFUNCTION 19   Ainya Gomez MD   Bacillus Coagulans-Inulin (PROBIOTIC FORMULA PO) Take by mouth     Historical Provider, MD       Current medications:    Current Facility-Administered Medications   Medication Dose Route Frequency Provider Last Rate Last Admin    0.9 % sodium chloride infusion   IntraVENous Continuous Antonieta Denis  mL/hr at 21 0603 New Bag at 21 0603     Facility-Administered Medications Ordered in Other Encounters   Medication Dose Route Frequency Provider Last Rate Last Admin    lidocaine 1 % (PF) injection 0.1-0.5 mL  0.1-0.5 mL IntraDERmal Once Steve Moser MD           Allergies:  No Known Allergies    Problem List:    Patient Active Problem List   Diagnosis Code    Chest pain on exertion R07.9    Exertional chest pain R07.9    ACS (acute coronary syndrome) (HCC) I24.9    Abnormal stress test R94.39    Coronary artery disease involving native coronary artery of native heart without angina pectoris I25.10    S/P CABG x 5 Z95.1    Elevated PSA R97.20    Mixed hyperlipidemia E78.2    Prostate cancer ([facility] C61    Psoas abscess ([facility] K68.12    Leukocytosis D72.829    Fever R50.9    Sepsis due to methicillin susceptible Staphylococcus aureus ([facility] A41.01    History of prostate cancer Z85.46    Status post prostatectomy Z90.79    Coronary artery disease due to lipid rich plaque I25.10, I25.83    Overweight Q89.9    Complicated UTI (urinary tract infection) N39.0    Receiving intravenous antibiotic treatment as outpatient Z79.2    Weight loss counseling, encounter for Z71.3       Past Medical History:        Diagnosis Date    CAD (coronary artery disease)     Mixed hyperlipidemia 11/26/2018    Prostate cancer ([address] 1/21/2019       Past Surgical History:        Procedure Laterality Date    CARDIAC CATHETERIZATION  12/28/2015    Dr Luci Riddle    COLONOSCOPY  1-10-11    CORONARY ARTERY BYPASS GRAFT  12.30.2015    [place]    DENTAL SURGERY      PROSTATECTOMY N/A 1/28/2019    ROBOTIC LAPAROSCOPIC RADICAL PROSTATECTOMY WITH BILATERAL LYMPH NODE DISSECTION AND RIGHT SIDE NERVE SPARING performed by Davion Chaves MD at [address] [place] [address] History:    Social History     Tobacco Use    Smoking status: Former Smoker    Smokeless tobacco: Never Used   Substance Use Topics    Alcohol use: Yes     Alcohol/week: 1.0 standard drink     Types: 1 Glasses of wine per week     Comment: 3-4 days a week                                Counseling given: Not Answered      Vital Signs (Current):   Vitals:    11/29/21 0545   BP: (!) 157/77   Pulse: 53   Resp: 16   Temp: 97.4 °F (36.3 °C)   TempSrc: Temporal   SpO2: 100%   Weight: 183 lb (83 kg)   Height: 5' 7\" (1.702 m)                                              BP Readings from Last 3 Encounters:   11/29/21 (!) 157/77   08/31/21 132/84   08/23/21 138/78       NPO Status: Time of last liquid consumption: 0130                        Time of last solid consumption: 2359                        Date of last liquid consumption: 11/29/21                        Date of last solid food consumption: 11/27/21    BMI:   Wt Readings from Last 3 Encounters:   11/29/21 183 lb (83 kg)   08/31/21 181 lb (82.1 kg)   08/23/21 180 lb (81.6 kg)     Body mass index is 28.66 kg/m².     CBC:   Lab Results   Component Value Date    WBC 6.7 08/23/2021    RBC 5.29 08/23/2021    HGB 15.1 08/23/2021    HCT 45.8 08/23/2021    MCV 86.5 08/23/2021    RDW 14.3 08/23/2021     08/23/2021       CMP:   Lab Results   Component Value Date     08/23/2021    K 5.5 08/23/2021    K 3.7 03/30/2019     08/23/2021    CO2 26 08/23/2021    BUN 18 08/23/2021    CREATININE 0.9 08/23/2021    GFRAA >60 08/23/2021    AGRATIO 1.5 08/23/2021    LABGLOM >60 08/23/2021    GLUCOSE 106 08/23/2021    PROT 7.2 08/23/2021    CALCIUM 10.5 08/23/2021    BILITOT 0.5 08/23/2021    ALKPHOS 71 08/23/2021    AST 24 08/23/2021    ALT 21 08/23/2021       POC Tests: No results for input(s): POCGLU, POCNA, POCK, POCCL, POCBUN, POCHEMO, POCHCT in the last 72 hours. Coags:   Lab Results   Component Value Date    PROTIME 14.3 03/31/2019    INR 1.25 03/31/2019    APTT 25.6 03/31/2019       HCG (If Applicable): No results found for: Pietro Livings, HCG, HCGQUANT     ABGs:   Lab Results   Component Value Date    PHART 7.305 12/30/2015    PO2ART 127 12/30/2015    PSO4TLM 41 12/30/2015    YXN7VEO 20.5 12/30/2015    BEART -6 12/30/2015    S7WKXJFI 98 12/30/2015        Type & Screen (If Applicable):  No results found for: LABABO, LABRH    Drug/Infectious Status (If Applicable):  No results found for: HIV, HEPCAB    COVID-19 Screening (If Applicable):   Lab Results   Component Value Date    COVID19 NOT DETECTED 02/04/2021           Anesthesia Evaluation  Patient summary reviewed no history of anesthetic complications:   Airway: Mallampati: II  TM distance: >3 FB   Neck ROM: full  Mouth opening: > = 3 FB Dental: normal exam         Pulmonary: breath sounds clear to auscultation                            ROS comment: Former smoker   Cardiovascular:  Exercise tolerance: good (>4 METS),   (+) CAD:, CABG/stent (CABG x5 , 6 yrs ago.  denies cardiac symptoms since):, hyperlipidemia        Rhythm: regular  Rate: normal                 ROS comment: Procedure Date  Date: 02/24/2016 Start: 08:55 AM     [facility]  Technical Quality: Adequate visualization     Indications:Coronary artery disease.     Patient Status: Routine     Height: 68 inches Weight: 176 pounds BSA: 1.94 m2 BMI: 26.76 kg/m2      Conclusions      Summary   Normal left ventricle size, wall thickness and systolic function with an   estimated ejection fraction of 55%. No regional wall motion abnormalities are seen. Diastolic filling parameters suggests grade I diastolic dysfunction. Mild mitral and tricuspid regurgitation. Systolic pulmonary artery pressure (SPAP) is normal and estimated at 26 mmHg   (RA pressure 3 mmHg). Signature      ------------------------------------------------------------------   Electronically signed by Laren Pallas MD, [facility], [address]   (Interpreting physician) on 02/24/2016 at 10:43 AM   ------------------------------------------------------------------     Neuro/Psych:      (-) seizures, TIA and CVA           GI/Hepatic/Renal:            ROS comment: Denies gerd sx or n/v today. Endo/Other:    (+) malignancy/cancer (prostate). Abdominal:             Vascular: negative vascular ROS. Other Findings:           Anesthesia Plan      MAC     ASA 3       Induction: intravenous. Anesthetic plan and risks discussed with patient. Plan discussed with CRNA.                   Jesu San MD   11/29/2021

## 2022-04-25 NOTE — ADDENDUM NOTE
Addended by: Kodak Lopez on: 8/23/2021 09:11 AM     Modules accepted: [place] Orders pending to [facility] [employer]. Will need faxed along with OV, demographics, sleep study. WILL NEED CR IN 10 DAYS AFTER SET UP. Do not close message       Alban Banks   4/25/2022  1:16 PM EDT Back to Top        Elaina KATIE would like orders faxed to [facility] [employer]. Orders pending back to [place] to sign. Patient scheduled for 31-90 7/11/22.  Will need close f/u CR in 10 days

## 2022-08-03 ENCOUNTER — TELEPHONE (OUTPATIENT)
Dept: FAMILY MEDICINE CLINIC | Age: 72
End: 2022-08-03

## 2022-08-03 NOTE — TELEPHONE ENCOUNTER
Patient requested physical lab orders to do ahead of his appt.     8/31/2021 last ov    Future Appointments   Date Time Provider Carol Mckenzie   8/23/2022  3:00 PM DO BIBIANA Lewis - [facility]   9/15/2022 10:45 AM MD Melinda Mosley [facility]     Let patient know when orders placed

## 2022-08-03 NOTE — TELEPHONE ENCOUNTER
----- Message from Dani Solitario sent at 8/2/2022  4:24 PM EDT -----  Subject: Appointment Request    Reason for Call: Established Patient Appointment needed: Routine Well   Child    QUESTIONS    Reason for appointment request? No appointments available during search     Additional Information for Provider?  Pt need the next available appt with   Dr. Jermaine Granger for an Annual Wellness Check with the be Well With In   Program and lab work.  ---------------------------------------------------------------------------  --------------  [id number]  [phone]; OK to leave message on voicemail  ---------------------------------------------------------------------------  --------------  SCRIPT ANSWERS  COVID Screen: Luis A

## 2022-08-04 DIAGNOSIS — D72.829 LEUKOCYTOSIS, UNSPECIFIED TYPE: ICD-10-CM

## 2022-08-04 DIAGNOSIS — Z12.5 PROSTATE CANCER SCREENING: Primary | ICD-10-CM

## 2022-08-04 DIAGNOSIS — E78.2 MIXED HYPERLIPIDEMIA: ICD-10-CM

## 2022-08-04 DIAGNOSIS — I25.10 CORONARY ARTERY DISEASE INVOLVING NATIVE CORONARY ARTERY OF NATIVE HEART WITHOUT ANGINA PECTORIS: ICD-10-CM

## 2022-08-23 ENCOUNTER — OFFICE VISIT (OUTPATIENT)
Dept: FAMILY MEDICINE CLINIC | Age: 72
End: 2022-08-23
Payer: COMMERCIAL

## 2022-08-23 VITALS
OXYGEN SATURATION: 97 % | HEIGHT: 67 IN | SYSTOLIC BLOOD PRESSURE: 136 MMHG | HEART RATE: 61 BPM | BODY MASS INDEX: 27.94 KG/M2 | RESPIRATION RATE: 16 BRPM | DIASTOLIC BLOOD PRESSURE: 72 MMHG | TEMPERATURE: 97.3 F | WEIGHT: 178 LBS

## 2022-08-23 DIAGNOSIS — Z00.00 ANNUAL PHYSICAL EXAM: Primary | ICD-10-CM

## 2022-08-23 DIAGNOSIS — Z00.00 ROUTINE MEDICAL EXAM: Primary | ICD-10-CM

## 2022-08-23 DIAGNOSIS — Z12.5 PROSTATE CANCER SCREENING: ICD-10-CM

## 2022-08-23 DIAGNOSIS — Z00.00 ROUTINE MEDICAL EXAM: ICD-10-CM

## 2022-08-23 PROCEDURE — 99397 PER PM REEVAL EST PAT 65+ YR: CPT | Performed by: FAMILY MEDICINE

## 2022-08-23 ASSESSMENT — ENCOUNTER SYMPTOMS
SHORTNESS OF BREATH: 0
COUGH: 1
ABDOMINAL PAIN: 0

## 2022-08-23 ASSESSMENT — PATIENT HEALTH QUESTIONNAIRE - PHQ9
SUM OF ALL RESPONSES TO PHQ QUESTIONS 1-9: 0
2. FEELING DOWN, DEPRESSED OR HOPELESS: 0
SUM OF ALL RESPONSES TO PHQ QUESTIONS 1-9: 0
1. LITTLE INTEREST OR PLEASURE IN DOING THINGS: 0
SUM OF ALL RESPONSES TO PHQ QUESTIONS 1-9: 0
SUM OF ALL RESPONSES TO PHQ QUESTIONS 1-9: 0
SUM OF ALL RESPONSES TO PHQ9 QUESTIONS 1 & 2: 0

## 2022-08-23 NOTE — PROGRESS NOTES
Angelina Martinez  YOB: 1950    Date of Service:  8/23/2022    Chief Complaint:   Angelina Martinez is a 73 y.o. male who presents for complete physical examination. HPI:  HPI  Fasting labs ordered on August 4, 2022 have not yet been done.     Self-testicular exams: Yes  Sexual activity: has sex with females   Last eye exam: 2 months ago,abnormal -left  corneal abrasion   Exercise: walks and elliptical  Seatbelt use: yes  Are You a Spiritual person: yes  Living will: no    Wt Readings from Last 3 Encounters:   08/23/22 178 lb (80.7 kg)   11/29/21 183 lb (83 kg)   08/31/21 181 lb (82.1 kg)     BP Readings from Last 3 Encounters:   08/23/22 136/72   11/29/21 136/81   11/29/21 128/78       Patient Active Problem List   Diagnosis    Chest pain on exertion    Exertional chest pain    ACS (acute coronary syndrome) (HCC)    Abnormal stress test    Coronary artery disease involving native coronary artery of native heart without angina pectoris    S/P CABG x 5    Elevated PSA    Mixed hyperlipidemia    Prostate cancer (HCC)    Psoas abscess (HCC)    Leukocytosis    Fever    Sepsis due to methicillin susceptible Staphylococcus aureus ([facility] [address]    History of prostate cancer    Status post prostatectomy    Coronary artery disease due to lipid rich plaque    Overweight    Complicated UTI (urinary tract infection)    Receiving intravenous antibiotic treatment as outpatient    Weight loss counseling, encounter for       Health Maintenance   Topic Date Due    COVID-19 Vaccine (1) Never done    Shingles vaccine (2 of 3) 08/06/2014    Pneumococcal 65+ years Vaccine (1 - PCV) Never done    Diabetes screen  12/30/2018    Depression Screen  08/23/2022    Prostate Specific Antigen (PSA) Screening or Monitoring  08/23/2022    Flu vaccine (1) 09/01/2022    Lipids  08/23/2026    DTaP/Tdap/Td vaccine (2 - Td or Tdap) 06/13/2029    Colorectal Cancer Screen  11/29/2031    AAA screen  Completed    Hepatitis C screen  Completed Hepatitis A vaccine  Aged Out    Hepatitis B vaccine  Aged Out    Hib vaccine  Aged Out    Meningococcal (ACWY) vaccine  Aged [employer] History   Administered Date(s) Administered    Tdap (Boostrix, Adacel) 2019    Zoster Live (Zostavax) 2014       No Known Allergies  Outpatient Medications Marked as Taking for the 22 encounter (Office Visit) with Melani Seaman, DO   Medication Sig Dispense Refill    tadalafil (CIALIS) 10 MG tablet Take 1 tablet by mouth as needed for Erectile Dysfunction TAKE 1 TABLET BY MOUTH AS NEEDED FOR ERECTILE DYSFUNCTION 30 tablet 5    Bacillus Coagulans-Inulin (PROBIOTIC FORMULA PO) Take by mouth       aspirin 81 MG tablet Take 1 tablet by mouth daily 90 tablet 3       Past Medical History:   Diagnosis Date    CAD (coronary artery disease)     Mixed hyperlipidemia 2018    Prostate cancer ([facility] [address] 2019     Past Surgical History:   Procedure Laterality Date    CARDIAC CATHETERIZATION  2015    Dr Bing Barrett    COLONOSCOPY  1-10-11    COLONOSCOPY N/A 2021    COLONOSCOPY POLYPECTOMY SNARE/COLD BIOPSY performed by Joni Colmenares MD at [facility]  2015    [place]    DENTAL SURGERY      PROSTATECTOMY N/A 2019    ROBOTIC LAPAROSCOPIC RADICAL PROSTATECTOMY WITH BILATERAL LYMPH NODE DISSECTION AND RIGHT SIDE NERVE SPARING performed by Kristina Marshall MD at [address] [place] [address],[address] [facility] History   Problem Relation Age of Onset    Heart Disease Father     Stroke Mother     Other Sister      Social History     Socioeconomic History    Marital status:      Spouse name: Benigno Woods    Number of children: 2    Years of education: 13    Highest education level: Not on file   Occupational History    Not on file   Tobacco Use    Smoking status: Former     Packs/day: 1.00     Years: 10.00     Pack years: 10.00     Types: Cigarettes     Start date: 0     Quit date:      Years since quittin.6 Passive exposure: Never    Smokeless tobacco: Never   Vaping Use    Vaping Use: Never used   Substance and Sexual Activity    Alcohol use: Yes     Alcohol/week: 1.0 standard drink     Types: 1 Glasses of wine per week     Comment: 3-4 days a week    Drug use: No    Sexual activity: Not Currently   Other Topics Concern    Not on file   Social History Narrative    Not on file     Social Determinants of Health     Financial Resource Strain: Low Risk     Difficulty of Paying Living Expenses: Not hard at all   Food Insecurity: No Food Insecurity    Worried About [employer] in the Last Year: Never true    [facility] [employer] in the Last Year: Never true   Transportation Needs: No Transportation Needs    Lack of Transportation (Medical): No    Lack of Transportation (Non-Medical): No   Physical Activity: Not on file   Stress: Not on file   Social Connections: Not on file   Intimate Partner Violence: Not on file   Housing Stability: Unknown    Unable to Pay for Housing in the Last Year: No    Number of Places Lived in the Last Year: Not on file    Unstable Housing in the Last Year: No       Review ofSystems:  Review of Systems   Constitutional:  Negative for fatigue. HENT:  Positive for congestion. Eyes:  Negative for visual disturbance. Respiratory:  Positive for cough (when talking). Negative for shortness of breath. Cardiovascular:  Negative for chest pain. Gastrointestinal:  Negative for abdominal pain. Endocrine: Negative for cold intolerance and heat intolerance. Allergic/Immunologic: Positive for environmental allergies. PhysicalExam:   Vitals:    08/23/22 1458   BP: 136/72   Site: Left Upper Arm   Position: Sitting   Cuff Size: Large Adult   Pulse: 61   Resp: 16   Temp: 97.3 °F (36.3 °C)   TempSrc: Temporal   SpO2: 97%   Weight: 178 lb (80.7 kg)   Height: 5' 6.9\" (1.699 m)     Body mass index is 27.96 kg/m². Physical Exam  Vitals reviewed.    Constitutional:       General: He is not in acute distress. Appearance: Normal appearance. He is well-developed. HENT:      Head: Normocephalic and atraumatic. Right Ear: External ear normal. There is impacted cerumen. Left Ear: External ear normal. There is impacted cerumen. Nose: Nose normal.   Eyes:      Extraocular Movements: Extraocular movements intact. Conjunctiva/sclera: Conjunctivae normal.      Pupils: Pupils are equal, round, and reactive to light. Cardiovascular:      Rate and Rhythm: Normal rate and regular rhythm. Heart sounds: Normal heart sounds. No murmur heard. Pulmonary:      Effort: Pulmonary effort is normal.      Breath sounds: Normal breath sounds. No wheezing. Abdominal:      General: Bowel sounds are normal.      Palpations: Abdomen is soft. Tenderness: There is no abdominal tenderness. Musculoskeletal:         General: Normal range of motion. Cervical back: Normal range of motion. Comments: Bilateral UE/LE normal ROM   Skin:     General: Skin is warm and dry. Comments: 0.25 cm dark flat area on left zygotic area. Neurological:      Mental Status: He is alert and oriented to person, place, and time. Cranial Nerves: No cranial nerve deficit. Sensory: No sensory deficit. Motor: No weakness. Coordination: Coordination normal.      Gait: Gait normal.      Deep Tendon Reflexes: Reflexes are normal and symmetric. Reflexes normal.   Psychiatric:         Behavior: Behavior normal.         Thought Content: Thought content normal.         Judgment: Judgment normal.       Assessment/Plan:   Diagnosis Orders   1. Annual physical exam        2. Prostate cancer screening  PSA Screening        Pt will have fasting labs done today. Return in about 1 year (around 8/23/2023) for Physical Exam.    Prior to Visit Medications    Medication Sig Taking?  Authorizing Provider   tadalafil (CIALIS) 10 MG tablet Take 1 tablet by mouth as needed for Erectile Dysfunction TAKE 1 TABLET BY MOUTH AS NEEDED FOR ERECTILE DYSFUNCTION Yes Boone Perez MD   Bacillus Coagulans-Inulin (PROBIOTIC FORMULA PO) Take by mouth  Yes Historical Provider, MD   aspirin 81 MG tablet Take 1 tablet by mouth daily Yes Boone Perez MD

## 2022-08-24 LAB
CHOLESTEROL, TOTAL: 226 MG/DL (ref 0–199)
GLUCOSE FASTING: 80 MG/DL (ref 70–99)
HDLC SERPL-MCNC: 55 MG/DL (ref 40–60)
LDL CHOLESTEROL CALCULATED: 159 MG/DL
PROSTATE SPECIFIC ANTIGEN: 0.02 NG/ML (ref 0–4)
TRIGL SERPL-MCNC: 60 MG/DL (ref 0–150)
VLDLC SERPL CALC-MCNC: 12 MG/DL

## 2022-08-28 DIAGNOSIS — E78.00 ELEVATED LDL CHOLESTEROL LEVEL: Primary | ICD-10-CM

## 2022-09-29 ENCOUNTER — TELEPHONE (OUTPATIENT)
Dept: FAMILY MEDICINE CLINIC | Age: 72
End: 2022-09-29

## 2022-09-29 NOTE — TELEPHONE ENCOUNTER
Patient's wife called to state that her  had his Be Well physical done in August and it was supposed to be faxed. She reported that he had to add his waist measurement (36 in) and the office had to resubmit it. She said she spoke to the office and this was resubmitted. I see he had a physical was on 08/23/22, however, I don't see record of a Be Well form submitted. Please call her back to advise.

## 2022-09-30 NOTE — TELEPHONE ENCOUNTER
Spoke with Page Traore. He asked for his wife to be contacted regarding the form at [phone] Danielle. Be well forms can not be faxed it would have either been emailed to the patient or he would have had to pick it up. I don't see any communication regarding the form. If they can fax or drop of a new form today I will work on getting it filled out/signed.

## 2023-02-07 ENCOUNTER — OFFICE VISIT (OUTPATIENT)
Dept: DERMATOLOGY | Age: 73
End: 2023-02-07
Payer: COMMERCIAL

## 2023-02-07 DIAGNOSIS — L66.1 LICHEN PLANOPILARIS: ICD-10-CM

## 2023-02-07 DIAGNOSIS — L57.0 AK (ACTINIC KERATOSIS): ICD-10-CM

## 2023-02-07 DIAGNOSIS — L82.1 SK (SEBORRHEIC KERATOSIS): Primary | ICD-10-CM

## 2023-02-07 DIAGNOSIS — D48.5 NEOPLASM OF UNCERTAIN BEHAVIOR OF SKIN: ICD-10-CM

## 2023-02-07 PROCEDURE — 99212 OFFICE O/P EST SF 10 MIN: CPT | Performed by: DERMATOLOGY

## 2023-02-07 PROCEDURE — 1123F ACP DISCUSS/DSCN MKR DOCD: CPT | Performed by: DERMATOLOGY

## 2023-02-07 PROCEDURE — 11103 TANGNTL BX SKIN EA SEP/ADDL: CPT | Performed by: DERMATOLOGY

## 2023-02-07 PROCEDURE — 11102 TANGNTL BX SKIN SINGLE LES: CPT | Performed by: DERMATOLOGY

## 2023-02-07 PROCEDURE — 17000 DESTRUCT PREMALG LESION: CPT | Performed by: DERMATOLOGY

## 2023-02-07 NOTE — PATIENT INSTRUCTIONS

## 2023-02-07 NOTE — PROGRESS NOTES
[facility]  Leon Adler MD  [address]  1950    81 y.o. male     Date of Visit: 2/7/2023    Chief Complaint: skin lesions    History of Present Illness:    1. He reports a couple of persistent growths on the right temporal scalp and left preauricular cheek. 2.  Follow-up for history of multiple facial actinic keratoses-treated successfully with Efudex cream in the past.  He has 1 new scaly lesion on the nose. 3.  Unknown duration of atypical appearing pigmented lesions on the right upper chest and left upper back. 4.  He has a longstanding history of lichen planopilaris of the scalp that remains stable, inactive and asymptomatic. 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 Loves to fly. Review of Systems:  Gen: Feels well, good sense of health. Past Medical History, Family History, Surgical History, Medications and Allergies reviewed.     Past Medical History:   Diagnosis Date    CAD (coronary artery disease)     Mixed hyperlipidemia 11/26/2018    Prostate cancer ([facility] [address] 1/21/2019     Past Surgical History:   Procedure Laterality Date    CARDIAC CATHETERIZATION  12/28/2015    Dr Janet Ramos    COLONOSCOPY  1-10-11    COLONOSCOPY N/A 11/29/2021    COLONOSCOPY POLYPECTOMY SNARE/COLD BIOPSY performed by Maria Elena Sharma MD at [facility]  12.30.2015    [place]    DENTAL SURGERY      PROSTATECTOMY N/A 1/28/2019    ROBOTIC LAPAROSCOPIC RADICAL PROSTATECTOMY WITH BILATERAL LYMPH NODE DISSECTION AND RIGHT SIDE NERVE SPARING performed by Clair Cox MD at [address] [place] [address]       No Known Allergies  Outpatient Medications Marked as Taking for the 2/7/23 encounter (Office Visit) with Governor MD Deyvi   Medication Sig Dispense Refill    tadalafil (CIALIS) 10 MG tablet Take 1 tablet by mouth as needed for Erectile Dysfunction TAKE 1 TABLET BY MOUTH AS NEEDED FOR ERECTILE DYSFUNCTION 30 tablet 5    Bacillus Coagulans-Inulin (PROBIOTIC FORMULA PO) Take by mouth       aspirin 81 MG tablet Take 1 tablet by mouth daily 90 tablet 3       Physical Examination       The following were examined and determined to be normal: Psych/Neuro, Conjunctivae/eyelids, Gums/teeth/lips, Neck, Abdomen, RUE, LUE, RLE, LLE, and Nails/digits. The following were examined and determined to be abnormal: Scalp/hair, Head/face, Breast/axilla/chest, and Back. Well-appearing. 1.  Left preauricular cheek, right anterior temporal scalp, left supraclavicular region with several stuck on appearing verrucous brown papules. 2.  Right nasal dorsum with a keratotic erythematous macule. 3.  A.  Right upper chest with an irregularly shaped pink and brown macule. B. Left upper back with a 3 to 4 mm pink/dark brown macule. 4.  Scalp with multiple round smooth alopecic patches with loss of follicular ostia. There is no perifollicular scaling or erythema. Assessment and Plan     1. SK (seborrheic keratosis)     Reassurance. 2. AK (actinic keratosis) - 1    Cryotherapy was discussed and patient agreed to proceed. Consent was obtained. 1 lesions were treated cryotherapy: right nasal dorsum. 2 cycles of liquid nitrogen applied to each lesion for 5 seconds using a Cry-Ac cryo spray gun. Patient was educated regarding the potential risks of blister formation and discomfort. Wound care was discussed. The patient tolerated the procedure well and there were no immediate complications. 3. Neoplasm of uncertain behavior of skin,   A. Right upper chest -dysplastic nevus versus early melanoma  B. Left upper back-dysplastic nevus versus early melanoma    Discussed possible diagnosis; patient agreeable to proceed with biopsies (written consent obtained). Risks of the procedure were reviewed including discomfort, bleeding, scar and infection.       The area(s) to be biopsied were marked with a surgical pen. Alcohol was used to cleanse the sites. Local anesthesia was acheived with 1% lidocaine with epinephrine. Shave biopsy was performed x 2 using a razor blade. Hemostasis was achieved with aluminum chloride. The wound(s) were dressed with petrolatum and covered with a bandage. Wound care instructions were reviewed. 2 Specimen (s) sent to pathology. The specimen bottles were appropriately labeled. The patient tolerated the procedure well and there were no immediate complications. 4. 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 Return in about 1 year (around 2/7/2024).     --Herve Burr MD

## 2023-08-28 ASSESSMENT — PATIENT HEALTH QUESTIONNAIRE - PHQ9
1. LITTLE INTEREST OR PLEASURE IN DOING THINGS: 0
SUM OF ALL RESPONSES TO PHQ9 QUESTIONS 1 & 2: 0
SUM OF ALL RESPONSES TO PHQ QUESTIONS 1-9: 0
2. FEELING DOWN, DEPRESSED OR HOPELESS: 0
SUM OF ALL RESPONSES TO PHQ QUESTIONS 1-9: 0

## 2023-08-30 ASSESSMENT — PATIENT HEALTH QUESTIONNAIRE - PHQ9
1. LITTLE INTEREST OR PLEASURE IN DOING THINGS: NOT AT ALL
2. FEELING DOWN, DEPRESSED OR HOPELESS: NOT AT ALL
SUM OF ALL RESPONSES TO PHQ9 QUESTIONS 1 & 2: 0

## 2023-09-14 ENCOUNTER — TELEPHONE (OUTPATIENT)
Dept: FAMILY MEDICINE CLINIC | Age: 73
End: 2023-09-14

## 2023-09-14 DIAGNOSIS — I25.10 CORONARY ARTERY DISEASE INVOLVING NATIVE CORONARY ARTERY OF NATIVE HEART WITHOUT ANGINA PECTORIS: ICD-10-CM

## 2023-09-14 DIAGNOSIS — E78.2 MIXED HYPERLIPIDEMIA: ICD-10-CM

## 2023-09-14 DIAGNOSIS — Z12.5 PROSTATE CANCER SCREENING: Primary | ICD-10-CM

## 2023-09-14 NOTE — TELEPHONE ENCOUNTER
Please place lab orders for   Future Appointments  9/14/2023 - 9/13/2025         Date Visit Type Department Provider    9/27/2023  3:00  [address], DO   Appointment Notes:     Be well physical

## 2023-09-15 NOTE — TELEPHONE ENCOUNTER
Spoke with patient. He has been seeing urology, but they said they don't feel like they need to see him anymore unless his PSA number begins to rise again. They have not ordered an updated PSA. Patient had blood work for appointment done at an outside lab and they will be sending results.    Future Appointments   Date Time Provider [address]   9/27/2023  3:00 PM Marie Clayton DO [place] [address] [facility]   2/8/2024 10:00 AM Douglas Noriega MD [facility]

## 2023-09-15 NOTE — TELEPHONE ENCOUNTER
Fasting labs ordered, please let pt know and ask if a urologist is checking his PSA at least yearly (history of prostate CA). Thank you.

## 2023-09-17 DIAGNOSIS — Z12.5 PROSTATE CANCER SCREENING: Primary | ICD-10-CM

## 2023-09-17 NOTE — TELEPHONE ENCOUNTER
I do not see any results from outside in the Media tab and have ordered a PSA. Please let pt know that the PSA has been added as it doesn't seem that urology is checking it. Thank you. Kael Fonseca

## 2023-09-22 ENCOUNTER — PATIENT MESSAGE (OUTPATIENT)
Dept: FAMILY MEDICINE CLINIC | Age: 73
End: 2023-09-22

## 2023-09-27 ENCOUNTER — OFFICE VISIT (OUTPATIENT)
Dept: FAMILY MEDICINE CLINIC | Age: 73
End: 2023-09-27
Payer: COMMERCIAL

## 2023-09-27 VITALS
WEIGHT: 177 LBS | DIASTOLIC BLOOD PRESSURE: 70 MMHG | HEIGHT: 66 IN | TEMPERATURE: 97.1 F | SYSTOLIC BLOOD PRESSURE: 138 MMHG | HEART RATE: 50 BPM | OXYGEN SATURATION: 98 % | BODY MASS INDEX: 28.45 KG/M2

## 2023-09-27 DIAGNOSIS — R07.9 CHEST PAIN ON EXERTION: ICD-10-CM

## 2023-09-27 DIAGNOSIS — C61 PROSTATE CANCER (HCC): ICD-10-CM

## 2023-09-27 DIAGNOSIS — Z00.00 ANNUAL PHYSICAL EXAM: Primary | ICD-10-CM

## 2023-09-27 PROCEDURE — 99397 PER PM REEVAL EST PAT 65+ YR: CPT | Performed by: FAMILY MEDICINE

## 2023-09-27 SDOH — ECONOMIC STABILITY: FOOD INSECURITY: WITHIN THE PAST 12 MONTHS, THE FOOD YOU BOUGHT JUST DIDN'T LAST AND YOU DIDN'T HAVE MONEY TO GET MORE.: NEVER TRUE

## 2023-09-27 SDOH — ECONOMIC STABILITY: INCOME INSECURITY: HOW HARD IS IT FOR YOU TO PAY FOR THE VERY BASICS LIKE FOOD, HOUSING, MEDICAL CARE, AND HEATING?: NOT VERY HARD

## 2023-09-27 SDOH — ECONOMIC STABILITY: FOOD INSECURITY: WITHIN THE PAST 12 MONTHS, YOU WORRIED THAT YOUR FOOD WOULD RUN OUT BEFORE YOU GOT MONEY TO BUY MORE.: NEVER TRUE

## 2023-09-27 ASSESSMENT — ENCOUNTER SYMPTOMS
COLOR CHANGE: 0
BACK PAIN: 0
ABDOMINAL PAIN: 0
SHORTNESS OF BREATH: 0

## 2023-09-27 NOTE — TELEPHONE ENCOUNTER
Called Main lab   It was not loaded into chart b/c they did not have his SS#  It should populate since I provided that for her. She is faxing it over to us now.

## 2023-09-27 NOTE — PROGRESS NOTES
tenderness. Musculoskeletal:         General: Normal range of motion. Cervical back: Normal range of motion. No rigidity or tenderness. Comments: Mildly decreased UE ROM   Lymphadenopathy:      Cervical: No cervical adenopathy. Skin:     General: Skin is warm and dry. Comments: Bilateral dry skin on knees   Neurological:      Mental Status: He is alert and oriented to person, place, and time. Cranial Nerves: No cranial nerve deficit. Sensory: No sensory deficit. Motor: No weakness. Coordination: Coordination normal.      Gait: Gait normal.      Deep Tendon Reflexes: Reflexes are normal and symmetric. Reflexes normal.   Psychiatric:         Behavior: Behavior normal.         Thought Content: Thought content normal.         Judgment: Judgment normal.         Assessment/Plan:   Diagnosis Orders   1. Annual physical exam        2. Prostate cancer (HCC)  PSA, Prostatic Specific Antigen      3. Chest pain on exertion  Felix Madden MD, Cardiology, [facility] in about 1 year (around 9/27/2024) for Physical Exam.    Prior to Visit Medications    Medication Sig Taking?  Authorizing Provider   tadalafil (CIALIS) 10 MG tablet Take 1 tablet by mouth as needed for Erectile Dysfunction TAKE 1 TABLET BY MOUTH AS NEEDED FOR ERECTILE DYSFUNCTION Yes Isabel Bustamante MD   aspirin 81 MG tablet Take 1 tablet by mouth daily Yes Isabel Bustamante MD

## 2023-09-28 ENCOUNTER — TELEPHONE (OUTPATIENT)
Dept: CARDIOLOGY CLINIC | Age: 73
End: 2023-09-28

## 2023-09-28 LAB — PSA SERPL DL<=0.01 NG/ML-MCNC: 0.03 NG/ML (ref 0–4)

## 2023-09-28 NOTE — TELEPHONE ENCOUNTER
VSP if ok with you I can add onto [facility] 10/5 office for 7:45 am, so he is ready for you at 8? Please advise if that would be ok?

## 2023-09-28 NOTE — TELEPHONE ENCOUNTER
Pt having SOB and chest pain upon exertion. Pt last seen in 2019. Pt scheduled in November. Pt would like to be seen sooner. Please advise.

## 2023-10-03 ENCOUNTER — TELEPHONE (OUTPATIENT)
Dept: CARDIOLOGY CLINIC | Age: 73
End: 2023-10-03

## 2023-10-03 ENCOUNTER — OFFICE VISIT (OUTPATIENT)
Dept: CARDIOLOGY CLINIC | Age: 73
End: 2023-10-03
Payer: COMMERCIAL

## 2023-10-03 VITALS
WEIGHT: 178 LBS | OXYGEN SATURATION: 99 % | SYSTOLIC BLOOD PRESSURE: 154 MMHG | BODY MASS INDEX: 28.61 KG/M2 | HEIGHT: 66 IN | HEART RATE: 55 BPM | DIASTOLIC BLOOD PRESSURE: 81 MMHG

## 2023-10-03 DIAGNOSIS — R07.9 CHEST PAIN, UNSPECIFIED TYPE: ICD-10-CM

## 2023-10-03 DIAGNOSIS — I25.110 CORONARY ARTERY DISEASE INVOLVING NATIVE CORONARY ARTERY OF NATIVE HEART WITH UNSTABLE ANGINA PECTORIS (HCC): Primary | ICD-10-CM

## 2023-10-03 DIAGNOSIS — R06.02 SOB (SHORTNESS OF BREATH): ICD-10-CM

## 2023-10-03 DIAGNOSIS — I10 HYPERTENSION, UNSPECIFIED TYPE: ICD-10-CM

## 2023-10-03 DIAGNOSIS — Z95.1 S/P CABG X 5: ICD-10-CM

## 2023-10-03 DIAGNOSIS — E78.2 MIXED HYPERLIPIDEMIA: ICD-10-CM

## 2023-10-03 PROCEDURE — 3077F SYST BP >= 140 MM HG: CPT | Performed by: INTERNAL MEDICINE

## 2023-10-03 PROCEDURE — 93000 ELECTROCARDIOGRAM COMPLETE: CPT | Performed by: INTERNAL MEDICINE

## 2023-10-03 PROCEDURE — 99205 OFFICE O/P NEW HI 60 MIN: CPT | Performed by: INTERNAL MEDICINE

## 2023-10-03 PROCEDURE — 3079F DIAST BP 80-89 MM HG: CPT | Performed by: INTERNAL MEDICINE

## 2023-10-03 RX ORDER — LOSARTAN POTASSIUM 25 MG/1
25 TABLET ORAL DAILY
Qty: 30 TABLET | Refills: 3 | Status: SHIPPED | OUTPATIENT
Start: 2023-10-03

## 2023-10-03 RX ORDER — ROSUVASTATIN CALCIUM 5 MG/1
5 TABLET, COATED ORAL DAILY
Qty: 30 TABLET | Refills: 3 | Status: SHIPPED | OUTPATIENT
Start: 2023-10-03

## 2023-10-03 RX ORDER — NITROGLYCERIN 0.4 MG/1
0.4 TABLET SUBLINGUAL EVERY 5 MIN PRN
Qty: 25 TABLET | Refills: 3 | Status: SHIPPED | OUTPATIENT
Start: 2023-10-03

## 2023-10-03 NOTE — PATIENT INSTRUCTIONS
Your provider has ordered testing for further evaluation. An order/prescription has been included in your paper work. To schedule outpatient testing, contact [phone][facility]

## 2023-10-03 NOTE — PROGRESS NOTES
input(s): \"TROPONINI\" in the last 72 hours. Invalid input(s): \"PRO-BNP\"  No results for input(s): \"CHOL\", \"LDL\", \"HDL\" in the last 72 hours. Invalid input(s): \"TG\"        Assessment:  81 y.o. patient with:     Diagnosis Orders   1. Coronary artery disease involving native coronary artery of native heart with unstable angina pectoris ([address])  EKG 12 lead    Echo 2D w doppler w color complete    NM MYOCARDIAL SPECT REST EXERCISE OR RX    rosuvastatin (CRESTOR) 5 MG tablet      2. SOB (shortness of breath)  EKG 12 lead      3. Chest pain, unspecified type  EKG 12 lead    Echo 2D w doppler w color complete    NM MYOCARDIAL SPECT REST EXERCISE OR RX      4. S/P CABG x 5  Echo 2D w doppler w color complete    NM MYOCARDIAL SPECT REST EXERCISE OR RX    rosuvastatin (CRESTOR) 5 MG tablet      5. Mixed hyperlipidemia  rosuvastatin (CRESTOR) 5 MG tablet      6. Hypertension, unspecified type  losartan (COZAAR) 25 MG tablet              Plan:  1. EKG reviewed  2. Order echocardiogram ~ chest pain   3. Order GXT stress myoview ~ chest pain  4. Continue aspirin 81 mg once daily  5. Discussed statin therapy versus PCKS9 inhibitors in correlation to coronary artery disease   6. Patient given detailed instructions on medication compliance and necessity with routine cardiology follow up   7. Start losartan (Cozaar) 25 mg daily ~ hypertension  8. Start Crestor (Rosuvastatin) 5 mg daily ~ hyperlipidemia  9. Start nitroglycerin 0.4 mg sublingual nitroglycerin tablet ~ angina   ~The proper use and anticipated side effects of nitroglycerine has been carefully explained. Side effect headache, lightheadedness, and dizziness. If you open nitroglycerin container, recommend a refill in 2-3 months. Take 1 sublingual (under tongue) for chest pain as needed. Wait 5 minutes. If chest pain still occurring take a second sublingual tablet. Wait another 5 minutes.  If chest pain still occurring take a final third sublingual tablet and call 911 for

## 2023-10-03 NOTE — TELEPHONE ENCOUNTER
Drug interact between nitroGLYCERIN (NITROSTAT) 0.4 MG SL tablet and tadalafil (CIALIS) 10 MG tablet. Please advise.

## 2023-10-03 NOTE — TELEPHONE ENCOUNTER
1. EKG reviewed  2. Order echocardiogram ~ chest pain   3. Order GXT stress myoview ~ chest pain  4. Continue aspirin 81 mg once daily  5. Discussed statin therapy versus PCKS9 inhibitors in correlation to coronary artery disease   6. Patient given detailed instructions on medication compliance and necessity with routine cardiology follow up   7. Start losartan (Cozaar) 25 mg daily ~ hypertension  8. Start Crestor (Rosuvastatin) 5 mg daily ~ hyperlipidemia  9. Start nitroglycerin 0.4 mg sublingual nitroglycerin tablet ~ angina   ~The proper use and anticipated side effects of nitroglycerine has been carefully explained. Side effect headache, lightheadedness, and dizziness. If you open nitroglycerin container, recommend a refill in 2-3 months. Take 1 sublingual (under tongue) for chest pain as needed. Wait 5 minutes. If chest pain still occurring take a second sublingual tablet. Wait another 5 minutes. If chest pain still occurring take a final third sublingual tablet and call 911 for prompt evaluation. 10. Discussed goal blood pressure 120/80's ~ take blood pressure 4-5 times a week and keep a log of heart rate and blood pressure. 11. Discussed cardiac catheterization to further assess  12.  Follow up based on testing resting

## 2023-10-03 NOTE — TELEPHONE ENCOUNTER
Pt just left his appt with VSP and stopped at his pharmacy. Meds have not been called in. Please send and notify pt when done.  TY

## 2023-10-05 NOTE — TELEPHONE ENCOUNTER
Spoke with pt relayed message per VSP. Spoke with Nickola Goldberg at the pharmacy relayed VSP message.

## 2023-10-09 ENCOUNTER — HOSPITAL ENCOUNTER (OUTPATIENT)
Dept: NUCLEAR MEDICINE | Age: 73
Discharge: HOME | End: 2023-10-09
Payer: COMMERCIAL

## 2023-10-09 ENCOUNTER — HOSPITAL ENCOUNTER (OUTPATIENT)
Dept: NON INVASIVE DIAGNOSTICS | Age: 73
Discharge: HOME | End: 2023-10-09
Payer: COMMERCIAL

## 2023-10-09 DIAGNOSIS — I25.110 CORONARY ARTERY DISEASE INVOLVING NATIVE CORONARY ARTERY OF NATIVE HEART WITH UNSTABLE ANGINA PECTORIS (HCC): ICD-10-CM

## 2023-10-09 DIAGNOSIS — Z95.1 S/P CABG X 5: ICD-10-CM

## 2023-10-09 DIAGNOSIS — R07.9 CHEST PAIN, UNSPECIFIED TYPE: ICD-10-CM

## 2023-10-09 PROCEDURE — A9502 TC99M TETROFOSMIN: HCPCS | Performed by: INTERNAL MEDICINE

## 2023-10-09 PROCEDURE — 3430000000 HC RX DIAGNOSTIC RADIOPHARMACEUTICAL: Performed by: INTERNAL MEDICINE

## 2023-10-09 PROCEDURE — 78452 HT MUSCLE IMAGE SPECT MULT: CPT

## 2023-10-09 PROCEDURE — 6360000002 HC RX W HCPCS: Performed by: INTERNAL MEDICINE

## 2023-10-09 PROCEDURE — 93017 CV STRESS TEST TRACING ONLY: CPT

## 2023-10-09 RX ORDER — REGADENOSON 0.08 MG/ML
0.4 INJECTION, SOLUTION INTRAVENOUS
Status: COMPLETED | OUTPATIENT
Start: 2023-10-09 | End: 2023-10-09

## 2023-10-09 RX ADMIN — TETROFOSMIN 31.2 MILLICURIE: 1.38 INJECTION, POWDER, LYOPHILIZED, FOR SOLUTION INTRAVENOUS at 13:20

## 2023-10-09 RX ADMIN — REGADENOSON 0.4 MG: 0.08 INJECTION, SOLUTION INTRAVENOUS at 13:15

## 2023-10-09 RX ADMIN — TETROFOSMIN 11.2 MILLICURIE: 1.38 INJECTION, POWDER, LYOPHILIZED, FOR SOLUTION INTRAVENOUS at 12:35

## 2023-10-09 NOTE — PROGRESS NOTES
Patient attempted treadmill stress test, patient was on treadmill for 10min unable to reach THR. A lexiscan stress test was completed on this patient. The patient tolerated the procedure well. Awaiting stress imaging at this time.

## 2023-10-10 ENCOUNTER — TELEPHONE (OUTPATIENT)
Dept: CARDIOLOGY CLINIC | Age: 73
End: 2023-10-10

## 2023-10-10 DIAGNOSIS — R94.39 ABNORMAL STRESS TEST: Primary | ICD-10-CM

## 2023-10-10 NOTE — TELEPHONE ENCOUNTER
Contacted the PT with results and follow up. Pt agreed to proceed with cath and EP referral. Please contact PT to schedule EP APPT. Possible junctional ht rhythm, and polymorphic pvc. Order place for [facility].

## 2023-10-10 NOTE — TELEPHONE ENCOUNTER
----- Message from Jabari Major MD sent at 10/9/2023  4:32 PM EDT -----  Let patient know their stress test is abnormal, rec: ht cath w/ dr Gary eVlarde, lets set that up if pt agreeable. If he has any recurrent cp, rec: ER joseph.  Also, extra ht bts noted on ekgs, rec: EP referral for that. Thanks.

## 2023-10-10 NOTE — TELEPHONE ENCOUNTER
Procedure:  [facility]  Doctor:  Dr. Mari Noel  Date:  10/20/23  Time:  8am  Arrival:  6:30am  Reps:  n/a  Anesthesia:  n/a      Spoke with patient. Please have patient arrive to the main entrance of [facility] ([address] [place] [address], [address] [place] [address]) and check in with the registration desk. They will be directed to the Cath Lab. Please call patient regarding medication instructions. Remind patient to be NPO after midnight (8 hours prior). Do not apply lotions/creams on skin the day of procedure. VSP - fyi only.

## 2023-10-10 NOTE — TELEPHONE ENCOUNTER
Pts wife stated that pt needs a procedure. She asked who would do the procedure. I advised her that are interventionist doctors would. She asked if he needed a bypass who would do that and I advised her that we would reach out to that team of doctors. She is concerned about a traveling doctor.

## 2023-10-11 NOTE — TELEPHONE ENCOUNTER
Per patient request will call him on 10/12/23 discuss medication instructions. Patient request I call his spouse at [phone] on Friday after 1 pm to answer her questions.

## 2023-10-12 NOTE — TELEPHONE ENCOUNTER
Called and spoke to patient. Re-instructed on Sharonda's message. He SONIA. Medications okay to take all prescribed medications night prior as his scheduled time. NPO after midnight. He SONIA.

## 2023-10-20 ENCOUNTER — HOSPITAL ENCOUNTER (OUTPATIENT)
Dept: CARDIAC CATH/INVASIVE PROCEDURES | Age: 73
Discharge: HOME | End: 2023-10-20
Attending: INTERNAL MEDICINE | Admitting: INTERNAL MEDICINE
Payer: COMMERCIAL

## 2023-10-20 VITALS
OXYGEN SATURATION: 98 % | RESPIRATION RATE: 13 BRPM | HEART RATE: 47 BPM | BODY MASS INDEX: 28.54 KG/M2 | WEIGHT: 177.6 LBS | HEIGHT: 66 IN | DIASTOLIC BLOOD PRESSURE: 87 MMHG | SYSTOLIC BLOOD PRESSURE: 135 MMHG

## 2023-10-20 LAB
ANION GAP SERPL CALCULATED.3IONS-SCNC: 7 MMOL/L (ref 3–16)
BUN SERPL-MCNC: 18 MG/DL (ref 7–20)
CALCIUM SERPL-MCNC: 9.7 MG/DL (ref 8.3–10.6)
CHLORIDE SERPL-SCNC: 107 MMOL/L (ref 99–110)
CHOLEST SERPL-MCNC: 149 MG/DL (ref 0–199)
CO2 SERPL-SCNC: 26 MMOL/L (ref 21–32)
CREAT SERPL-MCNC: 0.9 MG/DL (ref 0.8–1.3)
DEPRECATED RDW RBC AUTO: 13.9 % (ref 12.4–15.4)
EKG ATRIAL RATE: 46 BPM
EKG ATRIAL RATE: 48 BPM
EKG DIAGNOSIS: NORMAL
EKG DIAGNOSIS: NORMAL
EKG P AXIS: 21 DEGREES
EKG P AXIS: 28 DEGREES
EKG P-R INTERVAL: 158 MS
EKG P-R INTERVAL: 164 MS
EKG Q-T INTERVAL: 458 MS
EKG Q-T INTERVAL: 460 MS
EKG QRS DURATION: 96 MS
EKG QRS DURATION: 98 MS
EKG QTC CALCULATION (BAZETT): 402 MS
EKG QTC CALCULATION (BAZETT): 409 MS
EKG R AXIS: -11 DEGREES
EKG R AXIS: -12 DEGREES
EKG T AXIS: -15 DEGREES
EKG T AXIS: -18 DEGREES
EKG VENTRICULAR RATE: 46 BPM
EKG VENTRICULAR RATE: 48 BPM
GFR SERPLBLD CREATININE-BSD FMLA CKD-EPI: >60 ML/MIN/{1.73_M2}
GLUCOSE SERPL-MCNC: 121 MG/DL (ref 70–99)
HCT VFR BLD AUTO: 43.1 % (ref 40.5–52.5)
HDLC SERPL-MCNC: 52 MG/DL (ref 40–60)
HGB BLD-MCNC: 14.6 G/DL (ref 13.5–17.5)
LDLC SERPL CALC-MCNC: 80 MG/DL
MCH RBC QN AUTO: 29.3 PG (ref 26–34)
MCHC RBC AUTO-ENTMCNC: 34 G/DL (ref 31–36)
MCV RBC AUTO: 86.2 FL (ref 80–100)
PLATELET # BLD AUTO: 188 K/UL (ref 135–450)
PMV BLD AUTO: 8.1 FL (ref 5–10.5)
POTASSIUM SERPL-SCNC: 4.8 MMOL/L (ref 3.5–5.1)
RBC # BLD AUTO: 5 M/UL (ref 4.2–5.9)
SODIUM SERPL-SCNC: 140 MMOL/L (ref 136–145)
TRIGL SERPL-MCNC: 86 MG/DL (ref 0–150)
VLDLC SERPL CALC-MCNC: 17 MG/DL
WBC # BLD AUTO: 5.8 K/UL (ref 4–11)

## 2023-10-20 PROCEDURE — 93458 L HRT ARTERY/VENTRICLE ANGIO: CPT

## 2023-10-20 PROCEDURE — 2580000003 HC RX 258: Performed by: INTERNAL MEDICINE

## 2023-10-20 PROCEDURE — 2709999900 HC NON-CHARGEABLE SUPPLY: Performed by: INTERNAL MEDICINE

## 2023-10-20 PROCEDURE — C1887 CATHETER, GUIDING: HCPCS | Performed by: INTERNAL MEDICINE

## 2023-10-20 PROCEDURE — 2580000003 HC RX 258

## 2023-10-20 PROCEDURE — 6360000002 HC RX W HCPCS: Performed by: INTERNAL MEDICINE

## 2023-10-20 PROCEDURE — 92978 ENDOLUMINL IVUS OCT C 1ST: CPT | Performed by: INTERNAL MEDICINE

## 2023-10-20 PROCEDURE — C1874 STENT, COATED/COV W/DEL SYS: HCPCS | Performed by: INTERNAL MEDICINE

## 2023-10-20 PROCEDURE — 93459 L HRT ART/GRFT ANGIO: CPT

## 2023-10-20 PROCEDURE — 93010 ELECTROCARDIOGRAM REPORT: CPT | Performed by: INTERNAL MEDICINE

## 2023-10-20 PROCEDURE — 80061 LIPID PANEL: CPT

## 2023-10-20 PROCEDURE — 92978 ENDOLUMINL IVUS OCT C 1ST: CPT

## 2023-10-20 PROCEDURE — 93005 ELECTROCARDIOGRAM TRACING: CPT | Performed by: INTERNAL MEDICINE

## 2023-10-20 PROCEDURE — C1769 GUIDE WIRE: HCPCS | Performed by: INTERNAL MEDICINE

## 2023-10-20 PROCEDURE — C9601 PERC DRUG-EL COR STENT BRAN: HCPCS

## 2023-10-20 PROCEDURE — 92921 HC PRQ CARDIAC ANGIO ADDL ART: CPT

## 2023-10-20 PROCEDURE — 92928 PRQ TCAT PLMT NTRAC ST 1 LES: CPT | Performed by: INTERNAL MEDICINE

## 2023-10-20 PROCEDURE — 99152 MOD SED SAME PHYS/QHP 5/>YRS: CPT | Performed by: INTERNAL MEDICINE

## 2023-10-20 PROCEDURE — C1753 CATH, INTRAVAS ULTRASOUND: HCPCS | Performed by: INTERNAL MEDICINE

## 2023-10-20 PROCEDURE — C9600 PERC DRUG-EL COR STENT SING: HCPCS

## 2023-10-20 PROCEDURE — 6360000002 HC RX W HCPCS

## 2023-10-20 PROCEDURE — 6370000000 HC RX 637 (ALT 250 FOR IP)

## 2023-10-20 PROCEDURE — C1725 CATH, TRANSLUMIN NON-LASER: HCPCS | Performed by: INTERNAL MEDICINE

## 2023-10-20 PROCEDURE — 93459 L HRT ART/GRFT ANGIO: CPT | Performed by: INTERNAL MEDICINE

## 2023-10-20 PROCEDURE — 85027 COMPLETE CBC AUTOMATED: CPT

## 2023-10-20 PROCEDURE — C1894 INTRO/SHEATH, NON-LASER: HCPCS | Performed by: INTERNAL MEDICINE

## 2023-10-20 PROCEDURE — 80048 BASIC METABOLIC PNL TOTAL CA: CPT

## 2023-10-20 PROCEDURE — 2500000003 HC RX 250 WO HCPCS

## 2023-10-20 RX ORDER — ONDANSETRON 2 MG/ML
4 INJECTION INTRAMUSCULAR; INTRAVENOUS EVERY 6 HOURS PRN
Status: DISCONTINUED | OUTPATIENT
Start: 2023-10-20 | End: 2023-10-20 | Stop reason: HOSPADM

## 2023-10-20 RX ORDER — MORPHINE SULFATE 2 MG/ML
2 INJECTION, SOLUTION INTRAMUSCULAR; INTRAVENOUS
Status: DISCONTINUED | OUTPATIENT
Start: 2023-10-20 | End: 2023-10-20 | Stop reason: HOSPADM

## 2023-10-20 RX ORDER — SODIUM CHLORIDE 0.9 % (FLUSH) 0.9 %
5-40 SYRINGE (ML) INJECTION PRN
Status: DISCONTINUED | OUTPATIENT
Start: 2023-10-20 | End: 2023-10-20 | Stop reason: HOSPADM

## 2023-10-20 RX ORDER — ACETAMINOPHEN 325 MG/1
650 TABLET ORAL EVERY 4 HOURS PRN
Status: DISCONTINUED | OUTPATIENT
Start: 2023-10-20 | End: 2023-10-20 | Stop reason: HOSPADM

## 2023-10-20 RX ORDER — MIDAZOLAM HYDROCHLORIDE 1 MG/ML
INJECTION INTRAMUSCULAR; INTRAVENOUS
Status: COMPLETED | OUTPATIENT
Start: 2023-10-20 | End: 2023-10-20

## 2023-10-20 RX ORDER — SODIUM CHLORIDE 9 MG/ML
INJECTION, SOLUTION INTRAVENOUS PRN
Status: DISCONTINUED | OUTPATIENT
Start: 2023-10-20 | End: 2023-10-20 | Stop reason: HOSPADM

## 2023-10-20 RX ORDER — MORPHINE SULFATE 4 MG/ML
4 INJECTION, SOLUTION INTRAMUSCULAR; INTRAVENOUS
Status: DISCONTINUED | OUTPATIENT
Start: 2023-10-20 | End: 2023-10-20 | Stop reason: HOSPADM

## 2023-10-20 RX ORDER — FENTANYL CITRATE 50 UG/ML
INJECTION, SOLUTION INTRAMUSCULAR; INTRAVENOUS
Status: COMPLETED | OUTPATIENT
Start: 2023-10-20 | End: 2023-10-20

## 2023-10-20 RX ORDER — ATROPINE SULFATE 0.4 MG/ML
0.5 INJECTION, SOLUTION INTRAVENOUS
Status: DISCONTINUED | OUTPATIENT
Start: 2023-10-20 | End: 2023-10-20 | Stop reason: HOSPADM

## 2023-10-20 RX ORDER — LORAZEPAM 0.5 MG/1
0.5 TABLET ORAL
Status: DISCONTINUED | OUTPATIENT
Start: 2023-10-20 | End: 2023-10-20 | Stop reason: HOSPADM

## 2023-10-20 RX ORDER — ASPIRIN 325 MG
325 TABLET ORAL ONCE
Status: DISCONTINUED | OUTPATIENT
Start: 2023-10-20 | End: 2023-10-20 | Stop reason: HOSPADM

## 2023-10-20 RX ORDER — FENTANYL CITRATE 50 UG/ML
25 INJECTION, SOLUTION INTRAMUSCULAR; INTRAVENOUS
Status: DISCONTINUED | OUTPATIENT
Start: 2023-10-20 | End: 2023-10-20 | Stop reason: HOSPADM

## 2023-10-20 RX ORDER — SODIUM CHLORIDE 9 MG/ML
INJECTION, SOLUTION INTRAVENOUS CONTINUOUS
Status: ACTIVE | OUTPATIENT
Start: 2023-10-20 | End: 2023-10-20

## 2023-10-20 RX ORDER — SODIUM CHLORIDE 0.9 % (FLUSH) 0.9 %
5-40 SYRINGE (ML) INJECTION EVERY 12 HOURS SCHEDULED
Status: DISCONTINUED | OUTPATIENT
Start: 2023-10-20 | End: 2023-10-20 | Stop reason: HOSPADM

## 2023-10-20 RX ADMIN — SODIUM CHLORIDE: 9 INJECTION, SOLUTION INTRAVENOUS at 10:42

## 2023-10-20 RX ADMIN — FENTANYL CITRATE 50 MCG: 50 INJECTION, SOLUTION INTRAMUSCULAR; INTRAVENOUS at 08:19

## 2023-10-20 RX ADMIN — MIDAZOLAM HYDROCHLORIDE 1 MG: 1 INJECTION INTRAMUSCULAR; INTRAVENOUS at 08:18

## 2023-10-20 RX ADMIN — BIVALIRUDIN 1.75 MG/KG/HR: 250 INJECTION, POWDER, LYOPHILIZED, FOR SOLUTION INTRAVENOUS at 08:51

## 2023-10-20 RX ADMIN — MIDAZOLAM HYDROCHLORIDE 0.5 MG: 1 INJECTION INTRAMUSCULAR; INTRAVENOUS at 08:59

## 2023-10-20 RX ADMIN — FENTANYL CITRATE 25 MCG: 50 INJECTION, SOLUTION INTRAMUSCULAR; INTRAVENOUS at 08:59

## 2023-10-20 NOTE — POST SEDATION
Patient:  Romero Choi   :   1950    A pre-sedation re-evaluation was performed immediately at the end of the procedure. Procedure:  Cardiac cath  Medications: Procedural sedation with minimal conscious sedation  Complications: None  Estimated Blood Loss: none  Specimens: Were not obtained        [place] Medication and Procedural Reconciliation:  I agree that the documented medications and procedures performed are true. The medications were given under my order. The procedures were performed under my direct supervision.

## 2023-10-20 NOTE — PROGRESS NOTES
Patient up and dressed. All belongings gathered. IV removed. Radial site remained unremarkable, in armboard with tegaderm/gauze. Patient wheeled to front lobby where wife arrived w/ car.

## 2023-10-20 NOTE — DISCHARGE INSTRUCTIONS
Cath Labs at  [facility]   Discharge Instructions        10/20/2023  Lilian Castillo   Date of Birth 1950       Activity:  No driving for 24 hours. In 24 hours you may remove dressing and shower, wash site gently with soap and water and leave open to air  Avoid submerging your arm in sitting water for 5 days. Do not use your right hand for 24 hours, then  No lifting more than 5 pounds for 5 days. No lotions, powders, or ointments near site for 5 days. No work/school for 5 days unless instructed otherwise by your cardiologist.    Diet:   Resume previous diet, if a cardiac diet is specified you will receive a handout with  general guidelines. Drink extra non-alcoholic/decaffienated fluids for first 24 hours after your procedure. Arm Management:  If bleeding occurs from the site or a hematoma (lump) begins to increase in size, apply pressure directly over the site, call 911 to return to the hospital.    Special Instructions:  Report any coolness or numbness in the arm  Report any chills, fever, itching, red bumps or rash   Report any of the following to the MD: drainage from the site, redness and/or swelling at the site, increased tenderness at the site   If you are currently taking Metformin or Metformin combination medications for Diabetes, hold your dose for 48 hours after your procedure. Consult your Cardiologist before taking any NSAIDS, vitamin supplements, estrogen, or estrogen plus progestin. Do not stop taking Plavix, Brilinta or Effient, without first consulting your cardiologist.    Sedation Discharge Instructions: For the next 24 hours do not drive a car, operate machinery, power tools or kitchen appliances. Do not drink alcohol; including beer or wine. Do not make any important decisions or sign any important papers. For the next 24 hours you can expect drowsiness, light-headed or dizziness, nausea/ vomiting, inability to concentrate, fatigue and desire to sleep.   We strongly

## 2023-10-20 NOTE — FLOWSHEET NOTE
Pt educated prior to discharge on stents, meds, cardiac rehab and follow up plan of care for same day PCI / radial discharge to home. Pt has copy of discharge instructions and education folder with above information.

## 2023-10-20 NOTE — H&P
[address]   Cardiovascular Evaluation    PATIENT: Addie Randle  DATE: 10/3/2023  MRN: [id number]  CSN: [id number]  : 1950    Primary Care Doctor: Mart Eason DO    Reason for evaluation:   Follow-up, Coronary Artery Disease, Hyperlipidemia, Shortness of Breath (With moving around. ), Neck Pain (Ache in his throat when moving around. ), Chest Pain (Chest to the throat is an ache. ), and Dizziness (Slight dizziness. )      History of present illness on initial date of evaluation:   Addie Randle is a 81 y.o. patient who presents who presents for follow up. He has a past medical history including CAD, s/p CABG x 5, and HLD. He had a left heart catheterization 2015 which showed multivessel CAD, not favorable of PCI. He therefore underwent underwent CABG x 5 with  on 2015. He had a GXT plain stress test 10/25/2016 which was normal. His echocardiogram 2016 EF 55%, mild MR, and mild TR. He has been following with his PCP Mart Eason DO. Today he reports two weeks ago he had sudden onset of chest aching with exertional activity such as running. This is new in nature and concerning to him. He states this is different than his prior cardiac symptom in , as this was pressure and heaviness to his chest. He states over last few weeks this has progressed. Reports he has lost track of time in regards to follow up and he wants as few medications as possible. He has been taking aspirin 81 mg daily consistently over the last few weeks. He states he is reluctant to taking statins in correlated with affecting liver. He states BP at home 356 systolic on average.      Patient Active Problem List   Diagnosis    Chest pain on exertion    Chest pain    ACS (acute coronary syndrome) (HCC)    Abnormal stress test    Coronary artery disease involving native coronary artery of native heart without angina pectoris    S/P CABG x 5    Elevated PSA    Mixed down trending   ~EKG as clinically needed  5. Keep NPO   6. Pre-cath orders will be placed. All questions and concerns were addressed to the patient/family. Alternatives to my treatment were discussed. The note was completed using EMR. Every effort was made to ensure accuracy; however, inadvertent computerized transcription errors may be present.     Brent Appiah MD, [address] [place], [place] [phone] [facility] office  [phone] [facility]  10/3/2023  1:39 PM

## 2023-10-25 ENCOUNTER — TELEPHONE (OUTPATIENT)
Dept: CARDIOLOGY CLINIC | Age: 73
End: 2023-10-25

## 2023-10-25 DIAGNOSIS — Z95.5 H/O HEART ARTERY STENT: Primary | ICD-10-CM

## 2023-10-25 NOTE — TELEPHONE ENCOUNTER
Pt had stent placed 10/20/23. Pt was prescribed Nitro before procedure, he never did take any but just in case he needed it he could not take Cialis. Pt wants to know can he take Cialis now. Pt wants to know if referral was put in for cardiac rehab and will they call him to set up. Pt wants to know can he start doing exercises at home, mostly walking, little running. Please advise.

## 2023-10-27 ENCOUNTER — HOSPITAL ENCOUNTER (OUTPATIENT)
Dept: CARDIOLOGY | Age: 73
Discharge: HOME | End: 2023-10-27
Payer: COMMERCIAL

## 2023-10-27 DIAGNOSIS — R07.9 CHEST PAIN, UNSPECIFIED TYPE: ICD-10-CM

## 2023-10-27 DIAGNOSIS — I25.110 CORONARY ARTERY DISEASE INVOLVING NATIVE CORONARY ARTERY OF NATIVE HEART WITH UNSTABLE ANGINA PECTORIS (HCC): ICD-10-CM

## 2023-10-27 DIAGNOSIS — Z95.1 S/P CABG X 5: ICD-10-CM

## 2023-10-27 PROCEDURE — 93306 TTE W/DOPPLER COMPLETE: CPT

## 2023-10-27 ASSESSMENT — EJECTION FRACTION: EF_VALUE: 53

## 2023-10-27 NOTE — TELEPHONE ENCOUNTER
Would not recommend Cilais at this time. Go ahead and put a cardiac rehab referral if that has not been placed yet. They typically call the patient to set up. Patient can do mild to moderate amount of activity at home.   Comfortable with walking but would not have him start running or any kind of strenuous activity until he is seen in follow-up

## 2023-10-30 ENCOUNTER — TELEPHONE (OUTPATIENT)
Dept: CARDIOLOGY CLINIC | Age: 73
End: 2023-10-30

## 2023-10-30 NOTE — TELEPHONE ENCOUNTER
----- Message from Juanis Mcneal MD sent at 10/27/2023 11:07 AM EDT -----  The test shows normal heart strength and all in line.

## 2023-10-31 ENCOUNTER — HOSPITAL ENCOUNTER (OUTPATIENT)
Dept: CARDIAC REHAB | Age: 73
Setting detail: THERAPIES SERIES
Discharge: HOME | End: 2023-10-31
Payer: COMMERCIAL

## 2023-10-31 VITALS — WEIGHT: 180 LBS | BODY MASS INDEX: 28.93 KG/M2 | HEIGHT: 66 IN

## 2023-10-31 PROCEDURE — 93798 PHYS/QHP OP CAR RHAB W/ECG: CPT

## 2023-10-31 ASSESSMENT — LIFESTYLE VARIABLES: SMOKELESS_TOBACCO: NO

## 2023-10-31 ASSESSMENT — PATIENT HEALTH QUESTIONNAIRE - PHQ9
10. IF YOU CHECKED OFF ANY PROBLEMS, HOW DIFFICULT HAVE THESE PROBLEMS MADE IT FOR YOU TO DO YOUR WORK, TAKE CARE OF THINGS AT HOME, OR GET ALONG WITH OTHER PEOPLE: 0
SUM OF ALL RESPONSES TO PHQ QUESTIONS 1-9: 0
SUM OF ALL RESPONSES TO PHQ QUESTIONS 1-9: 0
9. THOUGHTS THAT YOU WOULD BE BETTER OFF DEAD, OR OF HURTING YOURSELF: 0
1. LITTLE INTEREST OR PLEASURE IN DOING THINGS: 0
3. TROUBLE FALLING OR STAYING ASLEEP: 0
2. FEELING DOWN, DEPRESSED OR HOPELESS: 0
6. FEELING BAD ABOUT YOURSELF - OR THAT YOU ARE A FAILURE OR HAVE LET YOURSELF OR YOUR FAMILY DOWN: 0
5. POOR APPETITE OR OVEREATING: 0
7. TROUBLE CONCENTRATING ON THINGS, SUCH AS READING THE NEWSPAPER OR WATCHING TELEVISION: 0
SUM OF ALL RESPONSES TO PHQ QUESTIONS 1-9: 0
SUM OF ALL RESPONSES TO PHQ QUESTIONS 1-9: 0
SUM OF ALL RESPONSES TO PHQ9 QUESTIONS 1 & 2: 0
4. FEELING TIRED OR HAVING LITTLE ENERGY: 0
8. MOVING OR SPEAKING SO SLOWLY THAT OTHER PEOPLE COULD HAVE NOTICED. OR THE OPPOSITE, BEING SO FIGETY OR RESTLESS THAT YOU HAVE BEEN MOVING AROUND A LOT MORE THAN USUAL: 0

## 2023-10-31 ASSESSMENT — EXERCISE STRESS TEST
PEAK_BP: 158/80
PEAK_HR: 90

## 2023-10-31 NOTE — PLAN OF CARE
Cardiopulmonary Rehab Treatment Plan   Name: Bonnie Browning Assessment Date: 10/31/2023   : 1950 Primary Diagnosis: Treatment Diagnosis 1: PCI      Age: 81 y.o. Referring Physician:  Liz Stalin   MRN: [id number] Primary Care Physician: Diony Paulino DO   Treatment Diagnosis  Treatment Diagnosis 1: PCI  PCI Date: 10/21/23  Referral Date: 10/27/23  Significant Cardiovascular History: Previous CABG  Co-morbidities: Metastatic cancer (prostate)    Individual Treatment Plan  ITP Visit Type: Initial assessment  1st Date of Exercise : 10/31/23  ITP Next Review Date:  (ITP sent)  Visit #/Total Visits:   EF%: 53 %  ITP: Exercise; Psychosocial; Nutrition; Education    Exercise   Stages of Change: Preparation  Barrera Total Score: 0  Test: Six minute walk test    Data Measured Before Walk  Heart Rate: 59  Blood Pressure: 150/78  O2 Saturation: 97    Data Measured during Walk  Indicate Mode of RPE: 6 minutes/submax  RPE Data Measured: Post    Data Measured Immediately After Walk  Distance Walked in : ft  Distance Walked (ft): 1720 ft  Peak Heart Rate: 90  Peak Blood Pressure: 158/80  RPE: 7  O2 Saturation: 97    Data Measured at 5 Minutes After Walk  Heart Rate: 58  Blood Pressure: 128/68  SpO2: 98 %    Exercise Prescription  Mode: Track; Treadmill; Bike; Stepper; Ergometer; Elliptical; Rower  Frequency per week: 2-3 supervised sessions weekly  Duration Per Session: 20-36+ minutes of steady aerobic exercise  Intensity METS      : 3-6 (Increase workloads 0.5-1.0 METS/weekly)  RPE: 11-14  Progression: Start w/1-2 sets of 8-12 repetitions for ea. muscle group. Use1-5# initially (very light resistance). Resistance Training: Yes    Exercise Activity at Home  Type: walking, ellipitical  Frequency: patient will be walking 1 mile three times. Duration: 30+ minutes a day.   Resistance Training: No    Exercise Education  Education: Exercise safety; Signs/symptoms to report; RPE scale    Psychosocial  Stages of

## 2023-11-01 ENCOUNTER — HOSPITAL ENCOUNTER (OUTPATIENT)
Dept: CARDIAC REHAB | Age: 73
Setting detail: THERAPIES SERIES
End: 2023-11-01
Payer: COMMERCIAL

## 2023-11-03 ENCOUNTER — HOSPITAL ENCOUNTER (OUTPATIENT)
Dept: CARDIAC REHAB | Age: 73
Setting detail: THERAPIES SERIES
Discharge: HOME | End: 2023-11-03
Payer: COMMERCIAL

## 2023-11-03 PROCEDURE — 93798 PHYS/QHP OP CAR RHAB W/ECG: CPT

## 2023-11-06 ENCOUNTER — HOSPITAL ENCOUNTER (OUTPATIENT)
Dept: CARDIAC REHAB | Age: 73
Setting detail: THERAPIES SERIES
Discharge: HOME | End: 2023-11-06
Payer: COMMERCIAL

## 2023-11-06 PROCEDURE — 93798 PHYS/QHP OP CAR RHAB W/ECG: CPT

## 2023-11-08 ENCOUNTER — HOSPITAL ENCOUNTER (OUTPATIENT)
Dept: CARDIAC REHAB | Age: 73
Setting detail: THERAPIES SERIES
Discharge: HOME | End: 2023-11-08
Payer: COMMERCIAL

## 2023-11-08 PROCEDURE — 93798 PHYS/QHP OP CAR RHAB W/ECG: CPT

## 2023-11-10 ENCOUNTER — HOSPITAL ENCOUNTER (OUTPATIENT)
Dept: CARDIAC REHAB | Age: 73
Setting detail: THERAPIES SERIES
Discharge: HOME | End: 2023-11-10
Payer: COMMERCIAL

## 2023-11-10 PROCEDURE — 93798 PHYS/QHP OP CAR RHAB W/ECG: CPT

## 2023-11-13 ENCOUNTER — HOSPITAL ENCOUNTER (OUTPATIENT)
Dept: CARDIAC REHAB | Age: 73
Setting detail: THERAPIES SERIES
Discharge: HOME | End: 2023-11-13
Payer: COMMERCIAL

## 2023-11-13 PROCEDURE — 93798 PHYS/QHP OP CAR RHAB W/ECG: CPT

## 2023-11-15 ENCOUNTER — HOSPITAL ENCOUNTER (OUTPATIENT)
Dept: CARDIAC REHAB | Age: 73
Setting detail: THERAPIES SERIES
Discharge: HOME | End: 2023-11-15
Payer: COMMERCIAL

## 2023-11-15 PROCEDURE — 93798 PHYS/QHP OP CAR RHAB W/ECG: CPT

## 2023-11-17 ENCOUNTER — HOSPITAL ENCOUNTER (OUTPATIENT)
Dept: CARDIAC REHAB | Age: 73
Setting detail: THERAPIES SERIES
Discharge: HOME | End: 2023-11-17
Payer: COMMERCIAL

## 2023-11-17 PROCEDURE — 93798 PHYS/QHP OP CAR RHAB W/ECG: CPT

## 2023-11-20 ENCOUNTER — HOSPITAL ENCOUNTER (OUTPATIENT)
Dept: CARDIAC REHAB | Age: 73
Setting detail: THERAPIES SERIES
Discharge: HOME | End: 2023-11-20
Payer: COMMERCIAL

## 2023-11-20 PROCEDURE — 93798 PHYS/QHP OP CAR RHAB W/ECG: CPT

## 2023-11-20 NOTE — PROGRESS NOTES
[address]   Cardiovascular Evaluation    PATIENT: Layne Pace  DATE: 2023  MRN: [id number]  CSN: [id number]  : 1950    Primary Care Doctor: Elizabeth Geller DO    Reason for evaluation:   Follow-Up from Hospital, Coronary Artery Disease, Hypertension, and Hyperlipidemia      History of present illness on initial date of evaluation:   Layne Pace is a 72 y.o. patient who presents who presents for follow up. He has a past medical history including CAD, s/p CABG x 5, and HLD. He had a left heart catheterization 2015 which showed multivessel CAD, not favorable of PCI. He therefore underwent underwent CABG x 5 with  on 2015. He had a GXT plain stress test 10/25/2016 which was normal. His echocardiogram 2016 EF 55%, mild MR, and mild TR. He has been following with his PCP Elizabeth Geller DO. Since last office visit patient had an [address] 10/09/23 inferolateral hypokinesis/scar with ignacio infarct ischemia. Therefore he proceeded with elective LHC and RHC on 10/20/23 that resulted in PCI of CIRC w/ MOMO x 3. Noted he was bradycardic, no beta blockers. Echo 10/27/23 EF 50-55%, Grade I DD, mild MR, and mild left atrial enlargement. Today he states he is doing great. Patient currently denies any weight gain, edema, palpitations, chest pain, shortness of breath, dizziness, and syncope. He is taking medications as prescribed, tolerating well. He is participating in cardiac rehab.      Patient Active Problem List   Diagnosis    Chest pain on exertion    Chest pain    ACS (acute coronary syndrome) (HCC)    Abnormal stress test    Coronary artery disease involving native coronary artery of native heart without angina pectoris    S/P CABG x 5    Elevated PSA    Mixed hyperlipidemia    Prostate cancer (HCC)    Psoas abscess (HCC)    Leukocytosis    Fever    Sepsis due to methicillin susceptible Staphylococcus aureus ([address])    History

## 2023-11-21 ENCOUNTER — OFFICE VISIT (OUTPATIENT)
Dept: CARDIOLOGY CLINIC | Age: 73
End: 2023-11-21
Payer: COMMERCIAL

## 2023-11-21 VITALS
BODY MASS INDEX: 28.48 KG/M2 | WEIGHT: 177.2 LBS | HEIGHT: 66 IN | DIASTOLIC BLOOD PRESSURE: 72 MMHG | SYSTOLIC BLOOD PRESSURE: 136 MMHG | HEART RATE: 53 BPM | OXYGEN SATURATION: 98 %

## 2023-11-21 DIAGNOSIS — E78.2 MIXED HYPERLIPIDEMIA: ICD-10-CM

## 2023-11-21 DIAGNOSIS — Z79.899 MEDICATION MANAGEMENT: ICD-10-CM

## 2023-11-21 DIAGNOSIS — Z95.1 S/P CABG X 5: ICD-10-CM

## 2023-11-21 DIAGNOSIS — I25.10 CORONARY ARTERY DISEASE INVOLVING NATIVE CORONARY ARTERY OF NATIVE HEART WITHOUT ANGINA PECTORIS: Primary | ICD-10-CM

## 2023-11-21 PROCEDURE — 99214 OFFICE O/P EST MOD 30 MIN: CPT | Performed by: INTERNAL MEDICINE

## 2023-11-21 PROCEDURE — 3078F DIAST BP <80 MM HG: CPT | Performed by: INTERNAL MEDICINE

## 2023-11-21 PROCEDURE — 3075F SYST BP GE 130 - 139MM HG: CPT | Performed by: INTERNAL MEDICINE

## 2023-11-21 PROCEDURE — 1123F ACP DISCUSS/DSCN MKR DOCD: CPT | Performed by: INTERNAL MEDICINE

## 2023-11-21 RX ORDER — EVOLOCUMAB 140 MG/ML
140 INJECTION, SOLUTION SUBCUTANEOUS
Qty: 2 ADJUSTABLE DOSE PRE-FILLED PEN SYRINGE | Refills: 11 | Status: SHIPPED | OUTPATIENT
Start: 2023-11-21

## 2023-11-21 NOTE — PATIENT INSTRUCTIONS
Plan:  1. Discussed cholesterol LDL not at goal. Goal < 70.   2. Start Repatha 140 mg subcutaneous injection every 14 days   3. Order fasting lipids and liver function testing in 3 months from starting Repatha  4. Continue Brilinta 90 mg twice daily and aspirin 81 mg daily  5.  Follow up in 6 months

## 2023-11-22 ENCOUNTER — HOSPITAL ENCOUNTER (OUTPATIENT)
Dept: CARDIAC REHAB | Age: 73
Setting detail: THERAPIES SERIES
Discharge: HOME | End: 2023-11-22
Payer: COMMERCIAL

## 2023-11-22 PROCEDURE — 93798 PHYS/QHP OP CAR RHAB W/ECG: CPT

## 2023-11-22 NOTE — PLAN OF CARE
Cardiopulmonary Rehab Treatment Plan   Name: Eren Callaway Assessment Date: 2023   : 1950 Primary Diagnosis: Treatment Diagnosis 1: PCI      Age: 72 y.o. Referring Physician:  Solia Nicole   MRN: [id number] Primary Care Physician: Emilee Paez DO   Treatment Diagnosis  Treatment Diagnosis 1: PCI  PCI Date: 10/21/23  Referral Date: 10/27/23  Significant Cardiovascular History: Previous CABG  Co-morbidities: Metastatic cancer (prostate)    Individual Treatment Plan  ITP Visit Type: Re-assessment  1st Date of Exercise : 10/31/23  ITP Next Review Date: 23  Visit #/Total Visits: 10/36  EF%: 53 %  ITP: Exercise; Psychosocial; Nutrition; Education    Exercise   Stages of Change: Preparation  Bruce Total Score: 0  Test: Six minute walk test    Data Measured Before Walk  Heart Rate: 59  Blood Pressure: 150/78  O2 Saturation: 97    Data Measured during Walk  Indicate Mode of RPE: 6 minutes/submax  RPE Data Measured: Post    Data Measured Immediately After Walk  Distance Walked in : ft  Distance Walked (ft): 1720 ft  Peak Heart Rate: 90  Peak Blood Pressure: 158/80  RPE: 7  O2 Saturation: 97    Data Measured at 5 Minutes After Walk  Heart Rate: 58  Blood Pressure: 128/68  SpO2: 98 %    Exercise Prescription  Mode: Track; Treadmill; Bike; Stepper; Ergometer; Elliptical; Rower  Frequency per week: 2-3 supervised sessions weekly  Duration Per Session: 25-42+ minutes of steady aerobic exercise  Intensity METS      : 3-6 (Increase workloads 0.5-1.0 METS/weekly)  RPE: 11-14  Progression: max met level 3.5  Resistance Training: Yes    Exercise Activity at Home  Type: walking, elliptical, hand weights  Frequency: twice a week  Duration: 20-30 min/day  Resistance Training: Yes    Exercise Education  Education: Signs/symptoms to report; RPE scale; Physically active;  Warm up/cool down; Equipment orientation; Self pulse    Psychosocial  Stages of Change: Action  Emeli Total Score: 17  PHQ-9 Total

## 2023-11-24 ENCOUNTER — APPOINTMENT (OUTPATIENT)
Dept: CARDIAC REHAB | Age: 73
End: 2023-11-24
Payer: COMMERCIAL

## 2023-11-27 ENCOUNTER — TELEPHONE (OUTPATIENT)
Dept: CARDIOLOGY CLINIC | Age: 73
End: 2023-11-27

## 2023-11-27 ENCOUNTER — HOSPITAL ENCOUNTER (OUTPATIENT)
Dept: CARDIAC REHAB | Age: 73
Setting detail: THERAPIES SERIES
Discharge: HOME | End: 2023-11-27
Payer: COMMERCIAL

## 2023-11-27 DIAGNOSIS — I25.10 CORONARY ARTERY DISEASE INVOLVING NATIVE CORONARY ARTERY OF NATIVE HEART WITHOUT ANGINA PECTORIS: ICD-10-CM

## 2023-11-27 DIAGNOSIS — Z95.1 S/P CABG X 5: ICD-10-CM

## 2023-11-27 DIAGNOSIS — E78.2 MIXED HYPERLIPIDEMIA: ICD-10-CM

## 2023-11-27 PROCEDURE — 93798 PHYS/QHP OP CAR RHAB W/ECG: CPT

## 2023-11-27 NOTE — TELEPHONE ENCOUNTER
Submitted PA for REPATHA  [facility] Key: [id number] STATUS: PENDING. Follow up done daily; if no response in three days we will refax for status check. If another three days goes by with no response we will call the insurance for status.

## 2023-11-28 RX ORDER — EVOLOCUMAB 140 MG/ML
140 INJECTION, SOLUTION SUBCUTANEOUS
Qty: 2 ADJUSTABLE DOSE PRE-FILLED PEN SYRINGE | Refills: 11 | Status: SHIPPED | OUTPATIENT
Start: 2023-11-28

## 2023-11-29 ENCOUNTER — HOSPITAL ENCOUNTER (OUTPATIENT)
Dept: CARDIAC REHAB | Age: 73
Setting detail: THERAPIES SERIES
Discharge: HOME | End: 2023-11-29
Payer: COMMERCIAL

## 2023-11-29 PROCEDURE — 93798 PHYS/QHP OP CAR RHAB W/ECG: CPT

## 2023-12-01 ENCOUNTER — HOSPITAL ENCOUNTER (OUTPATIENT)
Dept: CARDIAC REHAB | Age: 73
Setting detail: THERAPIES SERIES
Discharge: HOME | End: 2023-12-01
Payer: COMMERCIAL

## 2023-12-01 PROCEDURE — 93798 PHYS/QHP OP CAR RHAB W/ECG: CPT

## 2023-12-04 ENCOUNTER — TELEPHONE (OUTPATIENT)
Dept: CARDIOLOGY CLINIC | Age: 73
End: 2023-12-04

## 2023-12-04 ENCOUNTER — HOSPITAL ENCOUNTER (OUTPATIENT)
Dept: CARDIAC REHAB | Age: 73
Setting detail: THERAPIES SERIES
Discharge: HOME | End: 2023-12-04
Payer: COMMERCIAL

## 2023-12-04 PROCEDURE — 93798 PHYS/QHP OP CAR RHAB W/ECG: CPT

## 2023-12-04 NOTE — TELEPHONE ENCOUNTER
Spoke with pt relayed FXW message. Pt states he has not had any other episodes and was advised if it happens again to go to ER. He made first available [facility] cristino 1/30/2023. Is this time frame ok?

## 2023-12-04 NOTE — TELEPHONE ENCOUNTER
Whether any recurrent symptoms after this episode? If not, we will discuss these symptoms during next office visit which should be in the next 2 to 4 weeks. If he had more episodes since  yesterday, he should be evaluated in the emergency.

## 2023-12-04 NOTE — TELEPHONE ENCOUNTER
Yesterday pt was trying to catch up to his wife who was walking on their property so he jogged about 500 feet, not a hard run. Pt ran out of breath and dropped to his knees, nearly passed out. Recovered after a few minutes. Please advise.

## 2023-12-05 ENCOUNTER — ENROLLMENT (OUTPATIENT)
Dept: PHARMACY | Age: 73
End: 2023-12-05

## 2023-12-05 ENCOUNTER — TELEPHONE (OUTPATIENT)
Dept: PHARMACY | Age: 73
End: 2023-12-05

## 2023-12-05 NOTE — TELEPHONE ENCOUNTER
Specialty Medication Service    Date: 12/5/2023  Patient's Name: Dora Souza YOB: 1950            _____________________________________________________________________________________________    Email received from [address] in regard to current SMS formulary medication, Manlius Sable. Initial SMS override placed. Patient to be transferred for initial assessment once medication shipment is scheduled. Called specialist office (Calvin Pradhan) to request office notes. Notes received and added to media.     Leatha Barrios Misbah  Pharmacy   Specialty Medication Services   Phone: [phone] option 4    For Pharmacy Admin Tracking Only    Program: SMS  CPA in place:  No  Recommendation Provided To: Provider: 1 via Called provider office and Pharmacy: 1  Intervention Detail: Benefit Assistance  Intervention Accepted By: Provider: 1 and Pharmacy: 1  Gap Closed?:     Time Spent (min): 15

## 2023-12-05 NOTE — PROGRESS NOTES
Assessment:     1. Bradycardia and near syncope: Patient with frequent episodes of bradycardia. Seems he is symptomatic at times. His exertional symptoms, of late, are likely due (at least in part) due to chronotropic incompetence. Multiple ECG's show HR in 40's. His cardiac rehab strips show resting bradycardia and exertional heart rates only reaching 90's. He also reports inability to reach target HR during stress treadmill test.    Additionally, based on his CAD and low normal LV systolic function, he should be on beta-blocker therapy to allow for proper treatment of that disease. Unable to take such medication due to limitations presented by bradycardia. Discussed the option of pacemaker implantation to allow for the proper treatment of his heart disease and to protect him from effects of chronotropic incompetence. Also discussed the option of an event monitor to further assess and document his char-arrhythmia issues. Patient wishes to proceed with such a plan (event monitor followed by re-assessment). For now, avoid any negative chronotropic agents. 2. Dyspnea on exertion/CAD: continue current medications. Management as per primary cardiologist.    3. Good overall blood pressure control    Plan:     2 week cardiac event monitor to further assess bradycardia. Discussed the risks and benefits of a pacemaker (literature provided). Follow up with me in 4 - 6 weeks. Subjective:       Patient ID: Logan Fernandez is a 72 y.o. male. Chief Complaint:  Chief Complaint   Patient presents with    New Patient    Palpitations     HPI    Patient is a pleasant 72 y.o. male who presents for evaluation of PVC's. The patient has a past medical history of coronary artery disease, hypertension, hyperlipidemia, prostate cancer, and PVC's. He had a left heart catheterization 12/28/2015 which showed multivessel CAD, not favorable of PCI. He therefore underwent underwent CABG x 5 with  on 12/30/2015.  He had

## 2023-12-06 ENCOUNTER — TELEPHONE (OUTPATIENT)
Dept: CARDIOLOGY CLINIC | Age: 73
End: 2023-12-06

## 2023-12-06 ENCOUNTER — HOSPITAL ENCOUNTER (OUTPATIENT)
Dept: CARDIAC REHAB | Age: 73
Setting detail: THERAPIES SERIES
Discharge: HOME | End: 2023-12-06
Payer: COMMERCIAL

## 2023-12-06 ENCOUNTER — OFFICE VISIT (OUTPATIENT)
Dept: CARDIOLOGY CLINIC | Age: 73
End: 2023-12-06
Payer: COMMERCIAL

## 2023-12-06 VITALS
HEIGHT: 66 IN | DIASTOLIC BLOOD PRESSURE: 80 MMHG | WEIGHT: 179 LBS | SYSTOLIC BLOOD PRESSURE: 132 MMHG | BODY MASS INDEX: 28.77 KG/M2 | OXYGEN SATURATION: 98 % | HEART RATE: 47 BPM

## 2023-12-06 DIAGNOSIS — I25.119 CORONARY ARTERY DISEASE INVOLVING NATIVE CORONARY ARTERY OF NATIVE HEART WITH ANGINA PECTORIS (HCC): ICD-10-CM

## 2023-12-06 DIAGNOSIS — I49.3 PVC'S (PREMATURE VENTRICULAR CONTRACTIONS): ICD-10-CM

## 2023-12-06 DIAGNOSIS — R00.1 SYMPTOMATIC BRADYCARDIA: Primary | ICD-10-CM

## 2023-12-06 DIAGNOSIS — I49.9 IRREGULAR HEART RATE: ICD-10-CM

## 2023-12-06 DIAGNOSIS — I10 ESSENTIAL HYPERTENSION: ICD-10-CM

## 2023-12-06 DIAGNOSIS — R00.1 BRADYCARDIA: ICD-10-CM

## 2023-12-06 PROCEDURE — 3075F SYST BP GE 130 - 139MM HG: CPT | Performed by: INTERNAL MEDICINE

## 2023-12-06 PROCEDURE — 93798 PHYS/QHP OP CAR RHAB W/ECG: CPT

## 2023-12-06 PROCEDURE — 93000 ELECTROCARDIOGRAM COMPLETE: CPT | Performed by: INTERNAL MEDICINE

## 2023-12-06 PROCEDURE — 3079F DIAST BP 80-89 MM HG: CPT | Performed by: INTERNAL MEDICINE

## 2023-12-06 PROCEDURE — 99214 OFFICE O/P EST MOD 30 MIN: CPT | Performed by: INTERNAL MEDICINE

## 2023-12-06 RX ORDER — NITROGLYCERIN 0.4 MG/1
0.4 TABLET SUBLINGUAL EVERY 5 MIN PRN
Qty: 25 TABLET | Refills: 3 | Status: CANCELLED | OUTPATIENT
Start: 2023-12-06

## 2023-12-06 ASSESSMENT — ENCOUNTER SYMPTOMS
RIGHT EYE: 0
HEMATEMESIS: 0
LEFT EYE: 0
STRIDOR: 0
SHORTNESS OF BREATH: 0
WHEEZING: 0
HEMATOCHEZIA: 0

## 2023-12-06 NOTE — TELEPHONE ENCOUNTER
I spoke to the patient. Can we get him setup for a 30 day event monitor. He can see us in January as planned.

## 2023-12-06 NOTE — TELEPHONE ENCOUNTER
Monitor placed by Alyssa Farley MA/ registered by Luz Lance VC  Length of monitor 2 weeks  Monitor ordered by [facility]  Serial number Su Minda ID [id number]  Activation successful prior to pt leaving office?  Yes

## 2023-12-06 NOTE — PATIENT INSTRUCTIONS
Plan:     2 week cardiac event monitor to further assess bradycardia. Discussed the risks and benefits of a pacemaker (literature provided). Follow up with me in 4 - 6 weeks.

## 2023-12-06 NOTE — TELEPHONE ENCOUNTER
Called and spoke with patient. Lab Appointment made. Patient VU to keep [facility] appt.

## 2023-12-08 ENCOUNTER — HOSPITAL ENCOUNTER (OUTPATIENT)
Dept: CARDIAC REHAB | Age: 73
Setting detail: THERAPIES SERIES
Discharge: HOME | End: 2023-12-08
Payer: COMMERCIAL

## 2023-12-08 PROCEDURE — 93798 PHYS/QHP OP CAR RHAB W/ECG: CPT

## 2023-12-11 ENCOUNTER — APPOINTMENT (OUTPATIENT)
Dept: CARDIAC REHAB | Age: 73
End: 2023-12-11
Payer: COMMERCIAL

## 2023-12-11 ENCOUNTER — TELEPHONE (OUTPATIENT)
Dept: PHARMACY | Age: 73
End: 2023-12-11

## 2023-12-11 NOTE — TELEPHONE ENCOUNTER
Specialty Medication Service    Date: 12/11/2023  Patient's Name: Trey Frias YOB: 1950            _____________________________________________________________________________________________    Left message and Sent text message to schedule PharmD initial appointment for Specialty Medication Services. Please call: [phone] option 4. Will continue to outreach as appropriate.      Anish Kelly CPhT  Pharmacy   Specialty Medication Services   Phone: [phone] option 4

## 2023-12-13 ENCOUNTER — APPOINTMENT (OUTPATIENT)
Dept: CARDIAC REHAB | Age: 73
End: 2023-12-13
Payer: COMMERCIAL

## 2023-12-13 NOTE — TELEPHONE ENCOUNTER
Specialty Medication Service    Date: 12/13/2023  Patient's Name: Deya Salazar YOB: 1950            _____________________________________________________________________________________________    Left message to schedule PharmD initial appointment for Specialty Medication Services. Please call: [phone] option 4. Will continue to outreach as appropriate.      Zack Osman CPhT  Pharmacy   Specialty Medication Services   Phone: [phone] option 4

## 2023-12-15 ENCOUNTER — APPOINTMENT (OUTPATIENT)
Dept: CARDIAC REHAB | Age: 73
End: 2023-12-15
Payer: COMMERCIAL

## 2023-12-15 NOTE — TELEPHONE ENCOUNTER
Specialty Medication Service    Date: 12/15/2023  Patient's Name: Ronald Puente YOB: 1950            _____________________________________________________________________________________________    Tom Ruddle with patient to schedule PharmD initial appointment for Specialty Medication Services.  Patient scheduled 12/21/23 at 9 am.      Leslie Kehr, CPhT  Pharmacy   Specialty Medication Services   Phone: [phone] option [address]    Program: [facility]  CPA in place:  No  Recommendation Provided To: Patient/Caregiver: 1 via Telephone  Intervention Detail: Scheduled Appointment  Intervention Accepted By: Patient/Caregiver: 1  Gap Closed?:    Time Spent (min): 15'

## 2023-12-19 ENCOUNTER — TELEPHONE (OUTPATIENT)
Dept: CARDIOLOGY CLINIC | Age: 73
End: 2023-12-19

## 2023-12-19 NOTE — TELEPHONE ENCOUNTER
[facility] pharmacy stated that they have made several attempts to reach pt regarding a copay card for Repatha. They would like our office to reach out to pt. If pt wants to proceed with [facility] [employer] copay card they can be reached at [phone] option 2 for specialty.

## 2023-12-19 NOTE — TELEPHONE ENCOUNTER
#[phone]  pt states his labs looked pretty good but he wanted to discuss vitamin B12 500 mg with the nurse. He states his glucose is up a bit, but he is working on that. He ask that you call about the vitamin. Thanks. Spoke with Arcenio relayed message as requested. He will contact that number.

## 2023-12-25 ENCOUNTER — APPOINTMENT (OUTPATIENT)
Dept: CARDIAC REHAB | Age: 73
End: 2023-12-25
Payer: COMMERCIAL

## 2023-12-27 ENCOUNTER — HOSPITAL ENCOUNTER (OUTPATIENT)
Dept: CARDIAC REHAB | Age: 73
Setting detail: THERAPIES SERIES
Discharge: HOME | End: 2023-12-27
Payer: COMMERCIAL

## 2023-12-27 PROCEDURE — 93798 PHYS/QHP OP CAR RHAB W/ECG: CPT

## 2023-12-29 ENCOUNTER — HOSPITAL ENCOUNTER (OUTPATIENT)
Dept: CARDIAC REHAB | Age: 73
Setting detail: THERAPIES SERIES
Discharge: HOME | End: 2023-12-29
Payer: COMMERCIAL

## 2023-12-29 PROCEDURE — 93798 PHYS/QHP OP CAR RHAB W/ECG: CPT

## 2024-01-01 ENCOUNTER — APPOINTMENT (OUTPATIENT)
Dept: CARDIAC REHAB | Age: 74
End: 2024-01-01
Payer: COMMERCIAL

## 2024-01-03 ENCOUNTER — HOSPITAL ENCOUNTER (OUTPATIENT)
Dept: CARDIAC REHAB | Age: 74
Setting detail: THERAPIES SERIES
Discharge: HOME | End: 2024-01-03

## 2024-01-03 PROCEDURE — 93798 PHYS/QHP OP CAR RHAB W/ECG: CPT

## 2024-01-05 ENCOUNTER — HOSPITAL ENCOUNTER (OUTPATIENT)
Dept: CARDIAC REHAB | Age: 74
Setting detail: THERAPIES SERIES
Discharge: HOME | End: 2024-01-05

## 2024-01-05 PROCEDURE — 93798 PHYS/QHP OP CAR RHAB W/ECG: CPT

## 2024-01-08 ENCOUNTER — HOSPITAL ENCOUNTER (OUTPATIENT)
Dept: CARDIAC REHAB | Age: 74
Setting detail: THERAPIES SERIES
Discharge: HOME | End: 2024-01-08
Payer: COMMERCIAL

## 2024-01-08 ENCOUNTER — PHARMACY VISIT (OUTPATIENT)
Dept: PHARMACY | Age: 74
End: 2024-01-08

## 2024-01-08 DIAGNOSIS — E78.2 MIXED HYPERLIPIDEMIA: ICD-10-CM

## 2024-01-08 DIAGNOSIS — Z95.1 S/P CABG X 5: ICD-10-CM

## 2024-01-08 DIAGNOSIS — I25.10 CORONARY ARTERY DISEASE INVOLVING NATIVE CORONARY ARTERY OF NATIVE HEART WITHOUT ANGINA PECTORIS: ICD-10-CM

## 2024-01-08 PROCEDURE — 93798 PHYS/QHP OP CAR RHAB W/ECG: CPT

## 2024-01-08 NOTE — PROGRESS NOTES
I called the patient to inform him that I was the medical director for the [facility] subspecialty pharmacy program.    The patient has a history of HLD. The patient is under the care of cardiology.    The patient says the medication is working well. The patient has no major side effects from the medication. The patient takes Repatha.  The patient is getting lab work regularly. I have reviewed pharmacy notes in detail. I told the patient I work in close collaboration with pharmacist    The patient has had no issues getting the medication from the pharmacy.    I told the patient that primary management will be by the patient's specialist. The patient verbalized understanding.    GARETT CHILDRESS MD 1/8/2024

## 2024-01-08 NOTE — CARE COORDINATION
[address] Transitions Interview     2019    Patient: Jojo Monday Patient : 1950   MRN: [id number]  Reason for Admission: There are no discharge diagnoses documented for the most recent discharge. RARS: Readmission Risk Score: 10       Spoke with: Bert Lopez      Readmission Risk  Patient Active Problem List   Diagnosis    Chest pain on exertion    Exertional chest pain    ACS (acute coronary syndrome) (HCC)    Abnormal stress test    Coronary artery disease involving native coronary artery of native heart without angina pectoris    S/P CABG x 5    Elevated PSA    Mixed hyperlipidemia    Prostate cancer [facility])       Inpatient Assessment  Care Transitions Summary    Care Transitions Inpatient Review  Medication Review  Are you able to afford your medications?:  Yes  How often do you have difficulty taking your medications?:  I always take them as prescribed. Housing Review  Who do you live with?:  Partner/Spouse/SO  Are you an active caregiver in your home?:  No  Social Support  Do you have a ?:  No  Do you have a [address] [place] [address]?:  Yes  [address] Name:  Claudia Edgar to seek help/take action for Emergent/Urgent situations i.e. fire, crime, inclement weather or health crisis. :  Independent  Ability handle personal hygiene needs (bathing/dressing/grooming): Independent  Ability to manage medications: Independent  Ability to prepare food:  Independent  Ability to maintain home (clean home, laundry): Independent  Ability to drive and/or has transportation:  Independent  Ability to do shopping:  Independent  Ability to manage finances: Independent  Is patient able to live independently?:  Yes  Hearing and Vision  Care Transitions Interventions         Follow Up: Met with patient to discuss care transition. Role of CTC and care transition program explained to patient.  Patient lives in one-level home with wife, works full-time, and is independent with all ADL's. Wife does not anticipate any discharge needs at this time. CTC provided contact information and will continue to monitor for discharge. If meets criteria, patient agreeable to participate in care transition program post discharge. Encouraged patient to ask to speak with DCP on the unit should any needs arise. Future Appointments  Date Time Provider Carol Rincon   11/18/2019 9:30 AM Miladis Collado MD [facility] [place]  There are no preventive care reminders to display for this patient.     Carolina Zelaya RN 0 (no pain/absence of nonverbal indicators of pain)

## 2024-01-09 RX ORDER — EVOLOCUMAB 140 MG/ML
140 INJECTION, SOLUTION SUBCUTANEOUS
Qty: 2 ADJUSTABLE DOSE PRE-FILLED PEN SYRINGE | Refills: 10 | Status: SHIPPED | OUTPATIENT
Start: 2024-01-09

## 2024-01-10 ENCOUNTER — HOSPITAL ENCOUNTER (OUTPATIENT)
Dept: CARDIAC REHAB | Age: 74
Setting detail: THERAPIES SERIES
Discharge: HOME | End: 2024-01-10
Payer: COMMERCIAL

## 2024-01-10 PROCEDURE — 93798 PHYS/QHP OP CAR RHAB W/ECG: CPT

## 2024-01-12 ENCOUNTER — HOSPITAL ENCOUNTER (OUTPATIENT)
Dept: CARDIAC REHAB | Age: 74
Setting detail: THERAPIES SERIES
Discharge: HOME | End: 2024-01-12
Payer: COMMERCIAL

## 2024-01-12 PROCEDURE — 93798 PHYS/QHP OP CAR RHAB W/ECG: CPT

## 2024-01-15 ENCOUNTER — HOSPITAL ENCOUNTER (OUTPATIENT)
Dept: CARDIAC REHAB | Age: 74
Setting detail: THERAPIES SERIES
Discharge: HOME | End: 2024-01-15
Payer: COMMERCIAL

## 2024-01-15 PROCEDURE — 93798 PHYS/QHP OP CAR RHAB W/ECG: CPT

## 2024-01-15 NOTE — PLAN OF CARE
Cardiopulmonary Rehab Treatment Plan   Name: Arcenio Encinas Assessment Date: 1/15/2024   : 1950 Primary Diagnosis: Treatment Diagnosis 1: PCI      Age: 73 y.o. Referring Physician:  Cyrus Mason   MRN: [id number] Primary Care Physician: Tyree Negrete DO   Treatment Diagnosis  Treatment Diagnosis 1: PCI  PCI Date: 10/21/23  Referral Date: 10/27/23  Significant Cardiovascular History: Previous CABG  Co-morbidities: Metastatic cancer (prostate)    Individual Treatment Plan  ITP Visit Type: Re-assessment  1st Date of Exercise : 10/31/23  ITP Next Review Date: 24  Visit #/Total Visits:   EF%: 53 %  ITP: Exercise; Psychosocial; Nutrition; Education    Exercise   Stages of Change: Marley Barrera Total Score: 0  Test: Six minute walk test    Exercise Prescription  Mode: Track; Treadmill; Bike; Stepper; Ergometer; Elliptical; Rower  Frequency per week: 2-3 supervised sessions weekly  Duration Per Session: 30-45+ minutes of steady aerobic exercise  Intensity METS      : 3-6 (Increase workloads 0.5-1.0 METS/weekly)  RPE: 12-14  Progression: Start w/1-2 sets of 8-12 repetitions for ea. muscle group. Use1-5# initially (very light resistance).  Resistance Training: Yes    Exercise Activity at Home  Type: Patient still exercising at home with weights and bands.  Frequency: twice a week  Duration: 20-30 min  Resistance Training: Yes    Exercise Education  Education: Signs/symptoms to report; RPE scale; Physically active; Warm up/cool down; Equipment orientation; Self pulse    Psychosocial  Stages of Change: Action  [place] Total Score: 17  PHQ-9 Total Score: 0    Psychosocial Intervention  Interventions: No intervention indicated  Currently Taking Psychotropic Meds: No  Medication Changes: No    Psychosocial Education  Education: Cardiac meds; Impact self care behaviors on health; Sexual activity; Relaxation techniques; Tobacco dangers    Psychosocial Target Goals  Target Goal(s): Engages in

## 2024-01-16 ASSESSMENT — ENCOUNTER SYMPTOMS
HEMATEMESIS: 0
LEFT EYE: 0
WHEEZING: 0
SHORTNESS OF BREATH: 0
RIGHT EYE: 0
HEMATOCHEZIA: 0
STRIDOR: 0

## 2024-01-16 NOTE — PROGRESS NOTES
Assessment:     1.  Bradycardia and near syncope: Patient with frequent episodes of bradycardia. Seems he is symptomatic at times. His exertional symptoms, of late, are likely due (at least in part) due to chronotropic incompetence. Multiple ECG's have shown HR in 40's. His cardiac rehab strips showed resting bradycardia and exertional heart rates only reaching 90's. He also reports inability to reach target HR during stress treadmill test.    Additionally, based on his CAD and low normal LV systolic function, he should be on beta-blocker therapy to allow for proper treatment of that disease. Unable to take such medication due to limitations presented by bradycardia.    We did an event monitor on patient which showed average HR in 50's and no long pauses. No significant AV block. Evidence again suggestive of sinus node dysfunction/chronotropic incompetence but no marked abnormalities.     Again discussed the option of pacemaker implantation to allow for the proper treatment of his heart disease and to protect him from effects of chronotropic incompetence. He wishes to consider this option towards the end of the year because of personal reasons (he is an open cockpit ). For now, avoid any negative chronotropic agents.    2. Dyspnea on exertion/CAD: continue current medications. Management as per primary cardiologist.    3. Good overall blood pressure control    Plan:     Discussed the risks and benefits of a pacemaker (literature provided).   Avoid negative chronotropic agents.   Follow up with me in 8 months.    Subjective:     Patient ID: Arcenio Encinas is a 73 y.o. male.    Chief Complaint:  Chief Complaint   Patient presents with    Follow-up     4-6 weeks follow up     Other     PVCs    Bradycardia     HPI    Patient is a pleasant 73 y.o. male who presents for evaluation of PVC's. The patient has a past medical history of coronary artery disease, hypertension, hyperlipidemia, prostate cancer, and PVC's. He

## 2024-01-17 ENCOUNTER — OFFICE VISIT (OUTPATIENT)
Dept: CARDIOLOGY CLINIC | Age: 74
End: 2024-01-17
Payer: COMMERCIAL

## 2024-01-17 ENCOUNTER — HOSPITAL ENCOUNTER (OUTPATIENT)
Dept: CARDIAC REHAB | Age: 74
Setting detail: THERAPIES SERIES
Discharge: HOME | End: 2024-01-17
Payer: COMMERCIAL

## 2024-01-17 VITALS
SYSTOLIC BLOOD PRESSURE: 112 MMHG | BODY MASS INDEX: 28.61 KG/M2 | OXYGEN SATURATION: 100 % | HEART RATE: 50 BPM | WEIGHT: 178 LBS | DIASTOLIC BLOOD PRESSURE: 70 MMHG | HEIGHT: 66 IN

## 2024-01-17 DIAGNOSIS — R00.1 BRADYCARDIA: Primary | ICD-10-CM

## 2024-01-17 DIAGNOSIS — I49.3 PVC'S (PREMATURE VENTRICULAR CONTRACTIONS): ICD-10-CM

## 2024-01-17 DIAGNOSIS — I25.10 CORONARY ARTERY DISEASE INVOLVING NATIVE CORONARY ARTERY OF NATIVE HEART WITHOUT ANGINA PECTORIS: ICD-10-CM

## 2024-01-17 DIAGNOSIS — I45.89 CHRONOTROPIC INCOMPETENCE: ICD-10-CM

## 2024-01-17 DIAGNOSIS — R06.09 DOE (DYSPNEA ON EXERTION): ICD-10-CM

## 2024-01-17 PROCEDURE — 3078F DIAST BP <80 MM HG: CPT | Performed by: INTERNAL MEDICINE

## 2024-01-17 PROCEDURE — 99214 OFFICE O/P EST MOD 30 MIN: CPT | Performed by: INTERNAL MEDICINE

## 2024-01-17 PROCEDURE — 3074F SYST BP LT 130 MM HG: CPT | Performed by: INTERNAL MEDICINE

## 2024-01-17 PROCEDURE — 1123F ACP DISCUSS/DSCN MKR DOCD: CPT | Performed by: INTERNAL MEDICINE

## 2024-01-17 PROCEDURE — 93000 ELECTROCARDIOGRAM COMPLETE: CPT | Performed by: INTERNAL MEDICINE

## 2024-01-17 PROCEDURE — 93798 PHYS/QHP OP CAR RHAB W/ECG: CPT

## 2024-01-17 NOTE — PATIENT INSTRUCTIONS
Plan:     Discussed the risks and benefits of a pacemaker (literature provided).   Avoid negative chronotropic agents.   Follow up with me in 8 months.

## 2024-01-19 ENCOUNTER — APPOINTMENT (OUTPATIENT)
Dept: CARDIAC REHAB | Age: 74
End: 2024-01-19
Payer: COMMERCIAL

## 2024-01-22 ENCOUNTER — HOSPITAL ENCOUNTER (OUTPATIENT)
Dept: CARDIAC REHAB | Age: 74
Setting detail: THERAPIES SERIES
Discharge: HOME | End: 2024-01-22
Payer: COMMERCIAL

## 2024-01-22 PROCEDURE — 93798 PHYS/QHP OP CAR RHAB W/ECG: CPT

## 2024-01-24 ENCOUNTER — HOSPITAL ENCOUNTER (OUTPATIENT)
Dept: CARDIAC REHAB | Age: 74
Setting detail: THERAPIES SERIES
Discharge: HOME | End: 2024-01-24
Payer: COMMERCIAL

## 2024-01-24 PROCEDURE — 93798 PHYS/QHP OP CAR RHAB W/ECG: CPT

## 2024-01-26 ENCOUNTER — HOSPITAL ENCOUNTER (OUTPATIENT)
Dept: CARDIAC REHAB | Age: 74
Setting detail: THERAPIES SERIES
Discharge: HOME | End: 2024-01-26
Payer: COMMERCIAL

## 2024-01-26 ENCOUNTER — TELEPHONE (OUTPATIENT)
Dept: CARDIOLOGY CLINIC | Age: 74
End: 2024-01-26

## 2024-01-26 PROCEDURE — 93798 PHYS/QHP OP CAR RHAB W/ECG: CPT

## 2024-01-26 NOTE — TELEPHONE ENCOUNTER
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 Pt will pick these documents up:    - Hospital admission history  -Hospital   -Hospital discharge summary  - All diagnostics testings  - The make and seral number of stents  -Cardiologist report   -family history   - ov notes   - lab work   -Medication list   - cardiac test   - cardiac workup results  - Clearance letter from [facility]     If need more or have questions a good callback is [phone]

## 2024-01-29 ENCOUNTER — HOSPITAL ENCOUNTER (OUTPATIENT)
Dept: CARDIAC REHAB | Age: 74
Setting detail: THERAPIES SERIES
Discharge: HOME | End: 2024-01-29
Payer: COMMERCIAL

## 2024-01-29 PROCEDURE — 93798 PHYS/QHP OP CAR RHAB W/ECG: CPT

## 2024-01-29 NOTE — TELEPHONE ENCOUNTER
1/29/2024    RE: Arcenio Encinas    To whom it may concern,    I am writing this letter on behalf of my patient, Arcenio Encinas.  He is in good health and after thorough assessment, it is my professional opinion that he is suitable from an interventional cardiology health standpoint to pursue and maintain a 's license    His medical history and recent interventional cardiac evaluations indicate no significant concerns which would impede his ability to pursue approval for his 's license.    Please feel free to contact me if any further information is required.    Cyrus Mason  Interventional cardiology  [facility]  [address]  [place]  [facility], [address]

## 2024-01-29 NOTE — PROGRESS NOTES
[facility]   Cardiovascular Evaluation    PATIENT: Arcenio Encinas  DATE: 2024  MRN: [id number]  CSN: [id number]  : 1950    Primary Care Doctor: Tyree Negrete DO    Reason for evaluation:   Follow-up, Coronary Artery Disease, Hypertension, Hyperlipidemia, and Shortness of Breath (Has to deep breath at times. )      History of present illness on initial date of evaluation:   Arcenio Encinas is a 73 y.o. patient who presents who presents for follow up. He has a past medical history including CAD, s/p CABG x 5, and HLD. He had a left heart catheterization 2015 which showed multivessel CAD, not favorable of PCI. He therefore underwent underwent CABG x 5 with  on 2015. He had a GXT plain stress test 10/25/2016 which was normal. His echocardiogram 2016 EF 55%, mild MR, and mild TR. He has been following with his PCP Tyree Negrete DO.    He had an Lexiscan stress Myoview 10/09/23 inferolateral hypokinesis/scar with ignacio infarct ischemia. Therefore he proceeded with elective LHC and RHC on 10/20/23 that resulted in PCI of CIRC w/ MOMO x 3. Noted he was bradycardic, no beta blockers. Echo 10/27/23 EF 50-55%, Grade I DD, mild MR, and mild left atrial enlargement.    Since last office visit he was seen by  for PVC's and bradycardia. Patient wore a cardiac event monitor from 2023 to 2023 which demonstrated predominately SB with an average HR of 56. Discussed pacemaker insertion and routine follow up with Electrophysiology.    Today he is doing well. Patient currently denies any weight gain, edema, palpitations, chest pain, shortness of breath, dizziness, and syncope. He is taking medications as prescribed, tolerating well. He plans on having  licensure renewed.        Patient Active Problem List   Diagnosis    Chest pain on exertion    Chest pain    ACS (acute coronary syndrome) (HCC)    Abnormal stress test    Coronary artery

## 2024-01-29 NOTE — TELEPHONE ENCOUNTER
Letter printed as well as other documentation, called pt to relay. Pt v/u. Relayed ppw is in Lj office, pt v/u

## 2024-01-29 NOTE — TELEPHONE ENCOUNTER
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

## 2024-01-30 ENCOUNTER — OFFICE VISIT (OUTPATIENT)
Dept: CARDIOLOGY CLINIC | Age: 74
End: 2024-01-30
Payer: COMMERCIAL

## 2024-01-30 VITALS
SYSTOLIC BLOOD PRESSURE: 134 MMHG | WEIGHT: 178 LBS | DIASTOLIC BLOOD PRESSURE: 72 MMHG | BODY MASS INDEX: 28.61 KG/M2 | HEART RATE: 57 BPM | OXYGEN SATURATION: 100 % | HEIGHT: 66 IN

## 2024-01-30 DIAGNOSIS — E78.2 MIXED HYPERLIPIDEMIA: ICD-10-CM

## 2024-01-30 DIAGNOSIS — I25.10 CORONARY ARTERY DISEASE INVOLVING NATIVE CORONARY ARTERY OF NATIVE HEART WITHOUT ANGINA PECTORIS: ICD-10-CM

## 2024-01-30 DIAGNOSIS — I10 HYPERTENSION, UNSPECIFIED TYPE: ICD-10-CM

## 2024-01-30 DIAGNOSIS — Z95.1 S/P CABG X 5: ICD-10-CM

## 2024-01-30 DIAGNOSIS — Z79.899 MEDICATION MANAGEMENT: Primary | ICD-10-CM

## 2024-01-30 PROCEDURE — 3075F SYST BP GE 130 - 139MM HG: CPT | Performed by: INTERNAL MEDICINE

## 2024-01-30 PROCEDURE — 1123F ACP DISCUSS/DSCN MKR DOCD: CPT | Performed by: INTERNAL MEDICINE

## 2024-01-30 PROCEDURE — 3078F DIAST BP <80 MM HG: CPT | Performed by: INTERNAL MEDICINE

## 2024-01-30 PROCEDURE — 99214 OFFICE O/P EST MOD 30 MIN: CPT | Performed by: INTERNAL MEDICINE

## 2024-01-30 RX ORDER — LOSARTAN POTASSIUM 25 MG/1
25 TABLET ORAL DAILY
Qty: 90 TABLET | Refills: 3 | Status: SHIPPED | OUTPATIENT
Start: 2024-01-30

## 2024-01-30 RX ORDER — ROSUVASTATIN CALCIUM 5 MG/1
5 TABLET, COATED ORAL DAILY
Qty: 90 TABLET | Refills: 3 | Status: SHIPPED | OUTPATIENT
Start: 2024-01-30

## 2024-01-30 NOTE — PATIENT INSTRUCTIONS
Plan:  1. Order fasting lipid and liver function testing  2. I recommend that the patient continue their currently prescribed medications. Their drug modifiable risk factors appear to be well controlled. I will continue to address the need/dosing of medications in future visits.   3. Letter for  renewal   4. Recommend continue to follow with Dr.Almuti freitas  5. Follow up with me in 6 months

## 2024-01-31 ENCOUNTER — HOSPITAL ENCOUNTER (OUTPATIENT)
Dept: CARDIAC REHAB | Age: 74
Setting detail: THERAPIES SERIES
Discharge: HOME | End: 2024-01-31
Payer: COMMERCIAL

## 2024-01-31 PROCEDURE — 93798 PHYS/QHP OP CAR RHAB W/ECG: CPT

## 2024-02-02 ENCOUNTER — HOSPITAL ENCOUNTER (OUTPATIENT)
Dept: CARDIAC REHAB | Age: 74
Setting detail: THERAPIES SERIES
Discharge: HOME | End: 2024-02-02
Payer: COMMERCIAL

## 2024-02-02 DIAGNOSIS — R00.1 BRADYCARDIA: ICD-10-CM

## 2024-02-02 PROCEDURE — 93798 PHYS/QHP OP CAR RHAB W/ECG: CPT

## 2024-02-05 ENCOUNTER — HOSPITAL ENCOUNTER (OUTPATIENT)
Dept: CARDIAC REHAB | Age: 74
Setting detail: THERAPIES SERIES
Discharge: HOME | End: 2024-02-05
Payer: COMMERCIAL

## 2024-02-05 PROCEDURE — 93798 PHYS/QHP OP CAR RHAB W/ECG: CPT

## 2024-02-05 ASSESSMENT — PATIENT HEALTH QUESTIONNAIRE - PHQ9
10. IF YOU CHECKED OFF ANY PROBLEMS, HOW DIFFICULT HAVE THESE PROBLEMS MADE IT FOR YOU TO DO YOUR WORK, TAKE CARE OF THINGS AT HOME, OR GET ALONG WITH OTHER PEOPLE: 0
4. FEELING TIRED OR HAVING LITTLE ENERGY: 0
SUM OF ALL RESPONSES TO PHQ QUESTIONS 1-9: 0
7. TROUBLE CONCENTRATING ON THINGS, SUCH AS READING THE NEWSPAPER OR WATCHING TELEVISION: 0
SUM OF ALL RESPONSES TO PHQ QUESTIONS 1-9: 0
SUM OF ALL RESPONSES TO PHQ9 QUESTIONS 1 & 2: 0
2. FEELING DOWN, DEPRESSED OR HOPELESS: 0
5. POOR APPETITE OR OVEREATING: 0
3. TROUBLE FALLING OR STAYING ASLEEP: 0
SUM OF ALL RESPONSES TO PHQ QUESTIONS 1-9: 0
SUM OF ALL RESPONSES TO PHQ QUESTIONS 1-9: 0
1. LITTLE INTEREST OR PLEASURE IN DOING THINGS: 0
6. FEELING BAD ABOUT YOURSELF - OR THAT YOU ARE A FAILURE OR HAVE LET YOURSELF OR YOUR FAMILY DOWN: 0
8. MOVING OR SPEAKING SO SLOWLY THAT OTHER PEOPLE COULD HAVE NOTICED. OR THE OPPOSITE, BEING SO FIGETY OR RESTLESS THAT YOU HAVE BEEN MOVING AROUND A LOT MORE THAN USUAL: 0
9. THOUGHTS THAT YOU WOULD BE BETTER OFF DEAD, OR OF HURTING YOURSELF: 0

## 2024-02-07 ENCOUNTER — HOSPITAL ENCOUNTER (OUTPATIENT)
Dept: CARDIAC REHAB | Age: 74
Setting detail: THERAPIES SERIES
Discharge: HOME | End: 2024-02-07
Payer: COMMERCIAL

## 2024-02-07 PROCEDURE — 93798 PHYS/QHP OP CAR RHAB W/ECG: CPT

## 2024-02-07 ASSESSMENT — EXERCISE STRESS TEST
PEAK_BP: 148/70
PEAK_HR: 86
PEAK_RPE: 9
PEAK_BP: 148/70
PEAK_RPD: 1

## 2024-02-07 ASSESSMENT — PATIENT HEALTH QUESTIONNAIRE - PHQ9
SUM OF ALL RESPONSES TO PHQ QUESTIONS 1-9: 0
7. TROUBLE CONCENTRATING ON THINGS, SUCH AS READING THE NEWSPAPER OR WATCHING TELEVISION: 0
SUM OF ALL RESPONSES TO PHQ QUESTIONS 1-9: 0
SUM OF ALL RESPONSES TO PHQ QUESTIONS 1-9: 0
SUM OF ALL RESPONSES TO PHQ9 QUESTIONS 1 & 2: 0
1. LITTLE INTEREST OR PLEASURE IN DOING THINGS: 0
4. FEELING TIRED OR HAVING LITTLE ENERGY: 0
9. THOUGHTS THAT YOU WOULD BE BETTER OFF DEAD, OR OF HURTING YOURSELF: 0
6. FEELING BAD ABOUT YOURSELF - OR THAT YOU ARE A FAILURE OR HAVE LET YOURSELF OR YOUR FAMILY DOWN: 0
5. POOR APPETITE OR OVEREATING: 0
3. TROUBLE FALLING OR STAYING ASLEEP: 0
10. IF YOU CHECKED OFF ANY PROBLEMS, HOW DIFFICULT HAVE THESE PROBLEMS MADE IT FOR YOU TO DO YOUR WORK, TAKE CARE OF THINGS AT HOME, OR GET ALONG WITH OTHER PEOPLE: 0
SUM OF ALL RESPONSES TO PHQ QUESTIONS 1-9: 0
8. MOVING OR SPEAKING SO SLOWLY THAT OTHER PEOPLE COULD HAVE NOTICED. OR THE OPPOSITE, BEING SO FIGETY OR RESTLESS THAT YOU HAVE BEEN MOVING AROUND A LOT MORE THAN USUAL: 0

## 2024-02-07 NOTE — PLAN OF CARE
weight.    Education  Learning Barrier: Ready to learn    Education Target Goals  Target Goals: Risk factors; Understand target guidelines for B/P    Patient Education   Education: CAD; Risk factors; Med compliance; Cardiac A&P; Signs/Symptoms of angina; Sexuality  Hypertension Controlled: Yes  BP Meds: Cozaar     Statins Prescribed: Yes  Statin Intensity: Moderate  Statins Adherent: Yes  Education Target Goals  Target Goals: Risk factors; Understand target guidelines for B/P    Staff Treatment Goals  Exercise Goal: Understand appropriate RPE levels.  Exercise Goal Status: Met  Exercise Goal Comments: pt plans to continue exercise at home daily. discussed getting out of the routine and dealing with set backs.  Exercise Goal Assessment: Problems/barriers  Functional Capacity Goal: discharge walk test feet > than initial  Functional Capacity Goal Status: Met  Functional Capacity Goal Comments: pt imporoved walk test by 115 feet  Blood Pressure Goal: Identify the benefits of exercise in the treatment of hypertension.  Blood Pressure Goal Status: Met  Blood Pressure Goal Comments: pt verbalized connection between blood pressure management and exercise  Psychosocial Goal: Identify what stress/depression is & discussion of stressors (both positive and negative).  Psychsocial Goal Status: Progressing  Psychosocial Goal Assessment: Key functional changes  Nutrition Goal: Discuss label interpretation and begin reading nutrition labels while shopping.  Nutrition Goal Status: Met  Nutrition Goal Comments: Rate your plate score improved. patient making heart healthy choices.  Nutrition Goal Assessment: Recommendations              Provider Review and Approval of this ITP    The above treatment plan and goals have been set for your patient during Cardiac Rehabilitation. Please review and Electronically Cosign        Electronically signed by Estelita Burton RN on 2/7/24 at 9:35 AM EST

## 2024-02-08 ENCOUNTER — OFFICE VISIT (OUTPATIENT)
Dept: DERMATOLOGY | Age: 74
End: 2024-02-08
Payer: COMMERCIAL

## 2024-02-08 DIAGNOSIS — D22.9 MULTIPLE NEVI: ICD-10-CM

## 2024-02-08 DIAGNOSIS — L57.0 AK (ACTINIC KERATOSIS): ICD-10-CM

## 2024-02-08 DIAGNOSIS — L81.4 SOLAR LENTIGINOSIS: Primary | ICD-10-CM

## 2024-02-08 PROCEDURE — 1123F ACP DISCUSS/DSCN MKR DOCD: CPT | Performed by: DERMATOLOGY

## 2024-02-08 PROCEDURE — 99213 OFFICE O/P EST LOW 20 MIN: CPT | Performed by: DERMATOLOGY

## 2024-02-08 NOTE — PROGRESS NOTES
[facility]  Ayaan De Jesus MD  [phone]      Arcenio Encinas  1950    73 y.o. male     Date of Visit: 2/8/2024    Chief Complaint: skin lesions    History of Present Illness:    1.  He has stable freckling on the shoulders, upper chest and upper extremities.    2.  He has multiple moles on the trunk and upper extremities-not aware of any concerning changes.    3.  He reports several persistent scaly lesions of the lateral portions of the cheeks.    Dermatologic history:    He has a longstanding history of lichen planopilaris of the scalp that remains stable, inactive and asymptomatic.     He has a history of a moderately dysplastic nevus on the right upper chest (2/7/2023).    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  Loves to fly.      Review of Systems:  Gen: Feels well, good sense of health.    Past Medical History, Family History, Surgical History, Medications and Allergies reviewed.    Past Medical History:   Diagnosis Date    CAD (coronary artery disease)     HTN (hypertension) 10/3/2023    Mixed hyperlipidemia 11/26/2018    Prostate cancer (HCC) 1/21/2019     Past Surgical History:   Procedure Laterality Date    CARDIAC CATHETERIZATION  12/28/2015    Dr Mason    COLONOSCOPY  1-10-11    COLONOSCOPY N/A 11/29/2021    COLONOSCOPY POLYPECTOMY SNARE/COLD BIOPSY performed by Job Timmons MD at [facility] ENDOSCOPY    CORONARY ARTERY BYPASS GRAFT  12.30.2015    [facility]    DENTAL SURGERY      PROSTATECTOMY N/A 1/28/2019    ROBOTIC LAPAROSCOPIC RADICAL PROSTATECTOMY WITH BILATERAL LYMPH NODE DISSECTION AND RIGHT SIDE NERVE SPARING performed by Wood Brown MD at [facility] OR       No Known Allergies  Outpatient Medications Marked as Taking for the 2/8/24 encounter (Office Visit) with Ayaan De Jesus MD   Medication Sig Dispense Refill    losartan (COZAAR) 25 MG tablet Take 1 tablet by mouth daily 90

## 2024-02-09 ENCOUNTER — APPOINTMENT (OUTPATIENT)
Dept: CARDIAC REHAB | Age: 74
End: 2024-02-09
Payer: COMMERCIAL

## 2024-02-12 ENCOUNTER — APPOINTMENT (OUTPATIENT)
Dept: CARDIAC REHAB | Age: 74
End: 2024-02-12
Payer: COMMERCIAL

## 2024-02-14 ENCOUNTER — APPOINTMENT (OUTPATIENT)
Dept: CARDIAC REHAB | Age: 74
End: 2024-02-14
Payer: COMMERCIAL

## 2024-02-16 ENCOUNTER — APPOINTMENT (OUTPATIENT)
Dept: CARDIAC REHAB | Age: 74
End: 2024-02-16
Payer: COMMERCIAL

## 2024-02-19 ENCOUNTER — APPOINTMENT (OUTPATIENT)
Dept: CARDIAC REHAB | Age: 74
End: 2024-02-19
Payer: COMMERCIAL

## 2024-02-21 ENCOUNTER — APPOINTMENT (OUTPATIENT)
Dept: CARDIAC REHAB | Age: 74
End: 2024-02-21
Payer: COMMERCIAL

## 2024-02-23 ENCOUNTER — APPOINTMENT (OUTPATIENT)
Dept: CARDIAC REHAB | Age: 74
End: 2024-02-23
Payer: COMMERCIAL

## 2024-05-20 NOTE — PROGRESS NOTES
PSA    Mixed hyperlipidemia    Prostate cancer (HCC)    Psoas abscess (HCC)    Leukocytosis    Fever    Sepsis due to methicillin susceptible Staphylococcus aureus (HCC)    History of prostate cancer    Status post prostatectomy    Coronary artery disease due to lipid rich plaque    Overweight    Complicated UTI (urinary tract infection)    Receiving intravenous antibiotic treatment as outpatient    Medication management    HTN (hypertension)    SOB (shortness of breath)    PVC's (premature ventricular contractions)    Bradycardia         Cardiac Testing: I have reviewed the findings below.  EKG:  ECHO:   STRESS TEST:  CATH: 12/28/15  CONCLUSIONS:  Multivessel coronary artery disease, not favorable for  percutaneous revascularization.        I have recommended the patient consider to undergo surgical bypass.  He will  be transferred to the floor and then have our surgical colleague see him for  further timing and recommendation.          BYPASS: 12/30/15  Urgent CABG X 5, with pedicled LIMA to LAD, sequential Greater Saphenous VG to Diag 1 then on to OM 2 then on to OM 3, separate single Greater Saphenous VG to Diag 2, SGC, CPB, EVH Left Greater Saphenous vein, KATHRYN, Epiaortic ultrasound, Doppler verification of grafts, Bilateral 5 level intercostal nerve block(Exparel), Platelet gel application.    VASCULAR:    Past Medical History:   has a past medical history of CAD (coronary artery disease), HTN (hypertension), Mixed hyperlipidemia, and Prostate cancer (HCC).    Surgical History:   has a past surgical history that includes Dental surgery; Colonoscopy (1-10-11); Cardiac catheterization (12/28/2015); Coronary artery bypass graft (12.30.2015); Prostatectomy (N/A, 1/28/2019); and Colonoscopy (N/A, 11/29/2021).     Social History:   reports that he quit smoking about 42 years ago. His smoking use included cigarettes. He started smoking about 51 years ago. He has a 2.5 pack-year smoking history. He has never been

## 2024-05-21 ENCOUNTER — OFFICE VISIT (OUTPATIENT)
Dept: CARDIOLOGY CLINIC | Age: 74
End: 2024-05-21
Payer: COMMERCIAL

## 2024-05-21 VITALS
DIASTOLIC BLOOD PRESSURE: 70 MMHG | OXYGEN SATURATION: 100 % | SYSTOLIC BLOOD PRESSURE: 136 MMHG | HEART RATE: 62 BPM | BODY MASS INDEX: 28 KG/M2 | HEIGHT: 66 IN | WEIGHT: 174.2 LBS

## 2024-05-21 DIAGNOSIS — I25.10 CORONARY ARTERY DISEASE INVOLVING NATIVE CORONARY ARTERY OF NATIVE HEART WITHOUT ANGINA PECTORIS: Primary | ICD-10-CM

## 2024-05-21 DIAGNOSIS — Z95.1 S/P CABG X 5: ICD-10-CM

## 2024-05-21 DIAGNOSIS — E78.2 MIXED HYPERLIPIDEMIA: ICD-10-CM

## 2024-05-21 PROCEDURE — 3075F SYST BP GE 130 - 139MM HG: CPT | Performed by: INTERNAL MEDICINE

## 2024-05-21 PROCEDURE — 99214 OFFICE O/P EST MOD 30 MIN: CPT | Performed by: INTERNAL MEDICINE

## 2024-05-21 PROCEDURE — 1123F ACP DISCUSS/DSCN MKR DOCD: CPT | Performed by: INTERNAL MEDICINE

## 2024-05-21 PROCEDURE — 3078F DIAST BP <80 MM HG: CPT | Performed by: INTERNAL MEDICINE

## 2024-05-21 NOTE — PATIENT INSTRUCTIONS
Your provider has ordered testing for further evaluation.  An order/prescription has been included in your paper work.   To schedule outpatient testing, contact Central Scheduling by calling [address] [place] [address] ([phone]).       Plan:  1.  Order echocardiogram in October 2024 ~ assess ejection fraction  2.  Proceed as planned with fasting lipids and liver function testing ~ Repatha   3.  I recommend that the patient continue their currently prescribed medications. Their drug modifiable risk factors appear to be well controlled. I will continue to address the need/dosing of medications in future visits.   4.  Continue to follow with PCP Tyree Negrete DO as planned for annual follow up  5.  Follow up with me in 1 year

## 2024-05-22 DIAGNOSIS — Z79.899 MEDICATION MANAGEMENT: ICD-10-CM

## 2024-05-22 LAB
ALBUMIN SERPL-MCNC: 4.1 G/DL (ref 3.4–5)
ALP SERPL-CCNC: 63 U/L (ref 40–129)
ALT SERPL-CCNC: 18 U/L (ref 10–40)
AST SERPL-CCNC: 22 U/L (ref 15–37)
BILIRUB DIRECT SERPL-MCNC: <0.2 MG/DL (ref 0–0.3)
BILIRUB INDIRECT SERPL-MCNC: NORMAL MG/DL (ref 0–1)
BILIRUB SERPL-MCNC: 0.7 MG/DL (ref 0–1)
CHOLEST SERPL-MCNC: 99 MG/DL (ref 0–199)
HDLC SERPL-MCNC: 56 MG/DL (ref 40–60)
LDLC SERPL CALC-MCNC: 27 MG/DL
PROT SERPL-MCNC: 6.6 G/DL (ref 6.4–8.2)
TRIGL SERPL-MCNC: 78 MG/DL (ref 0–150)
VLDLC SERPL CALC-MCNC: 16 MG/DL

## 2024-05-23 ENCOUNTER — TELEPHONE (OUTPATIENT)
Dept: PHARMACY | Age: 74
End: 2024-05-23

## 2024-05-23 ENCOUNTER — TELEPHONE (OUTPATIENT)
Dept: CARDIOLOGY CLINIC | Age: 74
End: 2024-05-23

## 2024-05-23 NOTE — TELEPHONE ENCOUNTER
----- Message from Cyrus Mason MD sent at 5/23/2024  8:07 AM EDT -----  Numbers are in perfect ranges.    Continue current medical regimen.    Call patient with results and recommendations.  
Called and spoke with patient. Relayed [facility] results. Patient VU.   
Spine appears normal, range of motion is not limited, no muscle or joint tenderness

## 2024-05-23 NOTE — TELEPHONE ENCOUNTER
Specialty Medication Service    Date: 5/23/2024  Patient's Name: Arcenio Encinas YOB: 1950            _____________________________________________________________________________________________    Spoke with patient to schedule PharmD follow up appointment for Specialty Medication Services. Patient scheduled 06/18/24 at 9 am.      Juani Dumont CPhT  Pharmacy   Specialty Medication Services   Phone: [phone] option 4    For Pharmacy Admin Tracking Only    Program: [facility]  CPA in place:  Yes  Recommendation Provided To: Patient/Caregiver: 1 via Telephone  Intervention Detail: Scheduled Appointment  Intervention Accepted By: Patient/Caregiver: 1  Gap Closed?:    Time Spent (min): 15

## 2024-06-18 ENCOUNTER — PHARMACY VISIT (OUTPATIENT)
Dept: PHARMACY | Age: 74
End: 2024-06-18

## 2024-06-18 DIAGNOSIS — I25.10 CORONARY ARTERY DISEASE INVOLVING NATIVE CORONARY ARTERY OF NATIVE HEART WITHOUT ANGINA PECTORIS: ICD-10-CM

## 2024-06-18 DIAGNOSIS — E78.2 MIXED HYPERLIPIDEMIA: ICD-10-CM

## 2024-06-18 DIAGNOSIS — Z95.1 S/P CABG X 5: Primary | ICD-10-CM

## 2024-06-18 RX ORDER — GLUCOSAMINE/MSM/CHONDROITIN A 500-83-400
50 TABLET ORAL DAILY
COMMUNITY

## 2024-06-18 ASSESSMENT — PATIENT HEALTH QUESTIONNAIRE - PHQ9
2. FEELING DOWN, DEPRESSED OR HOPELESS: NOT AT ALL
SUM OF ALL RESPONSES TO PHQ QUESTIONS 1-9: 0
SUM OF ALL RESPONSES TO PHQ QUESTIONS 1-9: 0
SUM OF ALL RESPONSES TO PHQ9 QUESTIONS 1 & 2: 0
SUM OF ALL RESPONSES TO PHQ QUESTIONS 1-9: 0
SUM OF ALL RESPONSES TO PHQ QUESTIONS 1-9: 0
1. LITTLE INTEREST OR PLEASURE IN DOING THINGS: NOT AT ALL

## 2024-06-18 NOTE — PROGRESS NOTES
Specialty Medication Service    Patient's Name: Arcenio Encinas YOB: 1950      Reason for visit: Arcenio Encinas is a 73 y.o. male presenting today for Specialty Medication Service visit follow up. Patient last seen by [facility] 2023. Patient continues on [facility] formulary medication, Repatha. Pharmacy completed Specialty Medication Service visit for medication monitoring and counseling. Medication list updated.    Specialty Medication: Repatha Sureclick 140mg/mL SOAJ   Frequency: Every 14 days   Indication: hyperlipidemia and CAD/ACS  Initially Diagnosed: , 8 years ago   Additional Therapy:   Rosuvastatin   Previous Therapy:   None     Specialist:   Cyrus Mason MD  [facility]  [address] [place] [address]  [phone]  Specialist Progress Note Available: Yes  Last Specialist Visit:   2024 - Proceed as planned with fasting lipids and liver function testing ~ Repatha. continue currently prescribed medications. Their drug modifiable risk factors appear to be well controlled. Follow-up in 12 months.   2024 - continue currently prescribed medications. Their drug modifiable risk factors appear to be well controlled. Follow-up in 6 months.   2024 - seen for PVCs and bradycardia. No change to hyperlipidemia medications.      No Known Allergies    Past Medical History:   Diagnosis Date    CAD (coronary artery disease)     HTN (hypertension) 10/3/2023    Mixed hyperlipidemia 2018    Prostate cancer (HCC) 2019      Social History     Tobacco Use    Smoking status: Former     Current packs/day: 0.00     Average packs/day: 0.3 packs/day for 10.0 years (2.5 ttl pk-yrs)     Types: Cigarettes     Start date: 1973     Quit date: 1982     Years since quittin.4     Passive exposure: Never    Smokeless tobacco: Never   Substance Use Topics    Alcohol use: Yes     Alcohol/week: 5.0 standard drinks of alcohol     Types: 5 Glasses of wine per week     Family

## 2024-07-16 ENCOUNTER — OFFICE VISIT (OUTPATIENT)
Dept: FAMILY MEDICINE CLINIC | Age: 74
End: 2024-07-16
Payer: COMMERCIAL

## 2024-07-16 VITALS
RESPIRATION RATE: 16 BRPM | WEIGHT: 175.6 LBS | BODY MASS INDEX: 27.56 KG/M2 | SYSTOLIC BLOOD PRESSURE: 150 MMHG | OXYGEN SATURATION: 99 % | TEMPERATURE: 97.1 F | HEART RATE: 47 BPM | HEIGHT: 67 IN | DIASTOLIC BLOOD PRESSURE: 80 MMHG

## 2024-07-16 DIAGNOSIS — I25.10 CORONARY ARTERY DISEASE INVOLVING NATIVE CORONARY ARTERY OF NATIVE HEART WITHOUT ANGINA PECTORIS: ICD-10-CM

## 2024-07-16 DIAGNOSIS — Z00.00 WELLNESS EXAMINATION: Primary | ICD-10-CM

## 2024-07-16 DIAGNOSIS — I25.119 CORONARY ARTERY DISEASE INVOLVING NATIVE CORONARY ARTERY OF NATIVE HEART WITH ANGINA PECTORIS (HCC): ICD-10-CM

## 2024-07-16 DIAGNOSIS — C61 PROSTATE CANCER (HCC): ICD-10-CM

## 2024-07-16 PROCEDURE — 99397 PER PM REEVAL EST PAT 65+ YR: CPT | Performed by: FAMILY MEDICINE

## 2024-07-16 PROCEDURE — 3078F DIAST BP <80 MM HG: CPT | Performed by: FAMILY MEDICINE

## 2024-07-16 PROCEDURE — 3077F SYST BP >= 140 MM HG: CPT | Performed by: FAMILY MEDICINE

## 2024-07-16 ASSESSMENT — PATIENT HEALTH QUESTIONNAIRE - PHQ9
SUM OF ALL RESPONSES TO PHQ9 QUESTIONS 1 & 2: 0
1. LITTLE INTEREST OR PLEASURE IN DOING THINGS: NOT AT ALL
SUM OF ALL RESPONSES TO PHQ QUESTIONS 1-9: 0
SUM OF ALL RESPONSES TO PHQ QUESTIONS 1-9: 0
2. FEELING DOWN, DEPRESSED OR HOPELESS: NOT AT ALL
SUM OF ALL RESPONSES TO PHQ QUESTIONS 1-9: 0
SUM OF ALL RESPONSES TO PHQ QUESTIONS 1-9: 0

## 2024-07-16 ASSESSMENT — ENCOUNTER SYMPTOMS
ABDOMINAL PAIN: 0
COLOR CHANGE: 0
BACK PAIN: 0
SHORTNESS OF BREATH: 0

## 2024-07-16 NOTE — PROGRESS NOTES
Arcenio Encinas  YOB: 1950    Date of Service:  7/16/2024    Chief Complaint:   Arcenio Encinas is a 73 y.o. male who presents for complete physical examination.    HPI:  HPI  Blood pressure today 148/70 and 150/80, taking losartan 25 mg daily every other day, doesn't check readings at home.     Self-testicular exams: Yes  Sexual activity: Yes   Last eye exam: 1.5 months ago,normal  Exercise: calisthenics, weights. runs  Seatbelt use: Yes  Are You a Spiritual person: Yes  Living will: No    Wt Readings from Last 3 Encounters:   07/16/24 79.7 kg (175 lb 9.6 oz)   05/21/24 79 kg (174 lb 3.2 oz)   01/30/24 80.7 kg (178 lb)     BP Readings from Last 3 Encounters:   07/16/24 (!) 150/80   05/21/24 136/70   01/30/24 134/72       Patient Active Problem List   Diagnosis    Chest pain on exertion    Chest pain    ACS (acute coronary syndrome) (HCC)    Abnormal stress test    Coronary artery disease involving native coronary artery of native heart without angina pectoris    S/P CABG x 5    Elevated PSA    Mixed hyperlipidemia    Prostate cancer (HCC)    Psoas abscess (HCC)    Leukocytosis    Fever    Sepsis due to methicillin susceptible Staphylococcus aureus (HCC)    History of prostate cancer    Status post prostatectomy    Coronary artery disease due to lipid rich plaque    Overweight    Complicated UTI (urinary tract infection)    Receiving intravenous antibiotic treatment as outpatient    Medication management    HTN (hypertension)    SOB (shortness of breath)    PVC's (premature ventricular contractions)    Bradycardia       Health Maintenance   Topic Date Due    COVID-19 Vaccine (1) Never done    Pneumococcal 65+ years Vaccine (1 of 2 - PCV) Never done    Respiratory Syncytial Virus (RSV) Pregnant or age 60 yrs+ (1 - 1-dose 60+ series) Never done    Shingles vaccine (2 of 3) 08/06/2014    Flu vaccine (1) 08/01/2024    Prostate Specific Antigen (PSA) Screening or Monitoring  09/27/2024    Lipids  05/22/2025

## 2024-07-17 DIAGNOSIS — C61 PROSTATE CANCER (HCC): ICD-10-CM

## 2024-07-17 DIAGNOSIS — I25.10 CORONARY ARTERY DISEASE INVOLVING NATIVE CORONARY ARTERY OF NATIVE HEART WITHOUT ANGINA PECTORIS: ICD-10-CM

## 2024-07-18 LAB
GLUCOSE P FAST SERPL-MCNC: 85 MG/DL (ref 70–99)
PSA SERPL DL<=0.01 NG/ML-MCNC: 0.02 NG/ML (ref 0–4)

## 2024-07-22 ENCOUNTER — TELEPHONE (OUTPATIENT)
Dept: CARDIOLOGY CLINIC | Age: 74
End: 2024-07-22

## 2024-07-22 NOTE — TELEPHONE ENCOUNTER
Message    ----- Message from Lillian Montelongo sent at 7/22/2024 10:47 AM EDT -----          ----- Message from Arcenio Encinas sent at 7/22/2024 10:35 AM -----   Topic: [facility] Billing Questions.      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:   -results of most recent exam from cardiologist addressing:         -symptoms and diagnosis           -Comorbidities           -Dates of treatment/intervention/surgery           -History (including family history and risk         factors           -Current medications          (name,reason/indication, dosage, frequency          of use, side effects)            -Ongoing treatment/surveillance plan and prognosis            -has applicant completed cardiac rehabilitation (include any cardio rehabilitation notes)                  Please fax this information to [facility]   fax #[phone]   Attn: Dr. Artur Urena      Thank you,   Arcenio Encinas   [phone]

## 2024-07-23 NOTE — TELEPHONE ENCOUNTER
Pt stated that he forgot that the fax needed to include a certain number and wanted to know if we can refax the ppw or send the following information seperatly. Pt needs the ppw to include the following number [id number]. If separate please include pt name and address and fax to [phone] Attention Dr. Artur Urena.

## 2024-07-23 NOTE — TELEPHONE ENCOUNTER
Called and spoke with patient. Number added as previous message and re-faxed as directed by patient

## 2024-07-25 ENCOUNTER — TELEPHONE (OUTPATIENT)
Dept: FAMILY MEDICINE CLINIC | Age: 74
End: 2024-07-25

## 2024-07-25 DIAGNOSIS — I25.10 CORONARY ARTERY DISEASE DUE TO LIPID RICH PLAQUE: Primary | ICD-10-CM

## 2024-07-25 DIAGNOSIS — I25.83 CORONARY ARTERY DISEASE DUE TO LIPID RICH PLAQUE: Primary | ICD-10-CM

## 2024-07-25 NOTE — TELEPHONE ENCOUNTER
Patient just found out that he has to get A1C to renew 's license.  He was just here for PHYSICAL on 7/16/24 with labs.      Can you please order and let MA pool know so we can call him back?

## 2024-07-29 ENCOUNTER — TELEPHONE (OUTPATIENT)
Dept: CARDIOLOGY CLINIC | Age: 74
End: 2024-07-29

## 2024-07-29 DIAGNOSIS — I25.10 CORONARY ARTERY DISEASE INVOLVING NATIVE CORONARY ARTERY OF NATIVE HEART WITHOUT ANGINA PECTORIS: Primary | ICD-10-CM

## 2024-07-29 DIAGNOSIS — I25.10 CORONARY ARTERY DISEASE DUE TO LIPID RICH PLAQUE: ICD-10-CM

## 2024-07-29 DIAGNOSIS — I10 HYPERTENSION, UNSPECIFIED TYPE: ICD-10-CM

## 2024-07-29 DIAGNOSIS — I25.83 CORONARY ARTERY DISEASE DUE TO LIPID RICH PLAQUE: ICD-10-CM

## 2024-07-29 DIAGNOSIS — Z95.1 S/P CABG X 5: ICD-10-CM

## 2024-07-29 NOTE — TELEPHONE ENCOUNTER
Pt called  Left message [facility] voice mail requesting to get a stress test to renew 's license.    Pls advise

## 2024-07-30 ENCOUNTER — TELEPHONE (OUTPATIENT)
Dept: CARDIOLOGY CLINIC | Age: 74
End: 2024-07-30

## 2024-07-30 LAB
EST. AVERAGE GLUCOSE BLD GHB EST-MCNC: 108.3 MG/DL
HBA1C MFR BLD: 5.4 %

## 2024-07-30 NOTE — TELEPHONE ENCOUNTER
Spoke to patient VU that it is okay to take all medications with a sip of water prior to stress test.

## 2024-07-30 NOTE — TELEPHONE ENCOUNTER
Pt is scheduled for nuclear stress test on 8/2 ad he was advised to call us to see what medications he should hold prior. Please advise.

## 2024-08-02 ENCOUNTER — HOSPITAL ENCOUNTER (OUTPATIENT)
Age: 74
Discharge: HOME | End: 2024-08-02
Attending: INTERNAL MEDICINE
Payer: COMMERCIAL

## 2024-08-02 ENCOUNTER — HOSPITAL ENCOUNTER (OUTPATIENT)
Age: 74
End: 2024-08-02
Attending: INTERNAL MEDICINE
Payer: COMMERCIAL

## 2024-08-02 VITALS
WEIGHT: 175 LBS | BODY MASS INDEX: 27.47 KG/M2 | HEIGHT: 67 IN | HEART RATE: 52 BPM | DIASTOLIC BLOOD PRESSURE: 78 MMHG | SYSTOLIC BLOOD PRESSURE: 115 MMHG

## 2024-08-02 DIAGNOSIS — I10 HYPERTENSION, UNSPECIFIED TYPE: ICD-10-CM

## 2024-08-02 DIAGNOSIS — I25.10 CORONARY ARTERY DISEASE INVOLVING NATIVE CORONARY ARTERY OF NATIVE HEART WITHOUT ANGINA PECTORIS: ICD-10-CM

## 2024-08-02 DIAGNOSIS — Z95.1 S/P CABG X 5: ICD-10-CM

## 2024-08-02 LAB
ECHO BSA: 1.94 M2
NUC STRESS EJECTION FRACTION: 55 %
NUC STRESS LV EDV: 150 ML (ref 67–155)
NUC STRESS LV ESV: 68 ML (ref 22–58)
NUC STRESS LV MASS: 173 G
STRESS BASELINE DIAS BP: 83 MMHG
STRESS BASELINE HR: 49 BPM
STRESS BASELINE SYS BP: 132 MMHG
STRESS ESTIMATED WORKLOAD: 13.4 METS
STRESS EXERCISE DUR MIN: 12 MIN
STRESS EXERCISE DUR SEC: 0 SEC
STRESS PEAK DIAS BP: 70 MMHG
STRESS PEAK SYS BP: 214 MMHG
STRESS PERCENT HR ACHIEVED: 92 %
STRESS POST PEAK HR: 137 BPM
STRESS RATE PRESSURE PRODUCT: ABNORMAL BPM*MMHG
STRESS TARGET HR: 149 BPM
TID: 0.93

## 2024-08-02 PROCEDURE — 93017 CV STRESS TEST TRACING ONLY: CPT

## 2024-08-02 PROCEDURE — 78452 HT MUSCLE IMAGE SPECT MULT: CPT | Performed by: INTERNAL MEDICINE

## 2024-08-02 PROCEDURE — 93016 CV STRESS TEST SUPVJ ONLY: CPT | Performed by: INTERNAL MEDICINE

## 2024-08-02 PROCEDURE — 3430000000 HC RX DIAGNOSTIC RADIOPHARMACEUTICAL: Performed by: INTERNAL MEDICINE

## 2024-08-02 PROCEDURE — 78452 HT MUSCLE IMAGE SPECT MULT: CPT

## 2024-08-02 PROCEDURE — A9502 TC99M TETROFOSMIN: HCPCS | Performed by: INTERNAL MEDICINE

## 2024-08-02 PROCEDURE — 93018 CV STRESS TEST I&R ONLY: CPT | Performed by: INTERNAL MEDICINE

## 2024-08-02 RX ADMIN — TETROFOSMIN 31.5 MILLICURIE: 1.38 INJECTION, POWDER, LYOPHILIZED, FOR SOLUTION INTRAVENOUS at 09:13

## 2024-08-02 RX ADMIN — TETROFOSMIN 11.4 MILLICURIE: 1.38 INJECTION, POWDER, LYOPHILIZED, FOR SOLUTION INTRAVENOUS at 07:40

## 2024-08-29 ENCOUNTER — TELEPHONE (OUTPATIENT)
Dept: FAMILY MEDICINE CLINIC | Age: 74
End: 2024-08-29

## 2024-08-29 ENCOUNTER — TELEPHONE (OUTPATIENT)
Dept: CARDIOLOGY CLINIC | Age: 74
End: 2024-08-29

## 2024-08-29 DIAGNOSIS — I49.3 VENTRICULAR ECTOPIC BEATS: Primary | ICD-10-CM

## 2024-08-29 NOTE — TELEPHONE ENCOUNTER
I reviewed patients chart--note on patient's recent stress test in summary section states \"I have asked him to call to move up appointment with EP up to review.\"    Interpretation summary states there were frequent PVCs, couplets and triplets with one 4 beat run of NSVT, appears patient was asymptomatic at time of testing.     KXA-does patient need to move 10/16/2024 appointment with you up? Would you like patient to wear KORIN to quantify PVC burden prior to visit?

## 2024-08-29 NOTE — TELEPHONE ENCOUNTER
Patient called.  He dropped of his be well form this week to be completed.  He is calling for the status.

## 2024-08-29 NOTE — TELEPHONE ENCOUNTER
Pt sts when he had his testing at [facility] he was told by a doctor that he needed to see KXA sooner than 10/16/24 due to results of testing. Pt sts he call our office and tried to get moved up but no opening. Please review testing and if pt needs seen sooner please provide date and time. Pt sts when laying in bed at night he feels a THUMPING in chest. Please advise.

## 2024-08-30 ENCOUNTER — ANCILLARY PROCEDURE (OUTPATIENT)
Dept: CARDIOLOGY CLINIC | Age: 74
End: 2024-08-30
Payer: COMMERCIAL

## 2024-08-30 ENCOUNTER — TELEPHONE (OUTPATIENT)
Dept: CARDIOLOGY CLINIC | Age: 74
End: 2024-08-30

## 2024-08-30 DIAGNOSIS — I49.3 VENTRICULAR ECTOPIC BEATS: ICD-10-CM

## 2024-08-30 NOTE — TELEPHONE ENCOUNTER
Please check on Juani's desk, as I looked today when I was there for the luncheon and it was not on my desk.  Thank you

## 2024-08-30 NOTE — TELEPHONE ENCOUNTER
Monitor placed by [employer]  Monitor company [employer]  Length of monitor 2 weeks  Monitor ordered by KXA  Bluetooth ID [id number]  Activation successful prior to pt leaving office? Yes

## 2024-08-30 NOTE — TELEPHONE ENCOUNTER
Sandor Puga MD  [facility] Ep9 hours ago (10:06 PM)     We can repeat one week monitor to see if anything has changed since his last event monitor and his last office visit. As per prior event monitor, the number of PVC's was very low.     Spoke with patient. Monitor ordered. He will come in today for placement.

## 2024-09-03 NOTE — TELEPHONE ENCOUNTER
Form complete. Form needs to be signed by patient. Left at . We will fax once signature is complete

## 2024-09-27 PROCEDURE — 93228 REMOTE 30 DAY ECG REV/REPORT: CPT | Performed by: INTERNAL MEDICINE

## 2024-10-09 RX ORDER — TICAGRELOR 90 MG/1
90 TABLET ORAL 2 TIMES DAILY
Qty: 60 TABLET | Refills: 10 | Status: SHIPPED | OUTPATIENT
Start: 2024-10-09

## 2024-10-15 ASSESSMENT — ENCOUNTER SYMPTOMS
LEFT EYE: 0
WHEEZING: 0
RIGHT EYE: 0
STRIDOR: 0
SHORTNESS OF BREATH: 0
HEMATOCHEZIA: 0
HEMATEMESIS: 0

## 2024-10-15 NOTE — PROGRESS NOTES
Outpatient Medications   Medication Sig Dispense Refill    BRILINTA 90 MG TABS tablet TAKE ONE TABLET BY MOUTH TWICE A DAY 60 tablet 10    Coenzyme Q10 (COQ-10) 50 MG CAPS capsule Take 1 capsule by mouth daily      losartan (COZAAR) 25 MG tablet Take 1 tablet by mouth daily 90 tablet 3    rosuvastatin (CRESTOR) 5 MG tablet Take 1 tablet by mouth daily 90 tablet 3    Evolocumab (REPATHA SURECLICK) 140 MG/ML SOAJ Inject 140 mg into the skin every 14 days 2 Adjustable Dose Pre-filled Pen Syringe 10    nitroGLYCERIN (NITROSTAT) 0.4 MG SL tablet Place 1 tablet under the tongue every 5 minutes as needed for Chest pain 25 tablet 3    aspirin 81 MG tablet Take 1 tablet by mouth daily 90 tablet 3     No current facility-administered medications for this visit.     Facility-Administered Medications Ordered in Other Visits   Medication Dose Route Frequency Provider Last Rate Last Admin    lidocaine 1 % (PF) injection 0.1-0.5 mL  0.1-0.5 mL IntraDERmal Once Sakshi Neal MD         Lab Review     Lab Results   Component Value Date/Time     10/20/2023 06:54 AM    K 4.8 10/20/2023 06:54 AM     10/20/2023 06:54 AM    CO2 26 10/20/2023 06:54 AM    BUN 18 10/20/2023 06:54 AM    CREATININE 0.9 10/20/2023 06:54 AM    GLUCOSE 121 10/20/2023 06:54 AM    CALCIUM 9.7 10/20/2023 06:54 AM    LABGLOM >60 10/20/2023 06:54 AM      Lab Results   Component Value Date    WBC 5.8 10/20/2023    HGB 14.6 10/20/2023    HCT 43.1 10/20/2023    MCV 86.2 10/20/2023     10/20/2023     Lab Results   Component Value Date    TSH 1.40 08/23/2021     No results found for: \"BNP\"    I, Loyda Galindo RN am scribing for and in the presence of Dr. Sandor Puga. 10/16/24 9:33 AM.

## 2024-10-16 ENCOUNTER — OFFICE VISIT (OUTPATIENT)
Dept: CARDIOLOGY CLINIC | Age: 74
End: 2024-10-16
Payer: COMMERCIAL

## 2024-10-16 VITALS
OXYGEN SATURATION: 99 % | HEART RATE: 44 BPM | DIASTOLIC BLOOD PRESSURE: 78 MMHG | WEIGHT: 178 LBS | BODY MASS INDEX: 27.94 KG/M2 | HEIGHT: 67 IN | SYSTOLIC BLOOD PRESSURE: 132 MMHG

## 2024-10-16 DIAGNOSIS — I10 PRIMARY HYPERTENSION: ICD-10-CM

## 2024-10-16 DIAGNOSIS — R00.1 BRADYCARDIA: ICD-10-CM

## 2024-10-16 DIAGNOSIS — R00.1 SYMPTOMATIC BRADYCARDIA: Primary | ICD-10-CM

## 2024-10-16 DIAGNOSIS — I49.3 PVC'S (PREMATURE VENTRICULAR CONTRACTIONS): ICD-10-CM

## 2024-10-16 DIAGNOSIS — I49.5 SINUS NODE DYSFUNCTION (HCC): ICD-10-CM

## 2024-10-16 PROCEDURE — 1123F ACP DISCUSS/DSCN MKR DOCD: CPT | Performed by: INTERNAL MEDICINE

## 2024-10-16 PROCEDURE — 3075F SYST BP GE 130 - 139MM HG: CPT | Performed by: INTERNAL MEDICINE

## 2024-10-16 PROCEDURE — 3078F DIAST BP <80 MM HG: CPT | Performed by: INTERNAL MEDICINE

## 2024-10-16 PROCEDURE — 99214 OFFICE O/P EST MOD 30 MIN: CPT | Performed by: INTERNAL MEDICINE

## 2024-10-16 PROCEDURE — 93000 ELECTROCARDIOGRAM COMPLETE: CPT | Performed by: INTERNAL MEDICINE

## 2024-10-16 NOTE — PATIENT INSTRUCTIONS
Plan:     Discussed the risks and benefits of a pacemaker. Patient defers at this time.   Avoid negative chronotropic agents.   Follow up with me in 6 months.

## 2024-10-21 ENCOUNTER — TELEPHONE (OUTPATIENT)
Dept: CARDIOLOGY CLINIC | Age: 74
End: 2024-10-21

## 2024-10-21 NOTE — TELEPHONE ENCOUNTER
Pt is a  and he is wanting to get a letter sent to the FAA, regarding his LOV . Pt wants to  in office(Lj). Please let pt know when ready.

## 2024-10-22 NOTE — TELEPHONE ENCOUNTER
Pt returned call. Pt states instead of faxing paperwork he would like to pick it up from the office tomorrow afternoon if possible. Please advise.

## 2024-10-22 NOTE — TELEPHONE ENCOUNTER
I spoke with the patient and he states that he is requesting a letter stating that he has no contraindications to fly per his last monitor and EKG for the FAA. He will also need his monitor and EKG results sent over. Please advise.     Of note, he will call the office back with a fax number to send this information to.

## 2024-10-22 NOTE — TELEPHONE ENCOUNTER
Sandor Puga MD  [facility] Ep1 hour ago (2:39 PM)       We would have to mention the episode of dizziness that he reported to us in December 2023 and mention that there are issues of bradycardia that are continuing although with no reported symptoms more recently. We, unfortunately, cannot just \"clear\" him to fly given the issues previously reported. We can include entire history and it's up to medical team at [facility] to take all into consideration.   Please advise on how you would like this letter worded.

## 2024-10-23 NOTE — TELEPHONE ENCOUNTER
Spoke with SUZANNE. Letter provided indicating patient was evaluated and that records should be reviewed. I supplemented letter with most recent testing for the FAA.

## 2024-11-05 NOTE — TELEPHONE ENCOUNTER
Pt presented himself in office and dropped off a letter with what else he needs sent to the FAA. Placed in KXA folder on 11.5.2024.     The items that are in the highlighted lines.

## 2024-11-07 NOTE — TELEPHONE ENCOUNTER
Spoke with pt, informed pt that we can print off results and fax it. Pt also provided fax number [phone]. PI# [id number].       Will fax ppw.

## 2024-11-07 NOTE — TELEPHONE ENCOUNTER
Pt called and stated that he also needs he results of his last stress test 08/02 included with FAA ppw.

## 2024-11-18 DIAGNOSIS — I25.10 CORONARY ARTERY DISEASE INVOLVING NATIVE CORONARY ARTERY OF NATIVE HEART WITHOUT ANGINA PECTORIS: ICD-10-CM

## 2024-11-18 DIAGNOSIS — E78.2 MIXED HYPERLIPIDEMIA: ICD-10-CM

## 2024-11-18 DIAGNOSIS — Z95.1 S/P CABG X 5: ICD-10-CM

## 2024-11-18 RX ORDER — EVOLOCUMAB 140 MG/ML
140 INJECTION, SOLUTION SUBCUTANEOUS
Qty: 2 ADJUSTABLE DOSE PRE-FILLED PEN SYRINGE | Refills: 10 | Status: SHIPPED | OUTPATIENT
Start: 2024-11-18 | End: 2024-11-20 | Stop reason: SDUPTHER

## 2024-11-20 ENCOUNTER — TELEPHONE (OUTPATIENT)
Dept: PHARMACY | Age: 74
End: 2024-11-20

## 2024-11-20 ENCOUNTER — TELEPHONE (OUTPATIENT)
Dept: CARDIOLOGY CLINIC | Age: 74
End: 2024-11-20

## 2024-11-20 DIAGNOSIS — E78.2 MIXED HYPERLIPIDEMIA: ICD-10-CM

## 2024-11-20 DIAGNOSIS — Z95.1 S/P CABG X 5: ICD-10-CM

## 2024-11-20 DIAGNOSIS — I25.10 CORONARY ARTERY DISEASE INVOLVING NATIVE CORONARY ARTERY OF NATIVE HEART WITHOUT ANGINA PECTORIS: ICD-10-CM

## 2024-11-20 NOTE — TELEPHONE ENCOUNTER
Specialty Medication Service    Date: 11/20/2024  Patient's Name: Arcenio Encinas YOB: 1950            _____________________________________________________________________________________________    Arcenio Encinas is a 74 y.o. male enrolled in the Specialty Medication Service.     We received a refill request for repatha on 11/20/24.     Original Rx was written for 10+1 fills on 11/18/24.     Thank you,    Selene Osman      Requested Prescriptions     Pending Prescriptions Disp Refills    Evolocumab (REPATHA SURECLICK) 140 MG/ML SOAJ 2 Adjustable Dose Pre-filled Pen Syringe 10     Sig: Inject 140 mg into the skin every 14 days

## 2024-11-20 NOTE — TELEPHONE ENCOUNTER
Pt presented himself in office and stated that the FAA didn't have everything they needed from our office. Pt stated that there was a faxed sent to our office. Pt was advised that we would call him if we need any information about fax.

## 2024-11-20 NOTE — TELEPHONE ENCOUNTER
Spoke with Bernardo from FAA, Bernardo stated that ppw is in review.     Spoke with pt, informed pt what Bernardo said. Pt stated that our office needs to contact. Blanca [phone] fax# [phone]. Pt stated that our office should received a fax regarding what is needed.    Attempted to contact Blanca regarding pt. [facility] for call back.

## 2024-11-21 ENCOUNTER — TELEPHONE (OUTPATIENT)
Dept: PHARMACY | Age: 74
End: 2024-11-21

## 2024-11-21 RX ORDER — EVOLOCUMAB 140 MG/ML
140 INJECTION, SOLUTION SUBCUTANEOUS
Qty: 2 ADJUSTABLE DOSE PRE-FILLED PEN SYRINGE | Refills: 10 | Status: SHIPPED | OUTPATIENT
Start: 2024-11-21

## 2024-11-21 NOTE — TELEPHONE ENCOUNTER
Specialty Medication Service    Date: 11/21/2024  Patient's Name: Arcenio Encinas YOB: 1950            _____________________________________________________________________________________________    Spoke with patient to schedule PharmD follow up appointment for Specialty Medication Services. Patient scheduled 12/17/24 at 930 am.      Juani Dumont CPhT  Pharmacy   Specialty Medication Services   Phone: [phone] option 4    For Pharmacy Admin Tracking Only    Program: [facility]  CPA in place:  Yes  Recommendation Provided To: Patient/Caregiver: 1 via Telephone  Intervention Detail: Scheduled Appointment  Intervention Accepted By: Patient/Caregiver: 1  Gap Closed?:    Time Spent (min): 15

## 2024-11-21 NOTE — TELEPHONE ENCOUNTER
Pt called for status update. Advised that we LVM for Blanca to call office back. He will also reach out to Blanca.

## 2024-11-21 NOTE — TELEPHONE ENCOUNTER
Specialty Medication Service    Date: 11/21/2024  Patient's Name: Arcenio Encinas YOB: 1950            _____________________________________________________________________________________________     Arcenio Encians is a 74 y.o.  male enrolled in the Specialty Medication Service program. Refill request received for Repatha.    Patient does have active CPA on file.   Patient last SMS pharmacist visit was within the last 6 months.  Patient last medical director visit was within the last year.  Patient has seen their specialist within the last year.   Labs were reviewed.   SMS medical director referral is on file: yes  Next SMS visit is anticipated for December 2024.   Correct [facility] department for prescribing yes    Chart reviewed. No significant concerns noted, patient is compliant with the program.     Will approve the refill for #2 mL + 10 refills, per the original provider order.      Orders Placed This Encounter    Evolocumab (REPATHA SURECLICK) 140 MG/ML SOAJ     Sig: Inject 140 mg into the skin every 14 days     Dispense:  2 Adjustable Dose Pre-filled Pen Syringe     Refill:  10     Namrata Mancilla PharmD  Ambulatory Clinical Pharmacist   Specialty Medication Services   Phone: [phone] option 4  11/21/2024 8:29 AM     For Pharmacy Admin Tracking Only    Program: [facility]  CPA in place:  Yes  Recommendation Provided To: Patient/Caregiver: 1 via Telephone  Intervention Detail: Refill(s) Provided  Intervention Accepted By: Patient/Caregiver: 1   Time Spent (min): 10

## 2024-11-22 NOTE — TELEPHONE ENCOUNTER
Maritza pulled records and faxed to [facility].     [facility] for pt stating we faxed paperwork and have copies here if he wants to . Leaving at .

## 2024-11-26 NOTE — TELEPHONE ENCOUNTER
Called and spoke w/ FAA and was advised best option would be for pt to  packet and overnight due to several unsuccessful attempts at faxing.     Called and relayed message to pt. V/U and stated he would come pick packet up tomorrow morning.

## 2024-12-17 ENCOUNTER — PHARMACY VISIT (OUTPATIENT)
Dept: PHARMACY | Age: 74
End: 2024-12-17

## 2024-12-17 DIAGNOSIS — I25.10 CORONARY ARTERY DISEASE INVOLVING NATIVE CORONARY ARTERY OF NATIVE HEART WITHOUT ANGINA PECTORIS: Primary | ICD-10-CM

## 2024-12-17 DIAGNOSIS — E78.2 MIXED HYPERLIPIDEMIA: ICD-10-CM

## 2024-12-17 ASSESSMENT — PROMIS GLOBAL HEALTH SCALE
IN GENERAL, PLEASE RATE HOW WELL YOU CARRY OUT YOUR USUAL SOCIAL ACTIVITIES (INCLUDES ACTIVITIES AT HOME, AT WORK, AND IN YOUR COMMUNITY, AND RESPONSIBILITIES AS A PARENT, CHILD, SPOUSE, EMPLOYEE, FRIEND, ETC) [ON A SCALE OF 1 (POOR) TO 5 (EXCELLENT)]?: VERY GOOD
IN GENERAL, WOULD YOU SAY YOUR QUALITY OF LIFE IS...[ON A SCALE OF 1 (POOR) TO 5 (EXCELLENT)]: VERY GOOD
IN GENERAL, HOW WOULD YOU RATE YOUR SATISFACTION WITH YOUR SOCIAL ACTIVITIES AND RELATIONSHIPS [ON A SCALE OF 1 (POOR) TO 5 (EXCELLENT)]?: VERY GOOD
IN THE PAST 7 DAYS, HOW WOULD YOU RATE YOUR FATIGUE ON AVERAGE [ON A SCALE FROM 1 (NONE) TO 5 (VERY SEVERE)]?: MILD
IN THE PAST 7 DAYS, HOW WOULD YOU RATE YOUR PAIN ON AVERAGE [ON A SCALE FROM 0 (NO PAIN) TO 10 (WORST IMAGINABLE PAIN)]?: 1
IN GENERAL, WOULD YOU SAY YOUR HEALTH IS...[ON A SCALE OF 1 (POOR) TO 5 (EXCELLENT)]: VERY GOOD
IN GENERAL, HOW WOULD YOU RATE YOUR PHYSICAL HEALTH [ON A SCALE OF 1 (POOR) TO 5 (EXCELLENT)]?: VERY GOOD
SUM OF RESPONSES TO QUESTIONS 2, 4, 5, & 10: 17
SUM OF RESPONSES TO QUESTIONS 3, 6, 7, & 8: 14
TO WHAT EXTENT ARE YOU ABLE TO CARRY OUT YOUR EVERYDAY PHYSICAL ACTIVITIES SUCH AS WALKING, CLIMBING STAIRS, CARRYING GROCERIES, OR MOVING A CHAIR [ON A SCALE OF 1 (NOT AT ALL) TO 5 (COMPLETELY)]?: COMPLETELY
IN GENERAL, HOW WOULD YOU RATE YOUR MENTAL HEALTH, INCLUDING YOUR MOOD AND YOUR ABILITY TO THINK [ON A SCALE OF 1 (POOR) TO 5 (EXCELLENT)]?: EXCELLENT
IN THE PAST 7 DAYS, HOW OFTEN HAVE YOU BEEN BOTHERED BY EMOTIONAL PROBLEMS, SUCH AS FEELING ANXIOUS, DEPRESSED, OR IRRITABLE [ON A SCALE FROM 1 (NEVER) TO 5 (ALWAYS)]?: RARELY

## 2024-12-17 ASSESSMENT — PATIENT HEALTH QUESTIONNAIRE - PHQ9
SUM OF ALL RESPONSES TO PHQ QUESTIONS 1-9: 0
1. LITTLE INTEREST OR PLEASURE IN DOING THINGS: NOT AT ALL
SUM OF ALL RESPONSES TO PHQ QUESTIONS 1-9: 0
SUM OF ALL RESPONSES TO PHQ9 QUESTIONS 1 & 2: 0
2. FEELING DOWN, DEPRESSED OR HOPELESS: NOT AT ALL

## 2024-12-17 NOTE — PROGRESS NOTES
panel being well controlled. He is concerned about cholesterol being too low. Has not discussed with cardiologist. Recommended patient discuss concomitant statin and PCSK-9 therapy with his cardiologist at next follow-up.   Refills - Patient does have remaining fills on SMS RX for Repatha at [facility]. There are two fills left.    Referral - specialist to [facility] referral received on 11/28/23 and is in referral tab.   SMS Follow-up visits - Patient is due for SMS MD visit in January. Patient was last seen by Dr. Shea on 1/8/24. Patient is due for six month pharmD follow on 6/17/25. Patient has been scheduled with SMS MD for 1/13/25 at 3:45 PM.   Patient has completed [facility] consent form. No questions in regard to consent form. Read consent form to patient and obtained a verbal agreement.    Patient has been updated in [facility] therapy management program.     PLAN  Goals of therapy, common side effects, medication storage, and administration reviewed with patient.  Continue Repatha 140mg every 14 days as prescribed  Recommended lab monitoring: none at this time   Recommended vaccinations: Shingrix and Prevnar-20  Keep all scheduled appointments. Return to clinic in 6 months or call with any questions or concerns.      Namrata Mancilla PharmD  Ambulatory Clinical Pharmacist   Specialty Medication Services   Phone: [phone] option 4  12/18/2024 10:34 AM    For Pharmacy Admin Tracking Only    Program: [facility]  CPA in place:  Yes  Recommendation Provided To: Patient/Caregiver: 3 via Virtual Visit  Intervention Detail: Referral to Other Provider, Scheduled Appointment, and Vaccine Recommended/Administered  Intervention Accepted By: Patient/Caregiver: 2   Time Spent (min): 60    Arcenio Encinas was evaluated through a synchronous (real-time) audio encounter. Patient identification was verified at the start of the visit. He (or guardian if applicable) is aware that this is a billable service, which

## 2025-01-13 ENCOUNTER — PHARMACY VISIT (OUTPATIENT)
Dept: PHARMACY | Age: 75
End: 2025-01-13

## 2025-01-13 DIAGNOSIS — I25.10 CORONARY ARTERY DISEASE INVOLVING NATIVE CORONARY ARTERY OF NATIVE HEART WITHOUT ANGINA PECTORIS: Primary | ICD-10-CM

## 2025-01-13 DIAGNOSIS — E78.2 MIXED HYPERLIPIDEMIA: ICD-10-CM

## 2025-01-13 NOTE — PROGRESS NOTES
I called the patient  for annual follow-up for the [facility] subspecialty pharmacy program.    The patient has a history of coronary artery disease and hyperlipidemia. The patient is under the care of cardiology.    The patient says the medication is working well. The patient has no major side effects from the medication. The patient takes Repatha.  The patient is getting lab work regularly.    The patient has had no issues getting the medication from the pharmacy.    I told the patient that primary management will be by the patient's specialist. The patient verbalized understanding.  I told the patient that I collaborate closely with the pharmacist in the care of the patient and to contact us with any issues.       GARETT CHILDRESS MD 1/13/2025

## 2025-01-30 ENCOUNTER — OFFICE VISIT (OUTPATIENT)
Dept: ENT CLINIC | Age: 75
End: 2025-01-30
Payer: COMMERCIAL

## 2025-01-30 ENCOUNTER — PROCEDURE VISIT (OUTPATIENT)
Dept: AUDIOLOGY | Age: 75
End: 2025-01-30
Payer: COMMERCIAL

## 2025-01-30 VITALS
DIASTOLIC BLOOD PRESSURE: 75 MMHG | BODY MASS INDEX: 28.61 KG/M2 | HEART RATE: 54 BPM | SYSTOLIC BLOOD PRESSURE: 135 MMHG | WEIGHT: 178 LBS | HEIGHT: 66 IN | TEMPERATURE: 97.3 F

## 2025-01-30 DIAGNOSIS — H90.3 SENSORINEURAL HEARING LOSS (SNHL) OF BOTH EARS: Primary | ICD-10-CM

## 2025-01-30 PROCEDURE — 92557 COMPREHENSIVE HEARING TEST: CPT | Performed by: AUDIOLOGIST

## 2025-01-30 PROCEDURE — 3078F DIAST BP <80 MM HG: CPT | Performed by: OTOLARYNGOLOGY

## 2025-01-30 PROCEDURE — 99204 OFFICE O/P NEW MOD 45 MIN: CPT | Performed by: OTOLARYNGOLOGY

## 2025-01-30 PROCEDURE — 92567 TYMPANOMETRY: CPT | Performed by: AUDIOLOGIST

## 2025-01-30 PROCEDURE — 3075F SYST BP GE 130 - 139MM HG: CPT | Performed by: OTOLARYNGOLOGY

## 2025-01-30 PROCEDURE — 1123F ACP DISCUSS/DSCN MKR DOCD: CPT | Performed by: OTOLARYNGOLOGY

## 2025-01-30 ASSESSMENT — ENCOUNTER SYMPTOMS
NAUSEA: 0
VOICE CHANGE: 0
COUGH: 0
SORE THROAT: 0
EYE REDNESS: 0
CHOKING: 0
EYE PAIN: 0
SHORTNESS OF BREATH: 0
RHINORRHEA: 0
TROUBLE SWALLOWING: 0
FACIAL SWELLING: 0
DIARRHEA: 0
SINUS PRESSURE: 0
EYE ITCHING: 0
SINUS PAIN: 0

## 2025-01-30 NOTE — PROGRESS NOTES
Subjective:      Patient ID: Arcenio Encinas is a 74 y.o. male.    HPI  Hearing Loss HPI  CC: hearing loss    General: Arcenio is a(n) 74 y.o. male who presents with a recent history of hearing loss. Had work audiogram years ago he reported as normal. Some family with hearing loss. Was with friends and did not hear a tone others did. Has tractor. Does not have problems in social situation.   How long: unsure   Side:bilateral  Prior audiogram:Yes   When:20+ years ago   Where:work  Previous episodes: no  Tinnitus:No  Otorrhea:No  Vertigo:No  Prior ear surgery: No  Ear trauma: No  History of hearing loss: No      Patient Active Problem List   Diagnosis    Chest pain on exertion    Chest pain    ACS (acute coronary syndrome) (HCC)    Abnormal stress test    Coronary artery disease involving native coronary artery of native heart without angina pectoris    S/P CABG x 5    Elevated PSA    Mixed hyperlipidemia    Prostate cancer (HCC)    Psoas abscess (HCC)    Leukocytosis    Fever    Sepsis due to methicillin susceptible Staphylococcus aureus (HCC)    History of prostate cancer    Status post prostatectomy    Coronary artery disease due to lipid rich plaque    Overweight    Complicated UTI (urinary tract infection)    Receiving intravenous antibiotic treatment as outpatient    Medication management    HTN (hypertension)    SOB (shortness of breath)    PVC's (premature ventricular contractions)    Bradycardia     Past Surgical History:   Procedure Laterality Date    CARDIAC CATHETERIZATION  12/28/2015    Dr Mason    COLONOSCOPY  1-10-11    COLONOSCOPY N/A 11/29/2021    COLONOSCOPY POLYPECTOMY SNARE/COLD BIOPSY performed by Job Timmons MD at [facility] ENDOSCOPY    CORONARY ARTERY BYPASS GRAFT  12.30.2015    [facility]    DENTAL SURGERY      PROSTATECTOMY N/A 1/28/2019    ROBOTIC LAPAROSCOPIC RADICAL PROSTATECTOMY WITH BILATERAL LYMPH NODE DISSECTION AND RIGHT SIDE NERVE SPARING performed by Wood Brown MD at [facility] OR

## 2025-01-30 NOTE — PROGRESS NOTES
Normal peak pressure and compliance, Type A tympanogram, consistent with normal middle ear function.       Reliability: Good   Transducer: HF Headphones    See scanned audiogram dated 1/30/2025 for results.      PATIENT EDUCATION:       The following items were discussed with the patient:   - Good Communication Strategies  - Hearing Loss and Hearing Aids  - Noise-Induced Hearing Loss and use of Hearing Protection Devices (HPDs)     Educational information was shared in the After Visit Summary.                                                RECOMMENDATIONS:                                                                                                                                                                                                                                                            The following items are recommended based on patient report and results from today's appointment:  - Continue medical follow-up with Santos Montenegro MD, PhD.  - Retest hearing as medically indicated and/or sooner if a change in hearing is noted.  - If desired, schedule a Hearing Aid Evaluation (HAE) appointment to discuss hearing aid options.  - Utilize \"Good Communication Strategies\" as discussed to assist in speech understanding with communication partners.  - Use hearing protection devices (HPDs), such as protective ear muffs and ear plugs, when exposed to dangerous sound levels.        Emigdio Ervin  Audiologist       Chart CC'd to: Santos Montenegro MD, PhD        Degree of   Hearing Sensitivity dB Range   Within Normal Limits (WNL) 0 - 20   Mild 20 - 40   Moderate 40 - 55   Moderately-Severe 55 - 70   Severe 70 - 90   Profound 90 +     Portions of this note were dictated using Dragon. There may be linguistic errors secondary to the use of this program.

## 2025-02-11 ENCOUNTER — OFFICE VISIT (OUTPATIENT)
Age: 75
End: 2025-02-11
Payer: COMMERCIAL

## 2025-02-11 DIAGNOSIS — L82.1 SK (SEBORRHEIC KERATOSIS): Primary | ICD-10-CM

## 2025-02-11 DIAGNOSIS — B00.89 HERPES SIMPLEX VIRUS (HSV) INFECTION OF BUTTOCK: ICD-10-CM

## 2025-02-11 DIAGNOSIS — D22.9 MULTIPLE NEVI: ICD-10-CM

## 2025-02-11 DIAGNOSIS — L57.0 AK (ACTINIC KERATOSIS): ICD-10-CM

## 2025-02-11 PROCEDURE — 99213 OFFICE O/P EST LOW 20 MIN: CPT | Performed by: DERMATOLOGY

## 2025-02-11 PROCEDURE — 1123F ACP DISCUSS/DSCN MKR DOCD: CPT | Performed by: DERMATOLOGY

## 2025-02-11 RX ORDER — FLUOROURACIL 50 MG/G
CREAM TOPICAL
Qty: 40 G | Refills: 0 | Status: SHIPPED | OUTPATIENT
Start: 2025-02-11

## 2025-02-11 RX ORDER — VALACYCLOVIR HYDROCHLORIDE 500 MG/1
500 TABLET, FILM COATED ORAL 2 TIMES DAILY
Qty: 24 TABLET | Refills: 1 | Status: SHIPPED | OUTPATIENT
Start: 2025-02-11

## 2025-02-11 NOTE — PROGRESS NOTES
[facility]  Ayaan De Jesus MD  [phone]      Arcenio Encinas  1950    74 y.o. male     Date of Visit: 2/11/2025    Chief Complaint: Follow-up for AK's, skin lesions    History of Present Illness:    1.  He reports an enlarging lesion on the left clavicular region that is asymptomatic.    2.  He also presents today for evaluation of multiple moles - not aware of any changes in size, color or shape.       3.  He also today to follow-up for multiple actinic keratoses on the lateral portions of the cheeks and also on the left earlobe.  He reports improvement with Efudex cream in the past.    4.  He has a history of a recurrent pruritic eruption on the left superior medial buttocks.  Has hx of genital HSV infection.      Dermatologic history:     He has a longstanding history of lichen planopilaris of the scalp that remains stable, inactive and asymptomatic.      He has a history of a moderately dysplastic nevus on the right upper chest (2/7/2023).     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  Loves to fly.    Review of Systems:  Gen: Feels well, good sense of health.    Past Medical History, Family History, Surgical History, Medications and Allergies reviewed.    Past Medical History:   Diagnosis Date    CAD (coronary artery disease)     HTN (hypertension) 10/3/2023    Mixed hyperlipidemia 11/26/2018    Prostate cancer (HCC) 1/21/2019     Past Surgical History:   Procedure Laterality Date    CARDIAC CATHETERIZATION  12/28/2015    Dr Mason    COLONOSCOPY  1-10-11    COLONOSCOPY N/A 11/29/2021    COLONOSCOPY POLYPECTOMY SNARE/COLD BIOPSY performed by Job Timmons MD at [facility] END[facility]COPY    CORONARY ARTERY BYPASS GRAFT  12.30.2015    [place]    DENTAL SURGERY      PROSTATECTOMY N/A 1/28/2019    ROBOTIC LAPAROSCOPIC RADICAL PROSTATECTOMY WITH BILATERAL LYMPH NODE DISSECTION AND RIGHT SIDE NERVE SPARING performed by

## 2025-02-18 ENCOUNTER — TELEPHONE (OUTPATIENT)
Dept: CARDIOLOGY CLINIC | Age: 75
End: 2025-02-18

## 2025-02-18 DIAGNOSIS — R00.1 BRADYCARDIA: Primary | ICD-10-CM

## 2025-02-18 NOTE — TELEPHONE ENCOUNTER
Pt returend call. Kxa message given. Pt would like to speak with med staff. He can be reached at [phone]

## 2025-02-18 NOTE — TELEPHONE ENCOUNTER
I spoke with the patient and scheduled him for monitor on 02/19/2025. After monitor completion he will need a letter regarding detailed discussion regarding indication for pacemaker. The letter needs to indicate whether or not you find the pacemaker medically necessary. The letter cannot state that the decision for a pacemaker is being left up to the patient.

## 2025-02-18 NOTE — TELEPHONE ENCOUNTER
Pt received a letter from the FAA stating they want pt to monitor study(at least 14 days). They also wants a letter regarding the need for pacemaker, which pt does not want and has discussed this with KXA in the past. Please advise.

## 2025-02-19 ENCOUNTER — TELEPHONE (OUTPATIENT)
Dept: CARDIOLOGY CLINIC | Age: 75
End: 2025-02-19

## 2025-02-19 ENCOUNTER — ANCILLARY PROCEDURE (OUTPATIENT)
Dept: CARDIOLOGY CLINIC | Age: 75
End: 2025-02-19

## 2025-02-19 DIAGNOSIS — R00.1 BRADYCARDIA: ICD-10-CM

## 2025-02-19 NOTE — TELEPHONE ENCOUNTER
Sandor Puga MD  [facility] Ep14 hours ago (6:13 PM)       We can complete the monitor. However, barring any clear change in findings on monitor (from prior monitor), it will again be up to the patient to decide. It’s not black or white so far in his case   Noted, will await results from KORIN.

## 2025-02-19 NOTE — TELEPHONE ENCOUNTER
Monitor placed by kylea  Monitor company   Length of monitor 14 days  Monitor ordered by kxa  Phone ID [email or web address]  First Patch ID [id number]  Activation successful prior to pt leaving office? Yes

## 2025-02-19 NOTE — TELEPHONE ENCOUNTER
Pt is present today to get his monitor placed. The pt brought the FAA letter of what they are requesting and in their preferred format, and the pt also brought a personal letter for KXA as well that helps explain things. Once this has been completed please call pt for pickup at [phone].

## 2025-02-19 NOTE — TELEPHONE ENCOUNTER
/Pt presented himself in office today to get FAA ppw completed. Pt wants first two bullets done by VSP. Last two bullets have already been done by SUZANNE . Placed In VSP's folder on 02/19/25.

## 2025-02-20 NOTE — TELEPHONE ENCOUNTER
Ppw is for medical records,spoke with pt, pt v/u    Ppw placed in [facility]  Pt sts if lulu has any questions to feel free to call him

## 2025-02-21 NOTE — TELEPHONE ENCOUNTER
I will request the cath and stress CD's (the first two bullets??) After the monitor study is completed and Dr Puga gives his narrative I will contact pt to come and  the info.

## 2025-03-12 ENCOUNTER — RESULTS FOLLOW-UP (OUTPATIENT)
Dept: CARDIOLOGY CLINIC | Age: 75
End: 2025-03-12

## 2025-03-12 NOTE — TELEPHONE ENCOUNTER
I spoke with the patient and relayed KXA message. Letter created. Maritza, please download letter and upload it to patient current CD record that was downloaded for VSP.

## 2025-03-12 NOTE — TELEPHONE ENCOUNTER
Pt stated the 2025 KORIN results was not on the CD and pt stated that there needs to be a \"narrative\" of KXA on the CD as well. Please advise

## 2025-03-12 NOTE — TELEPHONE ENCOUNTER
----- Message from Dr. Sandor Puga MD sent at 3/12/2025 12:04 AM EDT -----  Regarding: FW:  Please let patient know that his event monitor showed somewhat similar findings to prior event monitors. There are no long pauses or significant conduction abnormalities. However, he does have frequent slow heart rates and episodes of rapid heart beat from the ventricle that would ideally be treated with beta blocker medications (but unable to do so because his heart rate cannot tolerate it).    Regarding his request for a letter about flying, we can provide a similar letter to what we previously discussed. It would be something as below:    “Patient with frequent episodes of slow heart beat (bradycardia) but no long pauses or high grade AV block. He has not had any recent reported syncope or near syncope. There is no current class I indication for pacemaker”.    If that works for him, we can go ahead and issue such a letter.    Thank you  ----- Message -----  From: Sandor Puga MD  Sent: 3/11/2025   2:18 PM EDT  To: Sandor Puga MD

## 2025-03-12 NOTE — TELEPHONE ENCOUNTER
Pt stated he picked up CD and on the CD it says Cath 2024 and wants to make sure the KORIN from 2025 is on the CD. Pt was advised to look at CD to see and can our office back if not and that a message would be sent to our records to make sure everything was on the CD for pt. Pt v/u

## 2025-04-14 ENCOUNTER — TELEPHONE (OUTPATIENT)
Dept: CARDIOLOGY CLINIC | Age: 75
End: 2025-04-14

## 2025-04-14 DIAGNOSIS — Z79.899 MEDICATION MANAGEMENT: Primary | ICD-10-CM

## 2025-04-14 DIAGNOSIS — I10 HYPERTENSION, UNSPECIFIED TYPE: ICD-10-CM

## 2025-04-14 RX ORDER — LOSARTAN POTASSIUM 25 MG/1
25 TABLET ORAL DAILY
Qty: 90 TABLET | Refills: 0 | Status: SHIPPED | OUTPATIENT
Start: 2025-04-14

## 2025-04-14 NOTE — TELEPHONE ENCOUNTER
Called and spoke with patient. CMP and fasting lipids ordered. Patient VU to have obtained. Further refills will be provided at office visit.

## 2025-04-14 NOTE — TELEPHONE ENCOUNTER
Refill  losartan (COZAAR) 25 MG tablet   [facility] - [place], [place] - [address], [address] - P [phone] - f [phone]     Lov 5/21/24  Next 5/5/25

## 2025-04-21 NOTE — PROGRESS NOTES
[facility]   Cardiovascular Evaluation    PATIENT: Arcenio Encinas  DATE: 2025  MRN: [id number]  CSN: [id number]  : 1950    Primary Care Doctor: Tyree Negrete DO    Reason for evaluation:   Follow-up, Coronary Artery Disease, and Hyperlipidemia      History of present illness on initial date of evaluation:   Arcenio Encinas is a 74 y.o. patient who presents who presents for follow up. He has a significant medical history including CAD, s/p CABG x 5, and HLD. He had a left heart catheterization 2015 which showed multivessel CAD, not favorable of PCI. He therefore underwent underwent CABG x 5 with  on 2015. He had a GXT plain stress test 10/25/2016 which was normal. His echocardiogram 2016 EF 55%, mild MR, and mild TR.    He had an Lexiscan stress Myoview 10/09/23 inferolateral hypokinesis/scar with ignacio infarct ischemia. Therefore he proceeded with elective LHC and RHC on 10/20/23 that resulted in PCI of CIRC w/ MOMO x 3. Noted he was bradycardic, no beta blockers. Echo 10/27/23 EF 50-55%, Grade I DD, mild MR, and mild left atrial enlargement.    He was seen by  for PVC's and bradycardia. Patient wore a cardiac event monitor from 2023 to 2023 which demonstrated predominately SB with an average HR of 56. Discussed pacemaker insertion and routine follow up with Electrophysiology.    Since last office visit 24 he had a GXT stress myoview 2024 for  licensure renewal showing small sized, ficed inferior lateral scar no ischemia. He was instructed to follow with EP. He wore a KORIN -2024 showed SR, rare PAC, rare PVC, NSVT. He wore another KORIN 25-25 that showed predominately SB with an average HR of 55 () BPM. PAC burden 0.03%, PVC burden 2.88%.    Today he reports he is still waiting for his  licensure approval. He is not taking rosuvastatin.Taking all other medications as prescribed,

## 2025-04-30 ENCOUNTER — OFFICE VISIT (OUTPATIENT)
Dept: CARDIOLOGY CLINIC | Age: 75
End: 2025-04-30
Payer: MEDICARE

## 2025-04-30 VITALS
BODY MASS INDEX: 27.97 KG/M2 | SYSTOLIC BLOOD PRESSURE: 132 MMHG | WEIGHT: 174 LBS | HEIGHT: 66 IN | OXYGEN SATURATION: 99 % | HEART RATE: 49 BPM | DIASTOLIC BLOOD PRESSURE: 60 MMHG

## 2025-04-30 DIAGNOSIS — I10 PRIMARY HYPERTENSION: ICD-10-CM

## 2025-04-30 DIAGNOSIS — I49.3 PVC'S (PREMATURE VENTRICULAR CONTRACTIONS): ICD-10-CM

## 2025-04-30 DIAGNOSIS — I25.10 CORONARY ARTERY DISEASE INVOLVING NATIVE CORONARY ARTERY OF NATIVE HEART WITHOUT ANGINA PECTORIS: ICD-10-CM

## 2025-04-30 DIAGNOSIS — R00.1 BRADYCARDIA: Primary | ICD-10-CM

## 2025-04-30 LAB
EKG ATRIAL RATE: 49 BPM
EKG DIAGNOSIS: NORMAL
EKG P AXIS: 26 DEGREES
EKG P-R INTERVAL: 164 MS
EKG Q-T INTERVAL: 430 MS
EKG QRS DURATION: 92 MS
EKG QTC CALCULATION (BAZETT): 388 MS
EKG R AXIS: -1 DEGREES
EKG T AXIS: 43 DEGREES
EKG VENTRICULAR RATE: 49 BPM

## 2025-04-30 PROCEDURE — 1123F ACP DISCUSS/DSCN MKR DOCD: CPT | Performed by: INTERNAL MEDICINE

## 2025-04-30 PROCEDURE — 3078F DIAST BP <80 MM HG: CPT | Performed by: INTERNAL MEDICINE

## 2025-04-30 PROCEDURE — G2211 COMPLEX E/M VISIT ADD ON: HCPCS | Performed by: INTERNAL MEDICINE

## 2025-04-30 PROCEDURE — 3017F COLORECTAL CA SCREEN DOC REV: CPT | Performed by: INTERNAL MEDICINE

## 2025-04-30 PROCEDURE — G8427 DOCREV CUR MEDS BY ELIG CLIN: HCPCS | Performed by: INTERNAL MEDICINE

## 2025-04-30 PROCEDURE — G8419 CALC BMI OUT NRM PARAM NOF/U: HCPCS | Performed by: INTERNAL MEDICINE

## 2025-04-30 PROCEDURE — 99214 OFFICE O/P EST MOD 30 MIN: CPT | Performed by: INTERNAL MEDICINE

## 2025-04-30 PROCEDURE — 1159F MED LIST DOCD IN RCRD: CPT | Performed by: INTERNAL MEDICINE

## 2025-04-30 PROCEDURE — 1036F TOBACCO NON-USER: CPT | Performed by: INTERNAL MEDICINE

## 2025-04-30 PROCEDURE — 3075F SYST BP GE 130 - 139MM HG: CPT | Performed by: INTERNAL MEDICINE

## 2025-04-30 ASSESSMENT — ENCOUNTER SYMPTOMS
HEMATOCHEZIA: 0
STRIDOR: 0
WHEEZING: 0
LEFT EYE: 0
SHORTNESS OF BREATH: 0
HEMATEMESIS: 0
RIGHT EYE: 0

## 2025-04-30 NOTE — PROGRESS NOTES
44 BPM  PAC's / PVC's present: None  Conduction abnormalities: Normal AV conduction    ECG Interpretation:  (Date: 01/17/2024)  Rhythm: Sinus rhythm  Rate: 50 BPM  PAC's / PVC's present: None  Conduction abnormalities: Normal AV conduction    Echocardiogram  (Date: 10/27/2023)   Summary   Low normal global systolic function with an ejection fraction estimated at   50-55%.   No obvious regional wall motion abnormalities noted.   Grade I diastolic dysfunction with normal LV filling pressures. Avg.   E/e'=7.1   Mild mitral regurgitation.   Mild left atrial enlargement noted.    Stress Test (Date: 08/02/2024)    Stress Combined Conclusion: Perfusion study with small area of inferior-lateral scar with preserved LV function. Frequent ventricular ectopy. Intermediate risk.    Perfusion Comments: LV perfusion is abnormal. There is a small sized, moderate intensity, fixed inferior-lateral defect c/w scar. There is no ischemia.    Stress Function: Left ventricular function post-stress is normal. No regional wall motion abnormalities. Post-stress ejection fraction is 55%. Stress end diastolic volume: 150 mL. Stress end systolic volume: 68 mL. LV mass: 173.0 g.    Stress ECG: Arrhythmias during stress: frequent PVCs. Ventricular couplets and triplets. One 4 beat NSVT. These mainly occured early in exercise and resolved with more strenuous exercise. Asymptomatic.    Perfusion Conclusion: TID ratio is 0.93.    ECG: Resting ECG demonstrates normal sinus rhythm.    ECG: The stress ECG was negative for ischemia.    Stress Test (Date: 10/09/2023)   Summary   Low normal LVEF 59%   Inferolateral hypokinesis/scar with ignacio-infarct ischemia      Overall, this would be considered an abnormal, intermediate risk, study    Current Medications     Current Outpatient Medications   Medication Sig Dispense Refill    losartan (COZAAR) 25 MG tablet Take 1 tablet by mouth daily 90 tablet 0    valACYclovir (VALTREX) 500 MG tablet Take 1 tablet by

## 2025-05-05 ENCOUNTER — OFFICE VISIT (OUTPATIENT)
Dept: CARDIOLOGY CLINIC | Age: 75
End: 2025-05-05
Payer: MEDICARE

## 2025-05-05 ENCOUNTER — TELEPHONE (OUTPATIENT)
Dept: CARDIOLOGY CLINIC | Age: 75
End: 2025-05-05

## 2025-05-05 VITALS
WEIGHT: 173.6 LBS | SYSTOLIC BLOOD PRESSURE: 124 MMHG | HEIGHT: 66 IN | OXYGEN SATURATION: 96 % | DIASTOLIC BLOOD PRESSURE: 72 MMHG | HEART RATE: 53 BPM | BODY MASS INDEX: 27.9 KG/M2

## 2025-05-05 DIAGNOSIS — I25.10 CORONARY ARTERY DISEASE INVOLVING NATIVE CORONARY ARTERY OF NATIVE HEART WITHOUT ANGINA PECTORIS: ICD-10-CM

## 2025-05-05 DIAGNOSIS — Z79.899 MEDICATION MANAGEMENT: ICD-10-CM

## 2025-05-05 DIAGNOSIS — I10 HYPERTENSION, UNSPECIFIED TYPE: ICD-10-CM

## 2025-05-05 DIAGNOSIS — Z95.1 S/P CABG X 5: Primary | ICD-10-CM

## 2025-05-05 DIAGNOSIS — E78.2 MIXED HYPERLIPIDEMIA: ICD-10-CM

## 2025-05-05 LAB
ALBUMIN SERPL-MCNC: 4.4 G/DL (ref 3.4–5)
ALBUMIN/GLOB SERPL: 1.8 {RATIO} (ref 1.1–2.2)
ALP SERPL-CCNC: 71 U/L (ref 40–129)
ALT SERPL-CCNC: 25 U/L (ref 10–40)
ANION GAP SERPL CALCULATED.3IONS-SCNC: 8 MMOL/L (ref 3–16)
AST SERPL-CCNC: 28 U/L (ref 15–37)
BILIRUB SERPL-MCNC: 0.6 MG/DL (ref 0–1)
BUN SERPL-MCNC: 12 MG/DL (ref 7–20)
CALCIUM SERPL-MCNC: 10.1 MG/DL (ref 8.3–10.6)
CHLORIDE SERPL-SCNC: 106 MMOL/L (ref 99–110)
CHOLEST SERPL-MCNC: 141 MG/DL (ref 0–199)
CO2 SERPL-SCNC: 28 MMOL/L (ref 21–32)
CREAT SERPL-MCNC: 0.9 MG/DL (ref 0.8–1.3)
GFR SERPLBLD CREATININE-BSD FMLA CKD-EPI: >90 ML/MIN/{1.73_M2}
GLUCOSE SERPL-MCNC: 105 MG/DL (ref 70–99)
HDLC SERPL-MCNC: 58 MG/DL (ref 40–60)
LDL CHOLESTEROL: 67 MG/DL
POTASSIUM SERPL-SCNC: 6.2 MMOL/L (ref 3.5–5.1)
PROT SERPL-MCNC: 6.9 G/DL (ref 6.4–8.2)
SODIUM SERPL-SCNC: 142 MMOL/L (ref 136–145)
TRIGL SERPL-MCNC: 79 MG/DL (ref 0–150)
VLDLC SERPL CALC-MCNC: 16 MG/DL

## 2025-05-05 PROCEDURE — G2211 COMPLEX E/M VISIT ADD ON: HCPCS | Performed by: INTERNAL MEDICINE

## 2025-05-05 PROCEDURE — 1123F ACP DISCUSS/DSCN MKR DOCD: CPT | Performed by: INTERNAL MEDICINE

## 2025-05-05 PROCEDURE — 3078F DIAST BP <80 MM HG: CPT | Performed by: INTERNAL MEDICINE

## 2025-05-05 PROCEDURE — 99214 OFFICE O/P EST MOD 30 MIN: CPT | Performed by: INTERNAL MEDICINE

## 2025-05-05 PROCEDURE — 1159F MED LIST DOCD IN RCRD: CPT | Performed by: INTERNAL MEDICINE

## 2025-05-05 PROCEDURE — 3017F COLORECTAL CA SCREEN DOC REV: CPT | Performed by: INTERNAL MEDICINE

## 2025-05-05 PROCEDURE — G8427 DOCREV CUR MEDS BY ELIG CLIN: HCPCS | Performed by: INTERNAL MEDICINE

## 2025-05-05 PROCEDURE — 1036F TOBACCO NON-USER: CPT | Performed by: INTERNAL MEDICINE

## 2025-05-05 PROCEDURE — 3074F SYST BP LT 130 MM HG: CPT | Performed by: INTERNAL MEDICINE

## 2025-05-05 PROCEDURE — G8419 CALC BMI OUT NRM PARAM NOF/U: HCPCS | Performed by: INTERNAL MEDICINE

## 2025-05-05 RX ORDER — LOSARTAN POTASSIUM 25 MG/1
25 TABLET ORAL DAILY
Qty: 90 TABLET | Refills: 3 | Status: SHIPPED | OUTPATIENT
Start: 2025-05-05

## 2025-05-05 NOTE — TELEPHONE ENCOUNTER
Joe from [facility] lab called into office with critical lab results for pt     Potassium - 6.2

## 2025-05-05 NOTE — TELEPHONE ENCOUNTER
Spoke with RNDP, she said to have pt hold losartan tonight until we hear from VSP, spoke with pt, pt v/u  Pt sts today was the first time in weeks he took Losartan    VSP please review and advise

## 2025-05-05 NOTE — PATIENT INSTRUCTIONS
Plan:  Great job on not smoking. Continue to avoid as this is risk factor for heart attack, stroke, and/or cancer.    Discontinue Brilinta ~ therapy completed  Continue aspirin 81 mg daily indefinitely ~ coronary artery disease with history of coronary stent and bypass grafting   Proceed with lipids and liver function testing as ordered  Patient given detailed instructions on addressing diet, regular exercise, weight control, smoking abstention, medication compliance, and stress minimization. The patient was provided written and verbal instructions regarding risk factor modification.    Follow up with me in 1 year

## 2025-05-06 ENCOUNTER — RESULTS FOLLOW-UP (OUTPATIENT)
Dept: CARDIOLOGY CLINIC | Age: 75
End: 2025-05-06

## 2025-05-06 DIAGNOSIS — E87.5 HYPERKALEMIA: Primary | ICD-10-CM

## 2025-05-06 NOTE — RESULT ENCOUNTER NOTE
Called and spoke to patient. He VU to instructions. Order placed, patient VU to have BMP at [facility].

## 2025-05-06 NOTE — TELEPHONE ENCOUNTER
VSP called and spoke with patient. He denies any symptoms. He reports last 6 months he has been taking losartan every other day and in past 2 weeks was not taking at all until yesterday took 1 dose in AM prior to lab work. He is avoiding high potassium foods at this time. VU to hold losartan, please advise on further recommendations and would you like a BMP in 1 week?

## 2025-05-08 DIAGNOSIS — E87.5 HYPERKALEMIA: ICD-10-CM

## 2025-05-08 LAB
ANION GAP SERPL CALCULATED.3IONS-SCNC: 9 MMOL/L (ref 3–16)
BUN SERPL-MCNC: 18 MG/DL (ref 7–20)
CALCIUM SERPL-MCNC: 9.6 MG/DL (ref 8.3–10.6)
CHLORIDE SERPL-SCNC: 103 MMOL/L (ref 99–110)
CO2 SERPL-SCNC: 26 MMOL/L (ref 21–32)
CREAT SERPL-MCNC: 0.9 MG/DL (ref 0.8–1.3)
GFR SERPLBLD CREATININE-BSD FMLA CKD-EPI: >90 ML/MIN/{1.73_M2}
GLUCOSE SERPL-MCNC: 90 MG/DL (ref 70–99)
POTASSIUM SERPL-SCNC: 4.6 MMOL/L (ref 3.5–5.1)
SODIUM SERPL-SCNC: 138 MMOL/L (ref 136–145)

## 2025-05-09 ENCOUNTER — RESULTS FOLLOW-UP (OUTPATIENT)
Dept: CARDIOLOGY CLINIC | Age: 75
End: 2025-05-09

## 2025-05-09 DIAGNOSIS — Z79.899 MEDICATION MANAGEMENT: Primary | ICD-10-CM

## 2025-05-09 NOTE — RESULT ENCOUNTER NOTE
Attempted to reach, no answer. Left VM for patient to call office to relay VSP message and instructions. BMP ordered for 2 weeks.

## 2025-05-13 ENCOUNTER — TELEPHONE (OUTPATIENT)
Dept: CARDIOLOGY CLINIC | Age: 75
End: 2025-05-13

## 2025-05-13 NOTE — TELEPHONE ENCOUNTER
Pt presented himself in the office and dropped from faa letters asking matthew tracing. Pt stated it also needs kxa option about pt needing pacemaker. Pt asked for hard copy and cd(if can be made). Please advise.     Call back to once everything is done, [phone]     Place in kxa folder in ep suite

## 2025-05-13 NOTE — TELEPHONE ENCOUNTER
Maritza, are you able to put the letter from 03/12/2025 and the monitor from 02/2025 with all tracings on a CD for the patient to ?

## 2025-05-19 NOTE — TELEPHONE ENCOUNTER
Pt calling to see if letter was created by SUZANNE. Told pt SUZANNE had made letter on 5/14/2025. Pt coming into office to . TY

## 2025-05-19 NOTE — TELEPHONE ENCOUNTER
Letter, KORIN report, and visit summaries placed in folder for pt to .    Patient notified of results. Patient confirms understanding and states she is still trying to quit smoking. Patient will repeat PAP and HPV in one year. HM updated. No other questions or concerns.

## 2025-05-20 DIAGNOSIS — Z79.899 MEDICATION MANAGEMENT: ICD-10-CM

## 2025-05-20 LAB
ANION GAP SERPL CALCULATED.3IONS-SCNC: 8 MMOL/L (ref 3–16)
BUN SERPL-MCNC: 15 MG/DL (ref 7–20)
CALCIUM SERPL-MCNC: 9.6 MG/DL (ref 8.3–10.6)
CHLORIDE SERPL-SCNC: 104 MMOL/L (ref 99–110)
CO2 SERPL-SCNC: 27 MMOL/L (ref 21–32)
CREAT SERPL-MCNC: 0.8 MG/DL (ref 0.8–1.3)
GFR SERPLBLD CREATININE-BSD FMLA CKD-EPI: >90 ML/MIN/{1.73_M2}
GLUCOSE SERPL-MCNC: 92 MG/DL (ref 70–99)
POTASSIUM SERPL-SCNC: 4.8 MMOL/L (ref 3.5–5.1)
SODIUM SERPL-SCNC: 139 MMOL/L (ref 136–145)

## 2025-05-21 ENCOUNTER — RESULTS FOLLOW-UP (OUTPATIENT)
Dept: CARDIOLOGY CLINIC | Age: 75
End: 2025-05-21

## 2025-05-21 NOTE — RESULT ENCOUNTER NOTE
I reviewed the labs and potassium is normal. Continue current medical regimen. Call patient with results and recommendations.  Basic Metabolic Panel

## 2025-05-22 ENCOUNTER — TELEPHONE (OUTPATIENT)
Dept: PHARMACY | Age: 75
End: 2025-05-22

## 2025-05-22 ENCOUNTER — TELEPHONE (OUTPATIENT)
Dept: CARDIOLOGY CLINIC | Age: 75
End: 2025-05-22

## 2025-05-22 NOTE — TELEPHONE ENCOUNTER
Specialty Medication Service    Date: 5/22/2025  Patient's Name: Arcenio Encinas YOB: 1950            _____________________________________________________________________________________________    Spoke with patient to schedule PharmD follow up appointment for Specialty Medication Services. Patient scheduled 06/17/25 at 8 am.      Juani Dumont CPhT  Pharmacy   Specialty Medication Services   Phone: [phone] option 4    For Pharmacy Admin Tracking Only    Program: [facility]  CPA in place:  Yes  Recommendation Provided To: Patient/Caregiver: 1 via Telephone  Intervention Detail: Scheduled Appointment  Intervention Accepted By: Patient/Caregiver: 1  Gap Closed?:    Time Spent (min): 15

## 2025-05-22 NOTE — TELEPHONE ENCOUNTER
Message  Received: Yesterday  Cyrus Mason MD Partin, Desirae F, RN         Attached Notes    Result Encounter Note by Cyrus Mason MD at 5/21/2025  8:59 AM    Author: Cyrus Mason MD Service: -- Author Type: Physician   Filed: 5/21/2025  8:59 AM Encounter Date: 5/21/2025 Note Type: Result Encounter Note   Status: Signed : Cyrus Mason MD (Physician)   I reviewed the labs and potassium is normal. Continue current medical regimen. Call patient with results and recommendations.  Basic Metabolic Panel      Routing History    Date/Time From To Method   5/21/2025  8:59 AM Cyrus Mason MD Partin, Desirae F, ALECIA In Basket         Attempted to reach. Left VM with VSP results. Office number provided for any additional questions.

## 2025-06-13 DIAGNOSIS — Z95.1 S/P CABG X 5: ICD-10-CM

## 2025-06-13 DIAGNOSIS — E78.2 MIXED HYPERLIPIDEMIA: ICD-10-CM

## 2025-06-13 DIAGNOSIS — I25.10 CORONARY ARTERY DISEASE INVOLVING NATIVE CORONARY ARTERY OF NATIVE HEART WITHOUT ANGINA PECTORIS: ICD-10-CM

## 2025-06-13 RX ORDER — EVOLOCUMAB 140 MG/ML
140 INJECTION, SOLUTION SUBCUTANEOUS
Qty: 2 ADJUSTABLE DOSE PRE-FILLED PEN SYRINGE | Refills: 10 | Status: SHIPPED | OUTPATIENT
Start: 2025-06-13

## 2025-06-13 NOTE — TELEPHONE ENCOUNTER
Medication Refill  Medication needing refilled:  Evolocumab   Dosage of the medication:  140 mg  How are you taking this medication (QD, BID, TID, QID, PRN):   Inject 140 mg into the skin every 14 days   30 or 90 day supply called in:  90  When will you run out of your medication:  7.5.25  Which Pharmacy are we sending the medication to?:   [facility] - [place], [place] - [address] - P [phone] - F [phone] [address] [place] [address] [place] [address]  Phone: [phone]  Fax: [phone]     Medication Question/Concern    What issues/concerns are you having with this medication: Pt states he needs a PA for the medication per his new insurance.  Pt letter states that provider needs to contact [facility] to submit info for PA.    [facility] part d  CONTACT INFO IS [phone]  Pt can be reached [phone]    Last ov 5.5.25 HANANE

## 2025-06-17 ENCOUNTER — TELEPHONE (OUTPATIENT)
Dept: CARDIOLOGY CLINIC | Age: 75
End: 2025-06-17

## 2025-06-17 ENCOUNTER — PHARMACY VISIT (OUTPATIENT)
Dept: PHARMACY | Age: 75
End: 2025-06-17

## 2025-06-17 DIAGNOSIS — E78.2 MIXED HYPERLIPIDEMIA: ICD-10-CM

## 2025-06-17 DIAGNOSIS — I25.10 CORONARY ARTERY DISEASE INVOLVING NATIVE CORONARY ARTERY OF NATIVE HEART WITHOUT ANGINA PECTORIS: Primary | ICD-10-CM

## 2025-06-17 NOTE — TELEPHONE ENCOUNTER
KAI from [facility] called for pt last lipid test and last o/v.  Ppw faxed to [phone]

## 2025-06-17 NOTE — PROGRESS NOTES
Specialty Medication Service    Patient's Name: Arcenio Encinas YOB: 1950      Reason for visit: Arcenio Encinas is a 74 y.o. male presenting today for Specialty Medication Service visit follow up. Patient last seen by [facility] 24. Patient continues on [facility] formulary medication, Repatha. Pharmacy completed Specialty Medication Service visit for medication monitoring and counseling. Medication list updated.    Specialty Medication: Repatha Sureclick 140mg/mL SOAJ   Frequency: Every 14 days   Indication: hyperlipidemia and CAD/ACS  Initially Diagnosed: , 8 years ago   Additional Therapy:   Rosuvastatin - not currently taking, has not discussed with provider (has not taken in 6 months)  Previous Therapy:   None     Specialist:   Cyrus Mason MD  [facility]  [address] [id number] [phone]  Specialist Progress Note Available: Yes, EPIC ([facility] Provider)    No Known Allergies    Past Medical History:   Diagnosis Date    CAD (coronary artery disease)     HTN (hypertension) 10/3/2023    Mixed hyperlipidemia 2018    Prostate cancer (HCC) 2019      Social History     Tobacco Use    Smoking status: Former     Current packs/day: 0.00     Average packs/day: 0.3 packs/day for 10.0 years (2.5 ttl pk-yrs)     Types: Cigarettes     Start date: 1973     Quit date: 1982     Years since quittin.4     Passive exposure: Never    Smokeless tobacco: Never   Substance Use Topics    Alcohol use: Yes     Alcohol/week: 5.0 standard drinks of alcohol     Types: 5 Glasses of wine per week     Family History   Problem Relation Age of Onset    Heart Disease Mother     Stroke Mother     Heart Disease Father     Other Sister      INTERM HISTORY  Have you been diagnosed with any additional conditions since we last talked? no  Have you developed any new allergies since we last talked? no  Have you stopped taking any medications or supplements since we last talked? no  Have you

## 2025-06-20 ENCOUNTER — TELEPHONE (OUTPATIENT)
Dept: ADMINISTRATIVE | Age: 75
End: 2025-06-20

## 2025-08-22 SDOH — HEALTH STABILITY: PHYSICAL HEALTH: ON AVERAGE, HOW MANY MINUTES DO YOU ENGAGE IN EXERCISE AT THIS LEVEL?: 30 MIN

## 2025-08-22 SDOH — ECONOMIC STABILITY: INCOME INSECURITY: IN THE LAST 12 MONTHS, WAS THERE A TIME WHEN YOU WERE NOT ABLE TO PAY THE MORTGAGE OR RENT ON TIME?: NO

## 2025-08-22 SDOH — ECONOMIC STABILITY: FOOD INSECURITY: WITHIN THE PAST 12 MONTHS, THE FOOD YOU BOUGHT JUST DIDN'T LAST AND YOU DIDN'T HAVE MONEY TO GET MORE.: NEVER TRUE

## 2025-08-22 SDOH — HEALTH STABILITY: PHYSICAL HEALTH: ON AVERAGE, HOW MANY DAYS PER WEEK DO YOU ENGAGE IN MODERATE TO STRENUOUS EXERCISE (LIKE A BRISK WALK)?: 3 DAYS

## 2025-08-22 SDOH — ECONOMIC STABILITY: FOOD INSECURITY: WITHIN THE PAST 12 MONTHS, YOU WORRIED THAT YOUR FOOD WOULD RUN OUT BEFORE YOU GOT MONEY TO BUY MORE.: NEVER TRUE

## 2025-08-22 ASSESSMENT — PATIENT HEALTH QUESTIONNAIRE - PHQ9
SUM OF ALL RESPONSES TO PHQ QUESTIONS 1-9: 0
SUM OF ALL RESPONSES TO PHQ QUESTIONS 1-9: 0
2. FEELING DOWN, DEPRESSED OR HOPELESS: NOT AT ALL
1. LITTLE INTEREST OR PLEASURE IN DOING THINGS: NOT AT ALL
SUM OF ALL RESPONSES TO PHQ QUESTIONS 1-9: 0
SUM OF ALL RESPONSES TO PHQ QUESTIONS 1-9: 0

## 2025-08-22 ASSESSMENT — LIFESTYLE VARIABLES
HOW OFTEN DO YOU HAVE A DRINK CONTAINING ALCOHOL: 4
HOW MANY STANDARD DRINKS CONTAINING ALCOHOL DO YOU HAVE ON A TYPICAL DAY: 1
HOW OFTEN DO YOU HAVE A DRINK CONTAINING ALCOHOL: 2-3 TIMES A WEEK
HOW OFTEN DO YOU HAVE SIX OR MORE DRINKS ON ONE OCCASION: 1
HOW MANY STANDARD DRINKS CONTAINING ALCOHOL DO YOU HAVE ON A TYPICAL DAY: 1 OR 2

## 2025-08-28 ENCOUNTER — OFFICE VISIT (OUTPATIENT)
Dept: FAMILY MEDICINE CLINIC | Age: 75
End: 2025-08-28
Payer: MEDICARE

## 2025-08-28 VITALS
WEIGHT: 172.8 LBS | HEIGHT: 66 IN | HEART RATE: 50 BPM | SYSTOLIC BLOOD PRESSURE: 137 MMHG | RESPIRATION RATE: 16 BRPM | TEMPERATURE: 97.5 F | BODY MASS INDEX: 27.77 KG/M2 | DIASTOLIC BLOOD PRESSURE: 79 MMHG | OXYGEN SATURATION: 99 %

## 2025-08-28 DIAGNOSIS — E87.5 SERUM POTASSIUM ELEVATED: ICD-10-CM

## 2025-08-28 DIAGNOSIS — C61 PROSTATE CANCER (HCC): ICD-10-CM

## 2025-08-28 DIAGNOSIS — I10 HYPERTENSION, UNSPECIFIED TYPE: ICD-10-CM

## 2025-08-28 DIAGNOSIS — I49.5 SINUS NODE DYSFUNCTION (HCC): ICD-10-CM

## 2025-08-28 DIAGNOSIS — Z00.00 INITIAL MEDICARE ANNUAL WELLNESS VISIT: Primary | ICD-10-CM

## 2025-08-28 LAB
ANION GAP SERPL CALCULATED.3IONS-SCNC: 9 MMOL/L (ref 3–16)
BASOPHILS # BLD: 0.1 K/UL (ref 0–0.2)
BASOPHILS NFR BLD: 0.9 %
BUN SERPL-MCNC: 15 MG/DL (ref 7–20)
CALCIUM SERPL-MCNC: 10 MG/DL (ref 8.3–10.6)
CHLORIDE SERPL-SCNC: 100 MMOL/L (ref 99–110)
CO2 SERPL-SCNC: 25 MMOL/L (ref 21–32)
CREAT SERPL-MCNC: 0.9 MG/DL (ref 0.8–1.3)
DEPRECATED RDW RBC AUTO: 13.7 % (ref 12.4–15.4)
EOSINOPHIL # BLD: 0.1 K/UL (ref 0–0.6)
EOSINOPHIL NFR BLD: 1.1 %
GFR SERPLBLD CREATININE-BSD FMLA CKD-EPI: >90 ML/MIN/{1.73_M2}
GLUCOSE SERPL-MCNC: 94 MG/DL (ref 70–99)
HCT VFR BLD AUTO: 44.3 % (ref 40.5–52.5)
HGB BLD-MCNC: 15.3 G/DL (ref 13.5–17.5)
LYMPHOCYTES # BLD: 1.3 K/UL (ref 1–5.1)
LYMPHOCYTES NFR BLD: 21.9 %
MCH RBC QN AUTO: 29.1 PG (ref 26–34)
MCHC RBC AUTO-ENTMCNC: 34.4 G/DL (ref 31–36)
MCV RBC AUTO: 84.7 FL (ref 80–100)
MONOCYTES # BLD: 0.5 K/UL (ref 0–1.3)
MONOCYTES NFR BLD: 8.6 %
NEUTROPHILS # BLD: 3.9 K/UL (ref 1.7–7.7)
NEUTROPHILS NFR BLD: 67.5 %
PLATELET # BLD AUTO: 215 K/UL (ref 135–450)
PMV BLD AUTO: 9.1 FL (ref 5–10.5)
POTASSIUM SERPL-SCNC: 4.9 MMOL/L (ref 3.5–5.1)
PSA SERPL DL<=0.01 NG/ML-MCNC: <0.02 NG/ML (ref 0–4)
RBC # BLD AUTO: 5.24 M/UL (ref 4.2–5.9)
SODIUM SERPL-SCNC: 134 MMOL/L (ref 136–145)
TSH SERPL DL<=0.005 MIU/L-ACNC: 1.24 UIU/ML (ref 0.27–4.2)
WBC # BLD AUTO: 5.8 K/UL (ref 4–11)

## 2025-08-28 PROCEDURE — G8419 CALC BMI OUT NRM PARAM NOF/U: HCPCS | Performed by: FAMILY MEDICINE

## 2025-08-28 PROCEDURE — 1160F RVW MEDS BY RX/DR IN RCRD: CPT | Performed by: FAMILY MEDICINE

## 2025-08-28 PROCEDURE — G0438 PPPS, INITIAL VISIT: HCPCS | Performed by: FAMILY MEDICINE

## 2025-08-28 PROCEDURE — 3078F DIAST BP <80 MM HG: CPT | Performed by: FAMILY MEDICINE

## 2025-08-28 PROCEDURE — 1159F MED LIST DOCD IN RCRD: CPT | Performed by: FAMILY MEDICINE

## 2025-08-28 PROCEDURE — G8427 DOCREV CUR MEDS BY ELIG CLIN: HCPCS | Performed by: FAMILY MEDICINE

## 2025-08-28 PROCEDURE — 3075F SYST BP GE 130 - 139MM HG: CPT | Performed by: FAMILY MEDICINE

## 2025-08-28 PROCEDURE — G2211 COMPLEX E/M VISIT ADD ON: HCPCS | Performed by: FAMILY MEDICINE

## 2025-08-28 PROCEDURE — 1036F TOBACCO NON-USER: CPT | Performed by: FAMILY MEDICINE

## 2025-08-28 PROCEDURE — 1123F ACP DISCUSS/DSCN MKR DOCD: CPT | Performed by: FAMILY MEDICINE

## 2025-08-28 PROCEDURE — 99213 OFFICE O/P EST LOW 20 MIN: CPT | Performed by: FAMILY MEDICINE

## 2025-08-28 PROCEDURE — 3017F COLORECTAL CA SCREEN DOC REV: CPT | Performed by: FAMILY MEDICINE

## 2025-08-29 DIAGNOSIS — I10 HYPERTENSION, UNSPECIFIED TYPE: ICD-10-CM

## 2025-08-29 DIAGNOSIS — I25.10 CORONARY ARTERY DISEASE INVOLVING NATIVE CORONARY ARTERY OF NATIVE HEART WITHOUT ANGINA PECTORIS: ICD-10-CM

## 2025-08-29 RX ORDER — LOSARTAN POTASSIUM 25 MG/1
25 TABLET ORAL DAILY
Qty: 90 TABLET | Refills: 2 | Status: SHIPPED | OUTPATIENT
Start: 2025-08-29

## (undated) DEVICE — BW-412T DISP COMBO CLEANING BRUSH: Brand: SINGLE USE COMBINATION CLEANING BRUSH

## (undated) DEVICE — SYRINGE, LUER LOCK, 10ML: Brand: MEDLINE

## (undated) DEVICE — KIT OR ROOM TURNOVER W/STRAP

## (undated) DEVICE — INSUFFLATION NEEDLE TO ESTABLISH PNEUMOPERITONEUM.: Brand: INSUFFLATION NEEDLE

## (undated) DEVICE — SET VLV 3 PC AWS DISPOSABLE GRDIAN SCOPEVALET

## (undated) DEVICE — ELECTRODE PT RET AD L9FT HI MOIST COND ADH HYDRGEL CORDED

## (undated) DEVICE — DEVICE SECUREMENT AD W/ TRICOT ANCHR PD FOR F LTX SIL CATH

## (undated) DEVICE — CANNULA SEAL

## (undated) DEVICE — BLADE CLIPPER GEN PURP NS

## (undated) DEVICE — TRAY PREP DRY W/ PREM GLV 2 APPL 6 SPNG 2 UNDPD 1 OVERWRAP

## (undated) DEVICE — SUTURE ETHBND EXCEL SZ 0 L18IN NONABSORBABLE GRN L26MM CT-2 CX27D

## (undated) DEVICE — 3M™ STERI-DRAPE™ INCISE DRAPE 1050 (60CM X 45CM): Brand: STERI-DRAPE™

## (undated) DEVICE — AIRSEAL 8 MM ACCESS PORT AND LOW PROFILE OBTURATOR WITH BLADELESS OPTICAL TIP, 120 MM LENGTH: Brand: AIRSEAL

## (undated) DEVICE — SKIN AFFIX SURG ADHESIVE 72/CS 0.55ML: Brand: MEDLINE

## (undated) DEVICE — SUTURE VCRL SZ 3-0 L18IN ABSRB UD L26MM SH 1/2 CIR J864D

## (undated) DEVICE — LIGHT HANDLE: Brand: DEVON

## (undated) DEVICE — STERILE LATEX POWDER FREE SURGICAL GLOVES WITH HYDROGEL COATING: Brand: PROTEXIS

## (undated) DEVICE — TROCAR LAP L100MM DIA5MM BLDELSS W/ STBL SL ENDOPATH XCEL

## (undated) DEVICE — TIP COVER ACCESSORY

## (undated) DEVICE — SOLUTION IV IRRIG WATER 1000ML POUR BRL 2F7114

## (undated) DEVICE — SOLUTION IV IRRIG WATER 500ML POUR BRL ST 2F7113

## (undated) DEVICE — PENCIL ES L3M BTTN SWCH S STL HEX LOK BLDE ELECTRD HOLSTER

## (undated) DEVICE — SNARES COLD OVAL 10MM THIN

## (undated) DEVICE — ARM DRAPE

## (undated) DEVICE — Z INACTIVE USE 2641839 CLIP INT M L POLYMER LOK LIG HEM O LOK

## (undated) DEVICE — CHLORAPREP 26ML ORANGE

## (undated) DEVICE — LARGE NEEDLE DRIVER: Brand: ENDOWRIST

## (undated) DEVICE — ROBOTIC PK

## (undated) DEVICE — PROTECTOR EYE PT SELF ADH NS OPT GRD LF

## (undated) DEVICE — PROCEDURE KIT ENDOSCP CUST

## (undated) DEVICE — CATHETER,FOLEY,SILI-ELAST,LTX,18FR,10ML: Brand: MEDLINE

## (undated) DEVICE — SUTURE V-LOC 90 3-0 L6IN ABSRB UD L26MM V-20 1/2 CIR TAPR VLOCM2004

## (undated) DEVICE — BLADELESS OBTURATOR: Brand: WECK VISTA

## (undated) DEVICE — COLUMN DRAPE

## (undated) DEVICE — INVIEW CLEAR LEGGINGS: Brand: CONVERTORS

## (undated) DEVICE — CATHETER TRAY 16 FR 5 CC FOL ANTIREFLX SAMPLING PRT DOVER

## (undated) DEVICE — SUTURE V-LOC 90 3-0 L6IN ABSRB UD CV-23 L17MM 1/2 CIR VLOCM1904

## (undated) DEVICE — SOLUTION ANTIFOG VIS SYS CLEARIFY LAPSCP

## (undated) DEVICE — TRI-LUMEN FILTERED TUBE SET WITH ACTIVATED CHARCOAL FILTER: Brand: AIRSEAL

## (undated) DEVICE — COVER EQUIP STEEP TREND EZ CLN UP WTRPRF DISP FOR STD SZ

## (undated) DEVICE — SUTURE VCRL SZ 3-0 L27IN ABSRB UD L26MM SH 1/2 CIR J416H

## (undated) DEVICE — SUTURE MCRYL SZ 4-0 L27IN ABSRB UD L19MM PS-2 1/2 CIR PRIM Y426H

## (undated) DEVICE — ANCHOR TISSUE RETRIEVAL SYSTEM, BAG SIZE 125 ML, PORT SIZE 8 MM: Brand: ANCHOR TISSUE RETRIEVAL SYSTEM

## (undated) DEVICE — TOTAL TRAY, DB, 100% SILI FOLEY, 16FR 10: Brand: MEDLINE

## (undated) DEVICE — SUTURE VLOC 90 2/0 VL 6 GS-22 VLOCM2105

## (undated) DEVICE — TRAP SPEC RETRV CLR PLAS POLYP IN LN SUCT QUIK CTCH